# Patient Record
Sex: MALE | Race: WHITE | Employment: OTHER | RURAL
[De-identification: names, ages, dates, MRNs, and addresses within clinical notes are randomized per-mention and may not be internally consistent; named-entity substitution may affect disease eponyms.]

---

## 2019-04-17 PROBLEM — K21.9 GERD (GASTROESOPHAGEAL REFLUX DISEASE): Status: ACTIVE | Noted: 2019-04-17

## 2019-04-17 PROBLEM — I10 HTN (HYPERTENSION): Status: ACTIVE | Noted: 2019-04-17

## 2019-04-17 PROBLEM — M19.90 OA (OSTEOARTHRITIS): Status: ACTIVE | Noted: 2019-04-17

## 2019-04-17 PROBLEM — C61 PROSTATE CA (HCC): Status: ACTIVE | Noted: 2019-04-17

## 2019-04-17 PROBLEM — K92.2 GI BLEED: Status: ACTIVE | Noted: 2019-04-17

## 2019-05-09 ENCOUNTER — TELEPHONE (OUTPATIENT)
Dept: CARDIOLOGY CLINIC | Age: 84
End: 2019-05-09

## 2019-05-09 NOTE — TELEPHONE ENCOUNTER
Spoke with the pt. Verified patient with two patient identifiers. Pt made an appt with Dr. Jaja Womack for June but he will run out of the metoprolol by then. Pt states that Dr. Jaja Womack consulted on him at the hospital and recommended the metoprolol. Pt would like Dr. Jaja Womack advice on the metoprolol.

## 2019-05-10 RX ORDER — METOPROLOL SUCCINATE 25 MG/1
25 TABLET, EXTENDED RELEASE ORAL DAILY
Qty: 30 TAB | Refills: 1 | Status: SHIPPED | OUTPATIENT
Start: 2019-05-10 | End: 2019-06-13 | Stop reason: SDUPTHER

## 2019-05-10 NOTE — TELEPHONE ENCOUNTER
Per Dr. Katy Eric:  Spoke to him briefly in hospital and talkedd to Dr. Daryle [place] (not formal consult)--agree with the Metoprolol XL 25mg --can send in rx w/ refills. [facility]     Verbal order per Dr. Katy Eric. Order (medication, dose, route, frequency, amount, refills) repeated and verified twice. L/m for the pt regarding.

## 2019-05-28 ENCOUNTER — TELEPHONE (OUTPATIENT)
Dept: CARDIOLOGY CLINIC | Age: 84
End: 2019-05-28

## 2019-05-28 NOTE — TELEPHONE ENCOUNTER
Was in [facility] a few weeks ago and was put on Metoprolo and is on his last few pills. Does he need to continue taking this? Please call [phone].     Thanks,    Rodrigo Kearns

## 2019-05-28 NOTE — TELEPHONE ENCOUNTER
Per Dr. Chidi Deluca:  Spoke to him briefly in hospital and talkedd to Dr. Isaac Fierro (not formal consult)--agree with the Metoprolol XL 25mg --can send in rx w/ refills. [facility]     Spoke with the patient. Verified patient with two patient identifiers. Dr. Chidi Deluca order given and questions answered. Patient verbalized understanding.

## 2019-06-13 ENCOUNTER — OFFICE VISIT (OUTPATIENT)
Dept: CARDIOLOGY CLINIC | Age: 84
End: 2019-06-13

## 2019-06-13 VITALS
OXYGEN SATURATION: 97 % | DIASTOLIC BLOOD PRESSURE: 80 MMHG | HEART RATE: 68 BPM | HEIGHT: 66 IN | SYSTOLIC BLOOD PRESSURE: 114 MMHG | WEIGHT: 174 LBS | BODY MASS INDEX: 27.97 KG/M2 | RESPIRATION RATE: 14 BRPM

## 2019-06-13 DIAGNOSIS — K21.9 GASTROESOPHAGEAL REFLUX DISEASE WITHOUT ESOPHAGITIS: ICD-10-CM

## 2019-06-13 DIAGNOSIS — M15.9 PRIMARY OSTEOARTHRITIS INVOLVING MULTIPLE JOINTS: ICD-10-CM

## 2019-06-13 DIAGNOSIS — K92.2 GASTROINTESTINAL HEMORRHAGE, UNSPECIFIED GASTROINTESTINAL HEMORRHAGE TYPE: ICD-10-CM

## 2019-06-13 DIAGNOSIS — C61 PROSTATE CA (HCC): ICD-10-CM

## 2019-06-13 DIAGNOSIS — I10 ESSENTIAL HYPERTENSION: ICD-10-CM

## 2019-06-13 DIAGNOSIS — I49.9 IRREGULAR HEART BEAT: ICD-10-CM

## 2019-06-13 DIAGNOSIS — I47.1 PAT (PAROXYSMAL ATRIAL TACHYCARDIA) (HCC): Primary | ICD-10-CM

## 2019-06-13 RX ORDER — DOXAZOSIN 4 MG/1
4 TABLET ORAL
Refills: 3 | COMMUNITY
Start: 2019-05-13 | End: 2019-07-16

## 2019-06-13 RX ORDER — METOPROLOL SUCCINATE 25 MG/1
25 TABLET, EXTENDED RELEASE ORAL DAILY
Qty: 90 TAB | Refills: 1 | Status: SHIPPED | OUTPATIENT
Start: 2019-06-13 | End: 2019-12-18 | Stop reason: SINTOL

## 2019-06-13 NOTE — PROGRESS NOTES
Marleni Leonard is a 80 y.o. male is here for new patient evaluation, PAT. Hx hypertension, DJD, GERD admitted to [facility] 4/17-4/19/19 with acute GI bleed. Had been on NSAIDS for DJD, hx GERD. No clear source identified--likely diverticular (noted on CT). Stabilized, transfused. No recurrence since. Has colonoscopy scheduled. No CV complaints but had brief episode of PAT while in hospital--really no sx associated. Started low dose beta blocker (no anticoag obviously), here for f/u. Echo 2/18 with mild LVH, LVEF 41-26, grade I diastolic. Hx hypertension but no prior cardiac hx. Currently feels well. The patient denies chest pain/ shortness of breath, orthopnea, PND, LE edema, palpitations, syncope, presyncope or fatigue. Patient Active Problem List    Diagnosis Date Noted    [facility]ox[facility]) ([facility]     Hypertension     GI bleed 04/17/2019    OA (osteoarthritis) 04/17/2019    GERD (gastroesophageal reflux disease) 04/17/2019    HTN (hypertension) 04/17/2019    Prostate CA ([facility] 04/17/2019      Orin Beth MD  Past Medical History:   Diagnosis Date    Arthritis     GERD (gastroesophageal reflux disease)     GI bleed     History of prostate surgery     Hypertension     PAT (paroxysmal atrial tachycardia) (HCC)     Prostate enlargement       Past Surgical History:   Procedure Laterality Date    HX ORTHOPAEDIC      multiple shoulder procedures    HX PROSTATECTOMY      HX UROLOGICAL       No Known Allergies   No family history on file.    Social History     Socioeconomic History    Marital status:      Spouse name: Not on file    Number of children: Not on file    Years of education: Not on file    Highest education level: Not on file   Occupational History    Not on file   Social Needs    Financial resource strain: Not on file    Food insecurity:     Worry: Not on file     Inability: Not on file    Transportation needs:     Medical: Not on file Non-medical: Not on file   Tobacco Use    Smoking status: Former Smoker    Smokeless tobacco: Never Used    Tobacco comment: quit 30 years ago   Substance and Sexual Activity    Alcohol use: Yes     Alcohol/week: 8.4 oz     Types: 14 Shots of liquor per week     Comment: Per week     Drug use: No    Sexual activity: Not on file   Lifestyle    Physical activity:     Days per week: Not on file     Minutes per session: Not on file    Stress: Not on file   Relationships    Social connections:     Talks on phone: Not on file     Gets together: Not on file     Attends Rastafarian service: Not on file     Active member of club or organization: Not on file     Attends meetings of clubs or organizations: Not on file     Relationship status: Not on file    Intimate partner violence:     Fear of current or ex partner: Not on file     Emotionally abused: Not on file     Physically abused: Not on file     Forced sexual activity: Not on file   Other Topics Concern     Service Not Asked    Blood Transfusions Not Asked    Caffeine Concern Not Asked    Occupational Exposure Not Asked   Candance Reyes Hazards Not Asked    Sleep Concern Not Asked    Stress Concern Not Asked    Weight Concern Not Asked    Special Diet Not Asked    Back Care Not Asked    Exercise Not Asked    Bike Helmet Not Asked    Seat Belt Not Asked    Self-Exams Not Asked   Social History Narrative    Not on file      Current Outpatient Medications   Medication Sig    metoprolol succinate (TOPROL-XL) 25 mg XL tablet Take 1 Tab by mouth daily.  tamsulosin (FLOMAX) 0.4 mg capsule Take 0.8 mg by mouth nightly.  vit C/E/Zn/coppr/lutein/zeaxan (PRESERVISION AREDS-2 PO) Take 1 Tab by mouth daily.  lovastatin (MEVACOR) 20 mg tablet Take 20 mg by mouth nightly.  pantoprazole (PROTONIX) 40 mg tablet Take 40 mg by mouth daily. No current facility-administered medications for this visit.           Review of Symptoms:    CONST  No weight change. No fever, chills, sweats    ENT No visual changes, URI sx, sore throat    CV  See HPI   RESP  No cough, or sputum, wheezing. Also see HPI   GI  No abdominal pain or change in bowel habits. No heartburn or dysphagia. No melena or rectal bleeding.   No dysuria, urgency, frequency, hematuria   MSKEL  No joint pain, swelling. No muscle pain. SKIN  No rash or lesions. NEURO  No headache, syncope, or seizure. No weakness, loss of sensation, or paresthesias. PSYCH  No low mood or depression  No anxiety. HE/LYMPH  No easy bruising, abnormal bleeding, or enlarged glands.         Physical ExamPhysical Exam:    Visit Vitals  Resp 14   Ht 5' 6\" (1.676 m)   Wt 174 lb (78.9 kg)   BMI 28.08 kg/m²     Gen: NAD  HEENT:  PERRL, throat clear  Neck: no adenopathy, no thyromegaly, no JVD   Heart:  Regular,Nl S1S2,  no murmur, gallop or rub.   Lungs:  clear  Abdomen:   Soft, non-tender, bowel sounds are active.   Extremities:  No edema  Pulse: symmetric  Neuro: A&O times 3, No focal neuro deficits    Cardiographics    ECG: NSR, first degree AV block, PAC's    Labs:   Lab Results   Component Value Date/Time    Sodium 143 04/19/2019 05:50 AM    Sodium 142 04/18/2019 06:07 AM    Sodium 142 04/17/2019 11:24 AM    Sodium 143 10/25/2017 01:06 PM    Potassium 4.0 04/19/2019 05:50 AM    Potassium 4.3 04/18/2019 06:07 AM    Potassium 3.8 04/17/2019 11:24 AM    Potassium 3.9 10/25/2017 01:06 PM    Chloride 108 04/19/2019 05:50 AM    Chloride 107 04/18/2019 06:07 AM    Chloride 105 04/17/2019 11:24 AM    Chloride 107 10/25/2017 01:06 PM    CO2 26 04/19/2019 05:50 AM    CO2 27 04/18/2019 06:07 AM    CO2 26 04/17/2019 11:24 AM    CO2 26 10/25/2017 01:06 PM    Anion gap 9 04/19/2019 05:50 AM    Anion gap 8 04/18/2019 06:07 AM    Anion gap 11 04/17/2019 11:24 AM    Anion gap 10 10/25/2017 01:06 PM    Glucose 126 (H) 04/19/2019 05:50 AM    Glucose 127 (H) 04/18/2019 06:07 AM    Glucose 126 (H) 04/17/2019 11:24 AM Glucose 108 (H) 10/25/2017 01:06 PM    BUN 10 04/19/2019 05:50 AM    BUN 13 04/18/2019 06:07 AM    BUN 19 04/17/2019 11:24 AM    BUN 15 10/25/2017 01:06 PM    Creatinine 0.78 04/19/2019 05:50 AM    Creatinine 0.76 04/18/2019 06:07 AM    Creatinine 0.90 04/17/2019 11:24 AM    Creatinine 0.92 10/25/2017 01:06 PM    BUN/Creatinine ratio 13 04/19/2019 05:50 AM    BUN/Creatinine ratio 17 04/18/2019 06:07 AM    BUN/Creatinine ratio 21 (H) 04/17/2019 11:24 AM    BUN/Creatinine ratio 16 10/25/2017 01:06 PM    GFR est AA >60 04/19/2019 05:50 AM    GFR est AA >60 04/18/2019 06:07 AM    GFR est AA >60 04/17/2019 11:24 AM    GFR est AA >60 10/25/2017 01:06 PM    GFR est non-AA >60 04/19/2019 05:50 AM    GFR est non-AA >60 04/18/2019 06:07 AM    GFR est non-AA >60 04/17/2019 11:24 AM    GFR est non-AA >60 10/25/2017 01:06 PM    Calcium 8.1 (L) 04/19/2019 05:50 AM    Calcium 7.7 (L) 04/18/2019 06:07 AM    Calcium 8.2 (L) 04/17/2019 11:24 AM    Calcium 8.9 10/25/2017 01:06 PM    Bilirubin, total 0.6 04/17/2019 11:24 AM    AST (SGOT) 19 04/17/2019 11:24 AM    Alk. phosphatase 67 04/17/2019 11:24 AM    Protein, total 7.1 04/17/2019 11:24 AM    Albumin 3.4 (L) 04/17/2019 11:24 AM    Globulin 3.7 04/17/2019 11:24 AM    A-G Ratio 0.9 (L) 04/17/2019 11:24 AM    ALT (SGPT) 24 04/17/2019 11:24 AM     No results found for: CPK, CPKX, CPX  No results found for: CHOL, CHOLX, CHLST, CHOLV, 225866, HDL, LDL, LDLC, DLDLP, TGLX, TRIGL, TRIGP, CHHD, CHHDX  No results found for this or any previous visit. Assessment:         Patient Active Problem List    Diagnosis Date Noted    [facility]ox[facility]mal [facility]) ([facility]     Hypertension     GI bleed 04/17/2019    OA (osteoarthritis) 04/17/2019    GERD (gastroesophageal reflux disease) 04/17/2019    HTN (hypertension) 04/17/2019    Prostate CA ([facility] 04/17/2019     Here for new patient evaluation, PAT. Hx hypertension, DJD, GERD admitted to [facility] 4/17-4/19/19 with acute GI bleed.   Had been on NSAIDS for DJD, hx GERD. No clear source identified--likely diverticular (noted on CT). Stabilized, transfused. No recurrence since. Has colonoscopy scheduled. No CV complaints but had brief episode of PAT while in hospital--really no sx associated. Started low dose beta blocker (no anticoag obviously), here for f/u. Echo 2/18 with mild LVH, LVEF 50-96, grade I diastolic. Hx hypertension but no prior cardiac hx. Currently feels well.       Plan:     Continue low dose Metoprolol XL 25  Echo/doppler (last was 2/18)--PAT (vs PAF), hypertension  Holter monitor--PAC's, PAT, r/o PAF  No anticoag  CLEAR for planned colonoscopy  Call w/ results  F/u 6 mos, sooner prn  Tasha Triana MD

## 2019-06-13 NOTE — PROGRESS NOTES
Verified patient with two patient identifiers. Medications reviewed/approved by Dr. Yaritza Tracey. A verbal from Dr. Yaritza Tracey was given to remove any medications that were deleted during the visit. Medication(s) removed:  tamsulosin (FLOMAX) 0.4 mg capsule           Chief Complaint   Patient presents with    Rapid Heart Rate     (PAT vs  SVT  vs FLUTTER) Post [facility] discharge (Dr. Yaritza Tracey consulted with hospitalist during hospitalization)     1. Have you been to the ER, urgent care clinic since your last visit? Hospitalized since your last visit? new pt.  2. Have you seen or consulted any other health care providers outside of the [address] [place] [address] since your last visit? Include any pap smears or colon screening. new pt.

## 2019-06-25 ENCOUNTER — OFFICE VISIT (OUTPATIENT)
Dept: SURGERY | Age: 84
End: 2019-06-25

## 2019-06-25 VITALS
SYSTOLIC BLOOD PRESSURE: 149 MMHG | WEIGHT: 177.8 LBS | HEIGHT: 66 IN | DIASTOLIC BLOOD PRESSURE: 73 MMHG | BODY MASS INDEX: 28.57 KG/M2 | HEART RATE: 67 BPM

## 2019-06-25 DIAGNOSIS — K21.9 GASTROESOPHAGEAL REFLUX DISEASE WITHOUT ESOPHAGITIS: ICD-10-CM

## 2019-06-25 DIAGNOSIS — K92.2 GASTROINTESTINAL HEMORRHAGE, UNSPECIFIED GASTROINTESTINAL HEMORRHAGE TYPE: Primary | ICD-10-CM

## 2019-06-25 RX ORDER — SODIUM CHLORIDE, SODIUM LACTATE, POTASSIUM CHLORIDE, CALCIUM CHLORIDE 600; 310; 30; 20 MG/100ML; MG/100ML; MG/100ML; MG/100ML
200 INJECTION, SOLUTION INTRAVENOUS CONTINUOUS
Status: CANCELLED | OUTPATIENT
Start: 2019-06-25 | End: 2019-06-26

## 2019-06-26 NOTE — PROGRESS NOTES
[address] [facility], [address]  Phone: [phone] Fax: [phone]                                           HISTORY AND PHYSICAL EXAM    NAME: Kait Santos  : 1932    Chief Complaint:   Recent GI bleed  GERD    History: Kait Santos is a 80 y.o. WHITE OR  male referred for colonoscopy and EGD. This is  the patient's second colonoscopy. The previous one was a long time ago. The bowel movements are regular and brown now. He does not use any meds on a regular basis for his bowels. He denies any recent blood in stools since his hospital stay for lower GI bleed. Family history is negative for colon cancer or colon polyps. He has had a history of ulcers in the distant past and now has some reflux symptoms. He takes omeprazole daily. Past Medical History:   Diagnosis Date    Arthritis     Dyslipidemia     GERD (gastroesophageal reflux disease)     GI bleed     History of prostate surgery     Hypertension     PAT (paroxysmal atrial tachycardia) (HCC)     Prostate enlargement      Past Surgical History:   Procedure Laterality Date    HX COLONOSCOPY      HX ORTHOPAEDIC      multiple shoulder procedures    HX PROSTATECTOMY      HX UROLOGICAL          Current Outpatient Medications   Medication Sig Dispense Refill    doxazosin (CARDURA) 4 mg tablet Take 4 mg by mouth nightly. 3    metoprolol succinate (TOPROL-XL) 25 mg XL tablet Take 1 Tab by mouth daily. 90 Tab 1    vit C/E/Zn/coppr/lutein/zeaxan (PRESERVISION AREDS-2 PO) Take 1 Tab by mouth daily.  lovastatin (MEVACOR) 20 mg tablet Take 20 mg by mouth nightly.  pantoprazole (PROTONIX) 40 mg tablet Take 40 mg by mouth daily.        No Known Allergies  Social History     Socioeconomic History    Marital status:      Spouse name: Not on file    Number of children: Not on file    Years of education: Not on file    Highest education level: Not on file   Occupational History    Not on file   Social Needs    Financial resource strain: Not on file    Food insecurity:     Worry: Not on file     Inability: Not on file    Transportation needs:     Medical: Not on file     Non-medical: Not on file   Tobacco Use    Smoking status: Former Smoker    Smokeless tobacco: Never Used    Tobacco comment: quit 30 years ago   Substance and Sexual Activity    Alcohol use:  Yes     Alcohol/week: 8.4 oz     Types: 14 Shots of liquor per week     Comment: Per week     Drug use: No    Sexual activity: Not on file   Lifestyle    Physical activity:     Days per week: Not on file     Minutes per session: Not on file    Stress: Not on file   Relationships    Social connections:     Talks on phone: Not on file     Gets together: Not on file     Attends Orthodoxy service: Not on file     Active member of club or organization: Not on file     Attends meetings of clubs or organizations: Not on file     Relationship status: Not on file    Intimate partner violence:     Fear of current or ex partner: Not on file     Emotionally abused: Not on file     Physically abused: Not on file     Forced sexual activity: Not on file   Other Topics Concern     Service Not Asked    Blood Transfusions Not Asked    Caffeine Concern Not Asked    Occupational Exposure Not Asked   Mike Alonzo Hazards Not Asked    Sleep Concern Not Asked    Stress Concern Not Asked    Weight Concern Not Asked    Special Diet Not Asked    Back Care Not Asked    Exercise Not Asked    Bike Helmet Not Asked   [address] Not Asked    Self-Exams Not Asked   Social History Narrative    Not on file     Family History   Problem Relation Age of Onset    No Known Problems Mother        Patient Active Problem List   Diagnosis Code    GI bleed K92.2    OA (osteoarthritis) M19.90    GERD (gastroesophageal reflux disease) K21.9    HTN (hypertension) I10    Prostate CA ([facility] C61    [facility] I47.1    Hypertension I10    Dyslipidemia E78.5       Review of Systems:    Review of Systems   Constitutional: Negative for chills, fever, malaise/fatigue and weight loss. HENT: Negative for congestion, ear discharge, ear pain, hearing loss, nosebleeds, sore throat and tinnitus. Eyes: Negative for blurred vision, double vision, pain, discharge and redness. Respiratory: Negative for cough, sputum production, shortness of breath and wheezing. Cardiovascular: Positive for leg swelling. Negative for chest pain and palpitations. Gastrointestinal: Positive for heartburn. Negative for abdominal pain, blood in stool, constipation, diarrhea, melena, nausea and vomiting. Genitourinary: Positive for frequency. Negative for hematuria and urgency. Musculoskeletal: Positive for joint pain. Negative for back pain and neck pain. Skin: Negative for itching and rash. Neurological: Negative for dizziness, tremors, focal weakness, seizures, weakness and headaches. Endo/Heme/Allergies: Does not bruise/bleed easily. Psychiatric/Behavioral: Negative for depression and memory loss. The patient is not nervous/anxious and does not have insomnia. Physical Exam:  Visit Vitals  /73 (BP 1 Location: Left arm, BP Patient Position: Sitting)   Pulse 67   Ht 5' 6\" (1.676 m)   Wt 177 lb 12.8 oz (80.6 kg)   BMI 28.70 kg/m²       Physical Exam   Constitutional: He is oriented to person, place, and time. He appears well-developed and well-nourished. No distress. HENT:   Head: Normocephalic and atraumatic. Right Ear: External ear normal.   Left Ear: External ear normal.   Nose: Nose normal.   Mouth/Throat: Oropharynx is clear and moist. No oropharyngeal exudate. Eyes: Pupils are equal, round, and reactive to light. EOM are normal. Right eye exhibits no discharge. Left eye exhibits no discharge. No scleral icterus. Neck: Neck supple. No tracheal deviation present. No thyromegaly present.    Cardiovascular: Normal rate, regular rhythm and normal heart sounds. Exam reveals no friction rub. No murmur heard. Pulmonary/Chest: Effort normal and breath sounds normal. No respiratory distress. He has no wheezes. He has no rales. Abdominal: Soft. He exhibits no distension and no mass. There is no tenderness. Musculoskeletal: Normal range of motion. Lymphadenopathy:     He has no cervical adenopathy. Neurological: He is alert and oriented to person, place, and time. Skin: Skin is warm and dry. No rash noted. No erythema. Psychiatric: He has a normal mood and affect. His behavior is normal. Judgment and thought content normal.         Impression:  Recent GI bleed  GERD    Plan:  EGD/colonoscopy on 2/01/88  Risks and complications were explained to patient, including perforation and bleeding, and they voiced understanding.     Luann Luke M.D.

## 2019-07-03 ENCOUNTER — CLINICAL SUPPORT (OUTPATIENT)
Dept: CARDIOLOGY CLINIC | Age: 84
End: 2019-07-03

## 2019-07-03 ENCOUNTER — TELEPHONE (OUTPATIENT)
Dept: CARDIOLOGY CLINIC | Age: 84
End: 2019-07-03

## 2019-07-03 DIAGNOSIS — I47.1 PAT (PAROXYSMAL ATRIAL TACHYCARDIA) (HCC): ICD-10-CM

## 2019-07-03 DIAGNOSIS — I49.9 IRREGULAR HEART BEAT: ICD-10-CM

## 2019-07-03 NOTE — TELEPHONE ENCOUNTER
----- Message from Mario Herrera MD sent at 7/3/2019 11:41 AM EDT -----  Regarding: echo  Advise echo looks ok--normal LV pumping function, only mild valve abnormalities, no real concerns.    Thanks [facility]

## 2019-07-03 NOTE — PROGRESS NOTES
24 hour Holter monitor only. Verified patient with two patient identifiers. Pt verbalized understanding of its use. Ordering FANTAS[facility] Driscoll  Reason: [facility]ox[facility]mal [facility] [I47.1 (ICD-10-CM)]; Irregular heart beat [I49.9 (ICD-10-CM)]  Start time: 12:14pm  Return date: 7/8/19 due to holiday    *echocardiogram results were given during Holter application. Pt verbalized understanding and report was given to him. No LOS.

## 2019-07-08 ENCOUNTER — DOCUMENTATION ONLY (OUTPATIENT)
Dept: CARDIOLOGY CLINIC | Age: 84
End: 2019-07-08

## 2019-07-10 ENCOUNTER — TELEPHONE (OUTPATIENT)
Dept: CARDIOLOGY CLINIC | Age: 84
End: 2019-07-10

## 2019-07-10 NOTE — TELEPHONE ENCOUNTER
----- Message from Rosaura Cazares MD sent at 7/10/2019 11:22 AM EDT -----  Regarding: holter  Advise episodes of afib and significant heart block (up to 4.4 sec pauses)--needs to see Dr. Jj Pena for consideration of pacemaker. HOLD METOPROLOL until further instructed by Dr. Jj Pena. Thanks Vanessa Ramirez with the patient. Verified patient with two patient identifiers. Results given and questions answered. Awaiting appt for Dr. Jj Pena. I will call the pt w/ appt. Address/P# given to the pt. Patient verbalized understanding.

## 2019-07-11 ENCOUNTER — DOCUMENTATION ONLY (OUTPATIENT)
Dept: SURGERY | Age: 84
End: 2019-07-11

## 2019-07-11 NOTE — TELEPHONE ENCOUNTER
Called Dr. Pauline Billings office and spoke with Paige Corcoran. Pts colonoscopy & EGD has been cancelled due to significant heart block and the need of EP evaluation for the consideration of a pacemaker.

## 2019-07-11 NOTE — PROGRESS NOTES
SPOKE WITH MIRA AT DR. PAULINO LETTING US KNOW WE NEED TO CANCEL COLONOSCOPY BECAUSE HE NEEDS TO HAVE A PACEMAKER. ONCE THIS IS DONE WE CAN RESCHEDULE.

## 2019-07-16 ENCOUNTER — OFFICE VISIT (OUTPATIENT)
Dept: CARDIOLOGY CLINIC | Age: 84
End: 2019-07-16

## 2019-07-16 VITALS
HEART RATE: 70 BPM | BODY MASS INDEX: 28.62 KG/M2 | RESPIRATION RATE: 16 BRPM | DIASTOLIC BLOOD PRESSURE: 80 MMHG | SYSTOLIC BLOOD PRESSURE: 130 MMHG | HEIGHT: 66 IN | WEIGHT: 178.1 LBS

## 2019-07-16 DIAGNOSIS — I10 ESSENTIAL HYPERTENSION: ICD-10-CM

## 2019-07-16 DIAGNOSIS — I44.2 CHB (COMPLETE HEART BLOCK) (HCC): ICD-10-CM

## 2019-07-16 DIAGNOSIS — E78.5 DYSLIPIDEMIA: ICD-10-CM

## 2019-07-16 DIAGNOSIS — I47.1 PAT (PAROXYSMAL ATRIAL TACHYCARDIA) (HCC): Primary | ICD-10-CM

## 2019-07-16 RX ORDER — TAMSULOSIN HYDROCHLORIDE 0.4 MG/1
0.4 CAPSULE ORAL DAILY
COMMUNITY
Start: 2019-05-04 | End: 2022-01-01

## 2019-07-16 NOTE — H&P (VIEW-ONLY)
Subjective:      Eber Hodge is a 80 y.o. male is here for EP consult. Pt with abnl monitor - dizziness associated with transient chb and 4.4 sec pause. Was on a bb for PAT. Patient Active Problem List    Diagnosis Date Noted    PAT [facility]paroxysmal atrial tachycardia) [address] [address]     Hypertension     Dyslipidemia     GI bleed 04/17/2019    OA (osteoarthritis) 04/17/2019    GERD (gastroesophageal reflux disease) 04/17/2019    HTN (hypertension) 04/17/2019    Prostate CA ([address] 04/17/2019      Orin Beth MD  Past Medical History:   Diagnosis Date    Arthritis     Dyslipidemia     GERD (gastroesophageal reflux disease)     GI bleed     History of prostate surgery     Hypertension     PAT (paroxysmal atrial tachycardia) (HCC)     Prostate enlargement       Past Surgical History:   Procedure Laterality Date    HX COLONOSCOPY  2004    HX ORTHOPAEDIC      multiple shoulder procedures    HX PROSTATECTOMY      HX UROLOGICAL       No Known Allergies   Family History   Problem Relation Age of Onset    No Known Problems Mother     negative for cardiac disease  Social History     Socioeconomic History    Marital status:      Spouse name: Not on file    Number of children: Not on file    Years of education: Not on file    Highest education level: Not on file   Tobacco Use    Smoking status: Former Smoker    Smokeless tobacco: Never Used    Tobacco comment: quit 30 years ago   Substance and Sexual Activity    Alcohol use: Yes     Alcohol/week: 8.4 oz     Types: 14 Shots of liquor per week     Comment: Per week     Drug use: No   Other Topics Concern     Current Outpatient Medications   Medication Sig    tamsulosin (FLOMAX) 0.4 mg capsule Take 0.4 mg by mouth daily.  metoprolol succinate (TOPROL-XL) 25 mg XL tablet Take 1 Tab by mouth daily.  vit C/E/Zn/coppr/lutein/zeaxan (PRESERVISION AREDS-2 PO) Take 1 Tab by mouth daily.     lovastatin (MEVACOR) 20 mg tablet Take 20 mg by mouth nightly.  pantoprazole (PROTONIX) 40 mg tablet Take 40 mg by mouth daily. No current facility-administered medications for this visit. Vitals:    07/16/19 1040   BP: 130/80   Pulse: 70   Resp: 16   Weight: 178 lb 1.6 oz (80.8 kg)   Height: 5' 6\" (1.676 m)       I have reviewed the nurses notes, vitals, problem list, allergy list, medical history, family, social history and medications. Review of Symptoms:    General: Pt denies excessive weight gain or loss. Pt is able to conduct ADL's  HEENT: Denies blurred vision, headaches, epistaxis and difficulty swallowing. Respiratory: Denies shortness of breath, RANGEL, wheezing or stridor. Cardiovascular: +dizziness, Denies precordial pain, palpitations, edema or PND  Gastrointestinal: Denies poor appetite, indigestion, abdominal pain or blood in stool  Urinary: Denies dysuria, pyuria  Musculoskeletal: Denies pain or swelling from muscles or joints  Neurologic: Denies tremor, paresthesias, or sensory motor disturbance  Skin: Denies rash, itching or texture change. Psych: Denies depression      Physical Exam:      General: Well developed, in no acute distress. HEENT: Eyes - PERRL, no jvd  Heart:  Normal S1/S2 negative S3 or S4. Regular, no murmur, gallop or rub.   Respiratory: Clear bilaterally x 4, no wheezing or rales  Abdomen:   Soft, non-tender, bowel sounds are active.   Extremities:  No edema, normal cap refill, no cyanosis. Musculoskeletal: No clubbing  Neuro: A&Ox3, speech clear, gait stable. Skin: Skin color is normal. No rashes or lesions.  Non diaphoretic  Vascular: 2+ pulses symmetric in all extremities    Cardiographics    Ekg: nsr with first degree av block    Monitor - transient chb - 4.4 sec pause    Results for orders placed or performed during the hospital encounter of 04/17/19   EKG, 12 LEAD, INITIAL   Result Value Ref Range    Ventricular Rate 75 BPM    Atrial Rate 75 BPM    P-R Interval 238 ms    QRS Duration 102 ms    Q-T Interval 394 ms    QTC Calculation (Bezet) 439 ms    Calculated P Axis 57 degrees    Calculated R Axis -28 degrees    Calculated T Axis -13 degrees    Diagnosis       Sinus rhythm with 1st degree AV block  Moderate voltage criteria for LVH, may be normal variant  Borderline ECG  When compared with ECG of 25-OCT-2017 12:40,  No significant change was found  Confirmed by Elmer Hunt MD, --- ([id number]) on 4/18/2019 7:57:30 PM           Lab Results   Component Value Date/Time    WBC 6.9 04/19/2019 05:50 AM    HGB 10.6 (L) 04/19/2019 05:50 AM    HCT 32.7 (L) 04/19/2019 05:50 AM    PLATELET 427 52/29/9761 05:50 AM    MCV 99.1 (H) 04/19/2019 05:50 AM      Lab Results   Component Value Date/Time    Sodium 143 04/19/2019 05:50 AM    Potassium 4.0 04/19/2019 05:50 AM    Chloride 108 04/19/2019 05:50 AM    CO2 26 04/19/2019 05:50 AM    Anion gap 9 04/19/2019 05:50 AM    Glucose 126 (H) 04/19/2019 05:50 AM    BUN 10 04/19/2019 05:50 AM    Creatinine 0.78 04/19/2019 05:50 AM    BUN/Creatinine ratio 13 04/19/2019 05:50 AM    GFR est AA >60 04/19/2019 05:50 AM    GFR est non-AA >60 04/19/2019 05:50 AM    Calcium 8.1 (L) 04/19/2019 05:50 AM    Bilirubin, total 0.6 04/17/2019 11:24 AM    AST (SGOT) 19 04/17/2019 11:24 AM    Alk. phosphatase 67 04/17/2019 11:24 AM    Protein, total 7.1 04/17/2019 11:24 AM    Albumin 3.4 (L) 04/17/2019 11:24 AM    Globulin 3.7 04/17/2019 11:24 AM    A-G Ratio 0.9 (L) 04/17/2019 11:24 AM    ALT (SGPT) 24 04/17/2019 11:24 AM         Assessment:     Assessment:        ICD-10-CM ICD-9-CM    1. PAT (paroxysmal atrial tachycardia) (HCC) I47.1 427.0 AMB POC EKG ROUTINE W/ 12 LEADS, INTER & REP   2. Essential hypertension I10 401.9 CBC WITH AUTOMATED DIFF      PROTHROMBIN TIME + INR      METABOLIC PANEL, COMPREHENSIVE      XR CHEST PA LAT   3. CHB (complete heart block) (HCC) I44.2 426.0 CBC WITH AUTOMATED DIFF      PROTHROMBIN TIME + INR      METABOLIC PANEL, COMPREHENSIVE      XR CHEST PA LAT   4. Dyslipidemia E78.5 272.4 CBC WITH AUTOMATED DIFF      PROTHROMBIN TIME + INR      METABOLIC PANEL, COMPREHENSIVE      XR CHEST PA LAT     Orders Placed This Encounter    XR CHEST PA LAT     Standing Status:   Future     Standing Expiration Date:   8/16/2020     Order Specific Question:   Reason for Exam     Answer:   pre op     Order Specific Question:   Is Patient Allergic to Contrast Dye? Answer:   Unknown    CBC WITH AUTOMATED DIFF    PROTHROMBIN TIME + INR    METABOLIC PANEL, COMPREHENSIVE    AMB POC EKG ROUTINE W/ 12 LEADS, INTER & REP     Order Specific Question:   Reason for Exam:     Answer:   routine    tamsulosin (FLOMAX) 0.4 mg capsule     Sig: Take 0.4 mg by mouth daily. Plan:   Mr Jairo Flowers is a pleasant gentleman with PAT who had symptomatic transient CHB on a monitor. He will bb therapy for his PAT. He is a candidate for a pacemaker. I discussed the risks/benefits/alternatives of the procedure with the patient. Risks include (but are not limited to) bleeding, heart block, infection, cva/mi/tamponade/death. The patient understands and agrees to proceed. Thank you for this interesting consultation. Continue medical management for PAT, chb and htn. Thank you for allowing me to participate in Raymond Lawton 's care.     New Montiel MD, Megan Mulligan

## 2019-07-16 NOTE — PROGRESS NOTES
Chief Complaint   Patient presents with    New Patient     PAT (paroxysmal atrial tachycardia)    He had seen Dr. Abigail Pagan in the past with his spouse.

## 2019-07-16 NOTE — PROGRESS NOTES
Subjective:      Alexis Monae is a 80 y.o. male is here for EP consult. Pt with abnl monitor - dizziness associated with transient chb and 4.4 sec pause. Was on a bb for PAT. Patient Active Problem List    Diagnosis Date Noted    PAT [facility]parox[facility]mal atrial tachycardia) ([facility] [address]     Hypertension     Dyslipidemia     GI bleed 04/17/2019    OA (osteoarthritis) 04/17/2019    GERD (gastroesophageal reflux disease) 04/17/2019    HTN (hypertension) 04/17/2019    Prostate CA ([address] 04/17/2019      Orin Beth MD  Past Medical History:   Diagnosis Date    Arthritis     Dyslipidemia     GERD (gastroesophageal reflux disease)     GI bleed     History of prostate surgery     Hypertension     PAT (paroxysmal atrial tachycardia) (HCC)     Prostate enlargement       Past Surgical History:   Procedure Laterality Date    HX COLONOSCOPY  2004    HX ORTHOPAEDIC      multiple shoulder procedures    HX PROSTATECTOMY      HX UROLOGICAL       No Known Allergies   Family History   Problem Relation Age of Onset    No Known Problems Mother     negative for cardiac disease  Social History     Socioeconomic History    Marital status:      Spouse name: Not on file    Number of children: Not on file    Years of education: Not on file    Highest education level: Not on file   Tobacco Use    Smoking status: Former Smoker    Smokeless tobacco: Never Used    Tobacco comment: quit 30 years ago   Substance and Sexual Activity    Alcohol use: Yes     Alcohol/week: 8.4 oz     Types: 14 Shots of liquor per week     Comment: Per week     Drug use: No   Other Topics Concern     Current Outpatient Medications   Medication Sig    tamsulosin (FLOMAX) 0.4 mg capsule Take 0.4 mg by mouth daily.  metoprolol succinate (TOPROL-XL) 25 mg XL tablet Take 1 Tab by mouth daily.  vit C/E/Zn/coppr/lutein/zeaxan (PRESERVISION AREDS-2 PO) Take 1 Tab by mouth daily.     lovastatin (MEVACOR) 20 mg tablet Take 20 mg by mouth nightly.  pantoprazole (PROTONIX) 40 mg tablet Take 40 mg by mouth daily. No current facility-administered medications for this visit. Vitals:    07/16/19 1040   BP: 130/80   Pulse: 70   Resp: 16   Weight: 178 lb 1.6 oz (80.8 kg)   Height: 5' 6\" (1.676 m)       I have reviewed the nurses notes, vitals, problem list, allergy list, medical history, family, social history and medications. Review of Symptoms:    General: Pt denies excessive weight gain or loss. Pt is able to conduct ADL's  HEENT: Denies blurred vision, headaches, epistaxis and difficulty swallowing. Respiratory: Denies shortness of breath, RANGEL, wheezing or stridor. Cardiovascular: +dizziness, Denies precordial pain, palpitations, edema or PND  Gastrointestinal: Denies poor appetite, indigestion, abdominal pain or blood in stool  Urinary: Denies dysuria, pyuria  Musculoskeletal: Denies pain or swelling from muscles or joints  Neurologic: Denies tremor, paresthesias, or sensory motor disturbance  Skin: Denies rash, itching or texture change. Psych: Denies depression      Physical Exam:      General: Well developed, in no acute distress. HEENT: Eyes - PERRL, no jvd  Heart:  Normal S1/S2 negative S3 or S4. Regular, no murmur, gallop or rub.   Respiratory: Clear bilaterally x 4, no wheezing or rales  Abdomen:   Soft, non-tender, bowel sounds are active.   Extremities:  No edema, normal cap refill, no cyanosis. Musculoskeletal: No clubbing  Neuro: A&Ox3, speech clear, gait stable. Skin: Skin color is normal. No rashes or lesions.  Non diaphoretic  Vascular: 2+ pulses symmetric in all extremities    Cardiographics    Ekg: nsr with first degree av block    Monitor - transient chb - 4.4 sec pause    Results for orders placed or performed during the hospital encounter of 04/17/19   EKG, 12 LEAD, INITIAL   Result Value Ref Range    Ventricular Rate 75 BPM    Atrial Rate 75 BPM    P-R Interval 238 ms    QRS Duration 102 ms    Q-T Interval 394 ms    QTC Calculation (Bezet) 439 ms    Calculated P Axis 57 degrees    Calculated R Axis -28 degrees    Calculated T Axis -13 degrees    Diagnosis       Sinus rhythm with 1st degree AV block  Moderate voltage criteria for LVH, may be normal variant  Borderline ECG  When compared with ECG of 25-OCT-2017 12:40,  No significant change was found  Confirmed by Ishan Cline MD, --- ([id number]) on 4/18/2019 7:57:30 PM           Lab Results   Component Value Date/Time    WBC 6.9 04/19/2019 05:50 AM    HGB 10.6 (L) 04/19/2019 05:50 AM    HCT 32.7 (L) 04/19/2019 05:50 AM    PLATELET 187 26/90/5006 05:50 AM    MCV 99.1 (H) 04/19/2019 05:50 AM      Lab Results   Component Value Date/Time    Sodium 143 04/19/2019 05:50 AM    Potassium 4.0 04/19/2019 05:50 AM    Chloride 108 04/19/2019 05:50 AM    CO2 26 04/19/2019 05:50 AM    Anion gap 9 04/19/2019 05:50 AM    Glucose 126 (H) 04/19/2019 05:50 AM    BUN 10 04/19/2019 05:50 AM    Creatinine 0.78 04/19/2019 05:50 AM    BUN/Creatinine ratio 13 04/19/2019 05:50 AM    GFR est AA >60 04/19/2019 05:50 AM    GFR est non-AA >60 04/19/2019 05:50 AM    Calcium 8.1 (L) 04/19/2019 05:50 AM    Bilirubin, total 0.6 04/17/2019 11:24 AM    AST (SGOT) 19 04/17/2019 11:24 AM    Alk. phosphatase 67 04/17/2019 11:24 AM    Protein, total 7.1 04/17/2019 11:24 AM    Albumin 3.4 (L) 04/17/2019 11:24 AM    Globulin 3.7 04/17/2019 11:24 AM    A-G Ratio 0.9 (L) 04/17/2019 11:24 AM    ALT (SGPT) 24 04/17/2019 11:24 AM         Assessment:     Assessment:        ICD-10-CM ICD-9-CM    1. PAT (paroxysmal atrial tachycardia) (HCC) I47.1 427.0 AMB POC EKG ROUTINE W/ 12 LEADS, INTER & REP   2. Essential hypertension I10 401.9 CBC WITH AUTOMATED DIFF      PROTHROMBIN TIME + INR      METABOLIC PANEL, COMPREHENSIVE      XR CHEST PA LAT   3. CHB (complete heart block) (HCC) I44.2 426.0 CBC WITH AUTOMATED DIFF      PROTHROMBIN TIME + INR      METABOLIC PANEL, COMPREHENSIVE      XR CHEST PA LAT   4. Dyslipidemia E78.5 272.4 CBC WITH AUTOMATED DIFF      PROTHROMBIN TIME + INR      METABOLIC PANEL, COMPREHENSIVE      XR CHEST PA LAT     Orders Placed This Encounter    XR CHEST PA LAT     Standing Status:   Future     Standing Expiration Date:   8/16/2020     Order Specific Question:   Reason for Exam     Answer:   pre op     Order Specific Question:   Is Patient Allergic to Contrast Dye? Answer:   Unknown    CBC WITH AUTOMATED DIFF    PROTHROMBIN TIME + INR    METABOLIC PANEL, COMPREHENSIVE    AMB POC EKG ROUTINE W/ 12 LEADS, INTER & REP     Order Specific Question:   Reason for Exam:     Answer:   routine    tamsulosin (FLOMAX) 0.4 mg capsule     Sig: Take 0.4 mg by mouth daily. Plan:   Mr Kerry Chandra is a pleasant gentleman with PAT who had symptomatic transient CHB on a monitor. He will bb therapy for his PAT. He is a candidate for a pacemaker. I discussed the risks/benefits/alternatives of the procedure with the patient. Risks include (but are not limited to) bleeding, heart block, infection, cva/mi/tamponade/death. The patient understands and agrees to proceed. Thank you for this interesting consultation. Continue medical management for PAT, chb and htn. Thank you for allowing me to participate in Ilir Diego 's care.     Maryjane Chow MD, María Elena Mejia

## 2019-07-18 LAB
ALBUMIN SERPL-MCNC: 4 G/DL (ref 3.5–4.7)
ALBUMIN/GLOB SERPL: 1.2 {RATIO} (ref 1.2–2.2)
ALP SERPL-CCNC: 64 IU/L (ref 39–117)
ALT SERPL-CCNC: 15 IU/L (ref 0–44)
AST SERPL-CCNC: 22 IU/L (ref 0–40)
BASOPHILS # BLD AUTO: 0 X10E3/UL (ref 0–0.2)
BASOPHILS NFR BLD AUTO: 0 %
BILIRUB SERPL-MCNC: 0.4 MG/DL (ref 0–1.2)
BUN SERPL-MCNC: 14 MG/DL (ref 8–27)
BUN/CREAT SERPL: 16 (ref 10–24)
CALCIUM SERPL-MCNC: 9 MG/DL (ref 8.6–10.2)
CHLORIDE SERPL-SCNC: 104 MMOL/L (ref 96–106)
CO2 SERPL-SCNC: 22 MMOL/L (ref 20–29)
CREAT SERPL-MCNC: 0.86 MG/DL (ref 0.76–1.27)
EOSINOPHIL # BLD AUTO: 0.3 X10E3/UL (ref 0–0.4)
EOSINOPHIL NFR BLD AUTO: 5 %
ERYTHROCYTE [DISTWIDTH] IN BLOOD BY AUTOMATED COUNT: 13.6 % (ref 12.3–15.4)
GLOBULIN SER CALC-MCNC: 3.4 G/DL (ref 1.5–4.5)
GLUCOSE SERPL-MCNC: 147 MG/DL (ref 65–99)
HCT VFR BLD AUTO: 42.7 % (ref 37.5–51)
HGB BLD-MCNC: 13.9 G/DL (ref 13–17.7)
IMM GRANULOCYTES # BLD AUTO: 0 X10E3/UL (ref 0–0.1)
IMM GRANULOCYTES NFR BLD AUTO: 0 %
INR PPP: 1 (ref 0.8–1.2)
LYMPHOCYTES # BLD AUTO: 2.2 X10E3/UL (ref 0.7–3.1)
LYMPHOCYTES NFR BLD AUTO: 33 %
MCH RBC QN AUTO: 29.6 PG (ref 26.6–33)
MCHC RBC AUTO-ENTMCNC: 32.6 G/DL (ref 31.5–35.7)
MCV RBC AUTO: 91 FL (ref 79–97)
MONOCYTES # BLD AUTO: 0.7 X10E3/UL (ref 0.1–0.9)
MONOCYTES NFR BLD AUTO: 10 %
NEUTROPHILS # BLD AUTO: 3.5 X10E3/UL (ref 1.4–7)
NEUTROPHILS NFR BLD AUTO: 52 %
PLATELET # BLD AUTO: 219 X10E3/UL (ref 150–450)
POTASSIUM SERPL-SCNC: 4.2 MMOL/L (ref 3.5–5.2)
PROT SERPL-MCNC: 7.4 G/DL (ref 6–8.5)
PROTHROMBIN TIME: 10.6 SEC (ref 9.1–12)
RBC # BLD AUTO: 4.69 X10E6/UL (ref 4.14–5.8)
SODIUM SERPL-SCNC: 141 MMOL/L (ref 134–144)
WBC # BLD AUTO: 6.8 X10E3/UL (ref 3.4–10.8)

## 2019-07-19 ENCOUNTER — APPOINTMENT (OUTPATIENT)
Dept: GENERAL RADIOLOGY | Age: 84
End: 2019-07-19
Attending: INTERNAL MEDICINE
Payer: MEDICARE

## 2019-07-19 ENCOUNTER — HOSPITAL ENCOUNTER (OUTPATIENT)
Age: 84
Discharge: HOME | End: 2019-07-19
Attending: INTERNAL MEDICINE | Admitting: INTERNAL MEDICINE
Payer: MEDICARE

## 2019-07-19 VITALS
HEART RATE: 74 BPM | BODY MASS INDEX: 28.45 KG/M2 | RESPIRATION RATE: 20 BRPM | SYSTOLIC BLOOD PRESSURE: 158 MMHG | WEIGHT: 177 LBS | DIASTOLIC BLOOD PRESSURE: 93 MMHG | OXYGEN SATURATION: 94 % | HEIGHT: 66 IN

## 2019-07-19 DIAGNOSIS — R00.1 BRADYCARDIA: ICD-10-CM

## 2019-07-19 PROBLEM — I49.5 SSS (SICK SINUS SYNDROME) (HCC): Status: ACTIVE | Noted: 2019-07-19

## 2019-07-19 PROCEDURE — C1898 LEAD, PMKR, OTHER THAN TRANS: HCPCS | Performed by: INTERNAL MEDICINE

## 2019-07-19 PROCEDURE — 77030002996 HC SUT SLK J&J -A: Performed by: INTERNAL MEDICINE

## 2019-07-19 PROCEDURE — 99153 MOD SED SAME PHYS/QHP EA: CPT | Performed by: INTERNAL MEDICINE

## 2019-07-19 PROCEDURE — 77030031139 HC SUT VCRL2 J&J -A: Performed by: INTERNAL MEDICINE

## 2019-07-19 PROCEDURE — C1769 GUIDE WIRE: HCPCS | Performed by: INTERNAL MEDICINE

## 2019-07-19 PROCEDURE — 74011250636 HC RX REV CODE- 250/636: Performed by: INTERNAL MEDICINE

## 2019-07-19 PROCEDURE — C1751 CATH, INF, PER/CENT/MIDLINE: HCPCS | Performed by: INTERNAL MEDICINE

## 2019-07-19 PROCEDURE — 74011250636 HC RX REV CODE- 250/636

## 2019-07-19 PROCEDURE — 74011250637 HC RX REV CODE- 250/637: Performed by: INTERNAL MEDICINE

## 2019-07-19 PROCEDURE — 71045 X-RAY EXAM CHEST 1 VIEW: CPT

## 2019-07-19 PROCEDURE — C1894 INTRO/SHEATH, NON-LASER: HCPCS | Performed by: INTERNAL MEDICINE

## 2019-07-19 PROCEDURE — C1785 PMKR, DUAL, RATE-RESP: HCPCS | Performed by: INTERNAL MEDICINE

## 2019-07-19 PROCEDURE — 77030018673: Performed by: INTERNAL MEDICINE

## 2019-07-19 PROCEDURE — 77030019580 HC CBL PACE MEDT -B: Performed by: INTERNAL MEDICINE

## 2019-07-19 PROCEDURE — 74011000250 HC RX REV CODE- 250: Performed by: INTERNAL MEDICINE

## 2019-07-19 PROCEDURE — C1893 INTRO/SHEATH, FIXED,NON-PEEL: HCPCS | Performed by: INTERNAL MEDICINE

## 2019-07-19 PROCEDURE — 99152 MOD SED SAME PHYS/QHP 5/>YRS: CPT | Performed by: INTERNAL MEDICINE

## 2019-07-19 PROCEDURE — 77030037713 HC CLOSR DEV INCIS ZIP STRY -B: Performed by: INTERNAL MEDICINE

## 2019-07-19 PROCEDURE — 33208 INSRT HEART PM ATRIAL & VENT: CPT | Performed by: INTERNAL MEDICINE

## 2019-07-19 DEVICE — LEAD PCMKR CAPSUR FIX NOVUS 52 --: Type: IMPLANTABLE DEVICE | Status: FUNCTIONAL

## 2019-07-19 DEVICE — PCMKR AZURE XT DR MRI --: Type: IMPLANTABLE DEVICE | Status: FUNCTIONAL

## 2019-07-19 DEVICE — LEAD PCMKR CAPSUR FIX NOVUS 58 --: Type: IMPLANTABLE DEVICE | Status: FUNCTIONAL

## 2019-07-19 RX ORDER — ACETAMINOPHEN 325 MG/1
650 TABLET ORAL
Status: DISCONTINUED | OUTPATIENT
Start: 2019-07-19 | End: 2019-07-19 | Stop reason: HOSPADM

## 2019-07-19 RX ORDER — BACITRACIN 50000 [IU]/1
INJECTION, POWDER, FOR SOLUTION INTRAMUSCULAR AS NEEDED
Status: DISCONTINUED | OUTPATIENT
Start: 2019-07-19 | End: 2019-07-19 | Stop reason: HOSPADM

## 2019-07-19 RX ORDER — CEFAZOLIN SODIUM/WATER 2 G/20 ML
2 SYRINGE (ML) INTRAVENOUS ONCE
Status: COMPLETED | OUTPATIENT
Start: 2019-07-19 | End: 2019-07-19

## 2019-07-19 RX ORDER — LIDOCAINE HYDROCHLORIDE 10 MG/ML
INJECTION, SOLUTION EPIDURAL; INFILTRATION; INTRACAUDAL; PERINEURAL AS NEEDED
Status: DISCONTINUED | OUTPATIENT
Start: 2019-07-19 | End: 2019-07-19 | Stop reason: HOSPADM

## 2019-07-19 RX ORDER — HEPARIN SODIUM 200 [USP'U]/100ML
INJECTION, SOLUTION INTRAVENOUS
Status: COMPLETED | OUTPATIENT
Start: 2019-07-19 | End: 2019-07-19

## 2019-07-19 RX ORDER — MIDAZOLAM HYDROCHLORIDE 1 MG/ML
INJECTION, SOLUTION INTRAMUSCULAR; INTRAVENOUS AS NEEDED
Status: DISCONTINUED | OUTPATIENT
Start: 2019-07-19 | End: 2019-07-19 | Stop reason: HOSPADM

## 2019-07-19 RX ORDER — NALOXONE HYDROCHLORIDE 0.4 MG/ML
0.4 INJECTION, SOLUTION INTRAMUSCULAR; INTRAVENOUS; SUBCUTANEOUS AS NEEDED
Status: DISCONTINUED | OUTPATIENT
Start: 2019-07-19 | End: 2019-07-19 | Stop reason: HOSPADM

## 2019-07-19 RX ORDER — FENTANYL CITRATE 50 UG/ML
INJECTION, SOLUTION INTRAMUSCULAR; INTRAVENOUS AS NEEDED
Status: DISCONTINUED | OUTPATIENT
Start: 2019-07-19 | End: 2019-07-19 | Stop reason: HOSPADM

## 2019-07-19 RX ORDER — SODIUM CHLORIDE 0.9 % (FLUSH) 0.9 %
5-40 SYRINGE (ML) INJECTION EVERY 8 HOURS
Status: DISCONTINUED | OUTPATIENT
Start: 2019-07-19 | End: 2019-07-19 | Stop reason: HOSPADM

## 2019-07-19 RX ORDER — SODIUM CHLORIDE 0.9 % (FLUSH) 0.9 %
5-40 SYRINGE (ML) INJECTION AS NEEDED
Status: DISCONTINUED | OUTPATIENT
Start: 2019-07-19 | End: 2019-07-19 | Stop reason: HOSPADM

## 2019-07-19 RX ADMIN — Medication 2 G: at 11:48

## 2019-07-19 RX ADMIN — ACETAMINOPHEN 650 MG: 325 TABLET ORAL at 15:21

## 2019-07-19 NOTE — Clinical Note
Patient transported with a Registered Nurse and [address] [place] [address] / Patient [facility]. Patient transported to: IVCU.

## 2019-07-19 NOTE — PROGRESS NOTES
Ambulate with pt in devries to BR. Gait steady. Left anterior chest wall dressing dry and intact. Pt denies c/o. DC instructions reviewed with pt and son. Both verbalize understanding. SL dc'd without difficulty. Pt dc'd to home with son via car. ROBB

## 2019-07-19 NOTE — Clinical Note
Patient transported with a Registered Nurse and [address] [place] [address] / [facility].

## 2019-07-19 NOTE — Clinical Note
A Bovie was used. Blend setting: blend. Mode: bipolar. Coagulation Settin.  Cut Settin. Site (pad location): anterior thigh.

## 2019-07-19 NOTE — PROGRESS NOTES
Cardiac Cath Lab Recovery Arrival Note:      Robbin Marte arrived to Cardiac Cath Lab, Recovery Area. Staff introduced to patient. Patient identifiers verified with NAME and DATE OF BIRTH. Procedure verified with patient. Consent forms reviewed and signed by patient or authorized representative and verified. Allergies verified. Patient and family oriented to department. Patient and family informed of procedure and plan of care. Questions answered with review. Patient prepped for procedure, per orders from physician, prior to arrival.    Patient on cardiac monitor, non-invasive blood pressure, SPO2 monitor. On room air. Patient is A&Ox 3. Patient reports no c/o. Patient in stretcher, in low position, with side rails up, call bell within reach, patient instructed to call if assistance as needed. Patient prep in: [address], [address]. Patient family has pager # none  Family in: [facility] waiting area.    Prep by: Sue Moss RN and Osei Samuel RN

## 2019-07-19 NOTE — DISCHARGE INSTRUCTIONS
[address] [place] [address]  [phone]        NEW PACEMAKER IMPLANT DISCHARGE INSTRUCTIONS    Patient ID:  Ilir Diego  [id number]  58 y.o.  12/14/1932    Admit Date: 7/19/2019    Discharge Date: 7/19/2019     Admitting Physician: Maryjane Chow MD     Discharge Physician: Maryjane Chow MD    Admission Diagnoses:   Bradycardia [R00.1]    Discharge Diagnoses: Active Problems:    * No active hospital problems. *      Discharge Condition: Good    Cardiology Procedures this Admission:  Pacemaker insertion. Disposition: home    Reference discharge instructions provided by nursing for diet and activity. Follow-up with device clinic in three weeks. Call [phone] to make an appointment. Signed:  ROLDAN Esteves  7/19/2019  12:25 PM      DISCHARGE INSTRUCTIONS FOR PATIENTS WITH PACEMAKERS    1. Remember to call for an appointment for 3 weeks [phone] to check healing and implant programming. 2. Medic Alert Bracelets are available from your pharmacist to wear at all times if you choose to wear one. 3. Carry your ID card for pacemaker with you at all times. This card will be given to you in the hospital or mailed to you. 4. The pacemaker will bulge slightly under your skin. The bulge will decrease in size over the next few weeks. Please notify the doctor's office if you notice any of the following around your site:   A.  A bruise that does not go away. B.  Soreness or yellow, green, or brown drainage from the site. C. Any swelling from the site. D. If you have a fever of 100 degrees or higher that lasts for a few days. INCISION CARE       1.  Leave the dressing over your site until your follow up visit. 2.  You may not shower until after follow up visit. 3.  For comfort, wear loose fitting clothing. 1. 4.  Ice pack to affected shoulder for first 24 hours, wear your sling for 2 days.   2. 5.  Report any signs of infection, fever, pain, swelling, redness, oozing, or heat at site especially if these symptoms increase after the first 3 to 4 days. ACTIVITY PRECAUTIONS     1. Avoid rough contact with the implant site. 2. No driving for 14 days. 3. Avoid lifting your arm over your head, carrying anything on the affected side, or lifting over 10 pounds for 30 days. For the first 2 days only bend your arm at the elbow. 4. Any extreme activity such as golf, weight lifting or exercise biking should be restricted for 60 days. 5. Do not carry objects by holding them against your implant site. 6.  No shooting rifles or any type of gun with the affected shoulder permanently. SPECIAL PRECAUTIONS     1. You should avoid all strong magnetic fields, such as arc welding, large transformers, large motors. 2.  You may or may not (depending on your device) have an MRI which uses a strong magnet to take pictures. 3.  Treatments or surgery that requires diathermy or electrocautery should be discussed with your doctor before scheduled. 4. Avoid radio frequency transmitters, including radar. 5. Advise dentist or other medical personnel you see that you have a pacemaker. 6.  Cell phones and microwave oven use is okay. 7.  If you plan to move or take a trip to a new area, the doctor's office will give you a name of a doctor to contact for any problems. ANTIBIOTIC THERAPY    During the first 8 weeks after your pacemaker insertion, you may need antibiotics before any dental work or certain tests or operations. Let the dentist or doctor who is caring for you know that you have had an implanted device.

## 2019-07-19 NOTE — INTERVAL H&P NOTE
H&P Update:  aMlka Feng was seen and examined. History and physical has been reviewed. The patient has been examined.  There have been no significant clinical changes since the completion of the originally dated History and Physical.

## 2019-07-19 NOTE — Clinical Note
TRANSFER - OUT REPORT:  
 
Verbal report given to: Lily Alonso. Report consisted of patient's Situation, Background, Assessment and  
Recommendations(SBAR). Opportunity for questions and clarification was provided. Patient transported with a Registered Nurse and [address] [place] [address] / [facility]. Patient transported to: ivcu/rr.

## 2019-07-19 NOTE — Clinical Note
Sheath #1: Dressed using non-adherent and transparent dressing. Site: clean, dry, & intact, no bleeding and no hematoma.

## 2019-07-31 ENCOUNTER — TELEPHONE (OUTPATIENT)
Dept: CARDIOLOGY CLINIC | Age: 84
End: 2019-07-31

## 2019-07-31 NOTE — TELEPHONE ENCOUNTER
pateint called and want to know if the Metoprolol that Dr. Efren Espinoza put him on prior to his pacemaker being implanted was still necessary. Please call [phone].

## 2019-08-06 ENCOUNTER — CLINICAL SUPPORT (OUTPATIENT)
Dept: CARDIOLOGY CLINIC | Age: 84
End: 2019-08-06

## 2019-08-06 DIAGNOSIS — R00.1 BRADYCARDIA: ICD-10-CM

## 2019-08-06 DIAGNOSIS — Z51.89 VISIT FOR WOUND CHECK: ICD-10-CM

## 2019-08-06 DIAGNOSIS — I44.2 CHB (COMPLETE HEART BLOCK) (HCC): ICD-10-CM

## 2019-08-06 DIAGNOSIS — Z95.0 PACEMAKER: ICD-10-CM

## 2019-08-06 DIAGNOSIS — Z95.0 CARDIAC PACEMAKER IN SITU: Primary | ICD-10-CM

## 2019-08-06 DIAGNOSIS — I47.1 PAT (PAROXYSMAL ATRIAL TACHYCARDIA) (HCC): Primary | ICD-10-CM

## 2019-08-06 NOTE — PROGRESS NOTES
Dee Pandya  12/14/1932  Orin Beth MD          Subjective:    Patient is here for 2 week site check and device interrogation after pacemaker implantation. The patient denies chest pain/shortness of breath or fevers. Patient Active Problem List    Diagnosis Date Noted    SSS (sick sinus syndrome) ([facility] [address] 07/19/2019    PAT (paroxysmal atrial tachycardia) (HCC)     Hypertension     Dyslipidemia     GI bleed 04/17/2019    OA (osteoarthritis) 04/17/2019    GERD (gastroesophageal reflux disease) 04/17/2019    HTN (hypertension) 04/17/2019    Prostate CA ([facility] [address] 04/17/2019      Past Medical History:   Diagnosis Date    Arthritis     Dyslipidemia     GERD (gastroesophageal reflux disease)     GI bleed     History of prostate surgery     Hypertension     PAT (paroxysmal atrial tachycardia) (HCC)     Prostate enlargement       Past Surgical History:   Procedure Laterality Date    HX COLONOSCOPY  2004    HX ORTHOPAEDIC      multiple shoulder procedures    HX PROSTATECTOMY      HX UROLOGICAL      MN INS NEW/RPLCMT PRM PM W/TRANSV ELTRD ATRIAL&VENT N/A 7/19/2019    INSERT PPM DUAL performed by Winifred Gipson MD at [facility] CARDIAC CATH LAB     No Known Allergies   Family History   Problem Relation Age of Onset    No Known Problems Mother       Current Outpatient Medications   Medication Sig    tamsulosin (FLOMAX) 0.4 mg capsule Take 0.4 mg by mouth daily.  metoprolol succinate (TOPROL-XL) 25 mg XL tablet Take 1 Tab by mouth daily.  vit C/E/Zn/coppr/lutein/zeaxan (PRESERVISION AREDS-2 PO) Take 1 Tab by mouth daily.  lovastatin (MEVACOR) 20 mg tablet Take 20 mg by mouth nightly.  pantoprazole (PROTONIX) 40 mg tablet Take 40 mg by mouth daily. No current facility-administered medications for this visit. Review of Systems:    General: Pt denies excessive weight gain or loss.  Pt is able to conduct ADL's  Respiratory: Denies shortness of breath, RANGEL, wheezing or stridor. Cardiovascular: Denies precordial pain, palpitations, edema or PND        Physical ExamPhysical Exam:      General: Well developed, in no acute distress. .  Chest: left subclavian pacer site approximated well  Neuro: A&Ox3, speech clear, gait stable. Assessment:        ICD-10-CM ICD-9-CM    1. PAT (paroxysmal atrial tachycardia) (HCC) I47.1 427.0    2. Bradycardia R00.1 427.89    3. Visit for wound check Z51.89 V58.89    4. Pacemaker Z95.0 V45.01         Plan:     Patient feels well following pacemaker implantation. Left subclavian pacemaker site approximated well, no discharge. No erythema or heat. Properly functioning device with 2 AT episodes - asymptomatic. Follow up as planned in 3 mo, enrolled in remote monitoring.      Ni Yarbrough, ANP

## 2019-08-07 ENCOUNTER — TELEPHONE (OUTPATIENT)
Dept: CARDIOLOGY CLINIC | Age: 84
End: 2019-08-07

## 2019-08-07 NOTE — TELEPHONE ENCOUNTER
Pt would like a call in regards to needing a colonoscopy and having a pacemaker.     Thanks, [facility] Rayna

## 2019-08-07 NOTE — TELEPHONE ENCOUNTER
Called, spoke to pt, two identifiers verified. Advised he will need to wait until after his 3 month visit with Dr. Jaaml Nicole before a colonoscopy. Pt verbalized understanding of information discussed w/ no further questions at this time.      Shanice Jackson RN

## 2019-11-07 ENCOUNTER — OFFICE VISIT (OUTPATIENT)
Dept: CARDIOLOGY CLINIC | Age: 84
End: 2019-11-07

## 2019-11-07 ENCOUNTER — CLINICAL SUPPORT (OUTPATIENT)
Dept: CARDIOLOGY CLINIC | Age: 84
End: 2019-11-07

## 2019-11-07 VITALS
HEART RATE: 73 BPM | OXYGEN SATURATION: 96 % | DIASTOLIC BLOOD PRESSURE: 70 MMHG | RESPIRATION RATE: 16 BRPM | SYSTOLIC BLOOD PRESSURE: 142 MMHG | HEIGHT: 66 IN | WEIGHT: 179.4 LBS | BODY MASS INDEX: 28.83 KG/M2

## 2019-11-07 DIAGNOSIS — R06.02 SOB (SHORTNESS OF BREATH) ON EXERTION: ICD-10-CM

## 2019-11-07 DIAGNOSIS — I47.1 PAT (PAROXYSMAL ATRIAL TACHYCARDIA) (HCC): ICD-10-CM

## 2019-11-07 DIAGNOSIS — I49.5 SSS (SICK SINUS SYNDROME) (HCC): Primary | ICD-10-CM

## 2019-11-07 DIAGNOSIS — I10 ESSENTIAL HYPERTENSION: ICD-10-CM

## 2019-11-07 DIAGNOSIS — Z95.0 CARDIAC PACEMAKER IN SITU: Primary | ICD-10-CM

## 2019-11-07 DIAGNOSIS — I47.20 V TACH: ICD-10-CM

## 2019-11-07 DIAGNOSIS — E78.5 DYSLIPIDEMIA: ICD-10-CM

## 2019-11-07 DIAGNOSIS — I44.1 AV BLOCK, MOBITZ II: ICD-10-CM

## 2019-11-07 DIAGNOSIS — I44.2 CHB (COMPLETE HEART BLOCK) (HCC): ICD-10-CM

## 2019-11-07 DIAGNOSIS — Z95.0 CARDIAC PACEMAKER IN SITU: ICD-10-CM

## 2019-11-07 NOTE — PROGRESS NOTES
1. Have you been to the ER, urgent care clinic since your last visit? Hospitalized since your last visit? NO    2. Have you seen or consulted any other health care providers outside of the [address] [place] [address] since your last visit? Include any pap smears or colon screening. YES, PCP.    3 MONTH FOLLOW UP. C/O NO ENERGY, SOB.

## 2019-11-07 NOTE — PROGRESS NOTES
Subjective:      Jerrica Veloz is a 80 y.o. male is here for EP consult. He has sob with exertion and increased fatigue. Patient Active Problem List    Diagnosis Date Noted    SSS (sick sinus syndrome) ([facility] 07/19/2019    PAT (paroxysmal atrial tachycardia) (HCC)     Hypertension     Dyslipidemia     GI bleed 04/17/2019    OA (osteoarthritis) 04/17/2019    GERD (gastroesophageal reflux disease) 04/17/2019    HTN (hypertension) 04/17/2019    Prostate CA ([facility] 04/17/2019      Orin Beth MD  Past Medical History:   Diagnosis Date    Arthritis     Dyslipidemia     GERD (gastroesophageal reflux disease)     GI bleed     History of prostate surgery     Hypertension     PAT (paroxysmal atrial tachycardia) (HCC)     Prostate enlargement       Past Surgical History:   Procedure Laterality Date    HX COLONOSCOPY  2004    HX ORTHOPAEDIC      multiple shoulder procedures    HX PROSTATECTOMY      HX UROLOGICAL      OR INS NEW/RPLCMT PRM PM W/TRANSV ELTRD ATRIAL&VENT N/A 7/19/2019    INSERT PPM DUAL performed by Glory Butler MD at [facility] CARDIAC CATH LAB     No Known Allergies   Family History   Problem Relation Age of Onset    No Known Problems Mother     negative for cardiac disease  Social History     Socioeconomic History    Marital status:      Spouse name: Not on file    Number of children: Not on file    Years of education: Not on file    Highest education level: Not on file   Tobacco Use    Smoking status: Former Smoker    Smokeless tobacco: Never Used    Tobacco comment: quit 30 years ago   Substance and Sexual Activity    Alcohol use: Yes     Alcohol/week: 14.0 standard drinks     Types: 14 Shots of liquor per week     Comment: Per week     Drug use: No   Other Topics Concern     Current Outpatient Medications   Medication Sig    tamsulosin (FLOMAX) 0.4 mg capsule Take 0.4 mg by mouth daily.     metoprolol succinate (TOPROL-XL) 25 mg XL tablet Take 1 Tab by mouth daily.  vit C/E/Zn/coppr/lutein/zeaxan (PRESERVISION AREDS-2 PO) Take 1 Tab by mouth daily.  lovastatin (MEVACOR) 20 mg tablet Take 20 mg by mouth nightly.  pantoprazole (PROTONIX) 40 mg tablet Take 40 mg by mouth daily. No current facility-administered medications for this visit. Vitals:    11/07/19 1053   BP: 142/70   Pulse: 73   Resp: 16   SpO2: 96%   Weight: 179 lb 6.4 oz (81.4 kg)   Height: 5' 6\" (1.676 m)       I have reviewed the nurses notes, vitals, problem list, allergy list, medical history, family, social history and medications. Review of Symptoms:    General: Pt denies excessive weight gain or loss. Pt is able to conduct ADL's  HEENT: Denies blurred vision, headaches, epistaxis and difficulty swallowing. Respiratory: + shortness of breath, RANGEL, denies wheezing or stridor. Cardiovascular: Denies precordial pain, palpitations, edema or PND  Gastrointestinal: Denies poor appetite, indigestion, abdominal pain or blood in stool  Urinary: Denies dysuria, pyuria  Musculoskeletal: Denies pain or swelling from muscles or joints  Neurologic: Denies tremor, paresthesias, or sensory motor disturbance  Skin: Denies rash, itching or texture change. Psych: Denies depression      Physical Exam:      General: Well developed, in no acute distress. HEENT: Eyes - PERRL, no jvd  Heart:  Normal S1/S2 negative S3 or S4. Regular, no murmur, gallop or rub.   Respiratory: Clear bilaterally x 4, no wheezing or rales  Abdomen:   Soft, non-tender, bowel sounds are active.   Extremities:  No edema, normal cap refill, no cyanosis. Musculoskeletal: No clubbing  Neuro: A&Ox3, speech clear, gait stable. Skin: Skin color is normal. No rashes or lesions.  Non diaphoretic  Vascular: 2+ pulses symmetric in all extremities    Cardiographics    Ekg: nsr with pacs, first degree av block    Results for orders placed or performed during the hospital encounter of 04/17/19   EKG, 12 LEAD, INITIAL   Result Value Ref Range    Ventricular Rate 75 BPM    Atrial Rate 75 BPM    P-R Interval 238 ms    QRS Duration 102 ms    Q-T Interval 394 ms    QTC Calculation (Bezet) 439 ms    Calculated P Axis 57 degrees    Calculated R Axis -28 degrees    Calculated T Axis -13 degrees    Diagnosis       Sinus rhythm with 1st degree AV block  Moderate voltage criteria for LVH, may be normal variant  Borderline ECG  When compared with ECG of 25-OCT-2017 12:40,  No significant change was found  Confirmed by Suad Ma MD, --- ([id number]) on 4/18/2019 7:57:30 PM           Lab Results   Component Value Date/Time    WBC 6.8 07/17/2019 12:02 PM    HGB 13.9 07/17/2019 12:02 PM    HCT 42.7 07/17/2019 12:02 PM    PLATELET 158 65/06/1098 12:02 PM    MCV 91 07/17/2019 12:02 PM      Lab Results   Component Value Date/Time    Sodium 141 07/17/2019 12:02 PM    Potassium 4.2 07/17/2019 12:02 PM    Chloride 104 07/17/2019 12:02 PM    CO2 22 07/17/2019 12:02 PM    Anion gap 9 04/19/2019 05:50 AM    Glucose 147 (H) 07/17/2019 12:02 PM    BUN 14 07/17/2019 12:02 PM    Creatinine 0.86 07/17/2019 12:02 PM    BUN/Creatinine ratio 16 07/17/2019 12:02 PM    GFR est AA 91 07/17/2019 12:02 PM    GFR est non-AA 79 07/17/2019 12:02 PM    Calcium 9.0 07/17/2019 12:02 PM    Bilirubin, total 0.4 07/17/2019 12:02 PM    AST (SGOT) 22 07/17/2019 12:02 PM    Alk. phosphatase 64 07/17/2019 12:02 PM    Protein, total 7.4 07/17/2019 12:02 PM    Albumin 4.0 07/17/2019 12:02 PM    Globulin 3.7 04/17/2019 11:24 AM    A-G Ratio 1.2 07/17/2019 12:02 PM    ALT (SGPT) 15 07/17/2019 12:02 PM         Assessment:     Assessment:        ICD-10-CM ICD-9-CM    1. SSS (sick sinus syndrome) (HCC) I49.5 427.81 AMB POC EKG ROUTINE W/ 12 LEADS, INTER & REP   2. PAT (paroxysmal atrial tachycardia) (HCC) I47.1 427.0    3. Essential hypertension I10 401.9    4. Dyslipidemia E78.5 272.4    5. SOB (shortness of breath) on exertion R06.02 786.05    6.  AV block, Mobitz II I44.1 426.12      Orders Placed This Encounter    AMB POC EKG ROUTINE W/ 12 LEADS, INTER & REP     Order Specific Question:   Reason for Exam:     Answer:   ROUTINE        Plan:   Mr Lit Lay is having sob/fatigue. V pacing 69% for mobitz II HEART block. Will adjust his settings to decrease his v pacing and see if that improves symptoms. A paced 18% for sick sinus. Will obtain an echo to reassess lvef and a stress test to r/o ischemia (to be done at [facility]). Cont med rx for htn and hyperlipidemia. Still having PAT on monitor. Post stress test - will consider AAD or ablation. F/u post testing. Thank you for allowing me to participate in Freeman Monsalve 's care.     Heri Ellis MD, Nahun Santos

## 2019-11-08 DIAGNOSIS — I10 ESSENTIAL HYPERTENSION: ICD-10-CM

## 2019-11-08 DIAGNOSIS — I47.20 V TACH: ICD-10-CM

## 2019-11-08 DIAGNOSIS — I47.1 PAT (PAROXYSMAL ATRIAL TACHYCARDIA) (HCC): ICD-10-CM

## 2019-11-08 DIAGNOSIS — I49.5 SSS (SICK SINUS SYNDROME) (HCC): Primary | ICD-10-CM

## 2019-11-08 DIAGNOSIS — Z95.0 CARDIAC PACEMAKER IN SITU: ICD-10-CM

## 2019-11-11 ENCOUNTER — TELEPHONE (OUTPATIENT)
Dept: CARDIOLOGY CLINIC | Age: 84
End: 2019-11-11

## 2019-11-11 NOTE — TELEPHONE ENCOUNTER
Pt called and was requesting to speak with you. He stated that he was returning a phone call back. Please call back @ [phone]. Thank you!

## 2019-11-11 NOTE — TELEPHONE ENCOUNTER
Pt is scheduled for a nmst tomorrow on 11/12 at [facility]. Pt wishes to have the echocardiogram performed at [facility]. CS number given. Spoke with the patient. Verified patient with two patient identifiers. Questions answered. Patient verbalized understanding.

## 2019-11-13 ENCOUNTER — TELEPHONE (OUTPATIENT)
Dept: CARDIOLOGY CLINIC | Age: 84
End: 2019-11-13

## 2019-11-13 NOTE — TELEPHONE ENCOUNTER
----- Message from Brigette Hassan MD sent at 11/13/2019 10:47 AM EST -----  Regarding: Moraima Chao MPI  Advise stress nuclear scan looks normal--no blockages or ischemia, normal LV pumping function. Thanks [employer]    Spoke with the patient. Verified patient with two patient identifiers. Results given and questions answered. Echocardiogram has not been scheduled. CS number was given to the pt. Patient verbalized understanding.

## 2019-11-14 ENCOUNTER — TELEPHONE (OUTPATIENT)
Dept: CARDIOLOGY CLINIC | Age: 84
End: 2019-11-14

## 2019-11-20 ENCOUNTER — TELEPHONE (OUTPATIENT)
Dept: CARDIOLOGY CLINIC | Age: 84
End: 2019-11-20

## 2019-11-20 NOTE — TELEPHONE ENCOUNTER
----- Message from Taylor Whitten MD sent at 11/19/2019 12:01 PM EST -----  Regarding: echo  Advise echo looks good--normal LV pumping function, only mild valve abnormalities. Thanks [facility]    Spoke with the patient. Verified patient with two patient identifiers. Results given and questions answered. Dr. Penelope Whitten and Yohana Chavez NP would like to see the pt post testing. Message has been sent to [facility] regarding. Patient verbalized understanding.

## 2019-12-18 ENCOUNTER — OFFICE VISIT (OUTPATIENT)
Dept: CARDIOLOGY CLINIC | Age: 84
End: 2019-12-18

## 2019-12-18 VITALS
BODY MASS INDEX: 28.61 KG/M2 | SYSTOLIC BLOOD PRESSURE: 116 MMHG | HEART RATE: 90 BPM | HEIGHT: 66 IN | OXYGEN SATURATION: 97 % | RESPIRATION RATE: 14 BRPM | WEIGHT: 178 LBS | DIASTOLIC BLOOD PRESSURE: 84 MMHG

## 2019-12-18 DIAGNOSIS — R06.02 SOB (SHORTNESS OF BREATH) ON EXERTION: ICD-10-CM

## 2019-12-18 DIAGNOSIS — Z95.0 PACEMAKER: ICD-10-CM

## 2019-12-18 DIAGNOSIS — I10 ESSENTIAL HYPERTENSION: ICD-10-CM

## 2019-12-18 DIAGNOSIS — I47.1 PAT (PAROXYSMAL ATRIAL TACHYCARDIA) (HCC): ICD-10-CM

## 2019-12-18 DIAGNOSIS — I49.5 SSS (SICK SINUS SYNDROME) (HCC): Primary | ICD-10-CM

## 2019-12-18 DIAGNOSIS — E78.5 DYSLIPIDEMIA: ICD-10-CM

## 2019-12-18 NOTE — PROGRESS NOTES
Sharlot Cranker is a 80 y.o. male is here for routine f/u. Mild stable RANGEL, some fatigue. Seen by Dr. Rocky Harper in November--PPM adjusted. Stress MPI 11/12/19 with LVEF 63%, normal perfusion. Echo 11/19/19 with LVEF 65-70, mild AV thickening/no stenosis, otherwise normal. Continues to see PCP. Labs 11/11/19 with normal CBC, CMP, chol 166, , HDL47, TG 74. The patient denies chest pain, orthopnea, PND, LE edema, palpitations, syncope, presyncope.        Patient Active Problem List    Diagnosis Date Noted    SSS (sick sinus syndrome) ([facility] 07/19/2019    PAT (paroxysmal atrial tachycardia) (HCC)     Hypertension     Dyslipidemia     GI bleed 04/17/2019    OA (osteoarthritis) 04/17/2019    GERD (gastroesophageal reflux disease) 04/17/2019    HTN (hypertension) 04/17/2019    Prostate CA ([facility] 04/17/2019      Orin Beth MD  Past Medical History:   Diagnosis Date    Arthritis     Dyslipidemia     GERD (gastroesophageal reflux disease)     GI bleed     History of prostate surgery     Hypertension     PAT (paroxysmal atrial tachycardia) (HCC)     Prostate enlargement       Past Surgical History:   Procedure Laterality Date    HX COLONOSCOPY  2004    HX ORTHOPAEDIC      multiple shoulder procedures    HX PROSTATECTOMY      HX UROLOGICAL      [place] INS NEW/RPLCMT PRM PM W/TRANSV ELTRD ATRIAL&VENT N/A 7/19/2019    INSERT PPM DUAL performed by Jarred Sharp MD at [facility] CARDIAC CATH LAB     No Known Allergies   Family History   Problem Relation Age of Onset    No Known Problems Mother       Social History     Socioeconomic History    Marital status:      Spouse name: Not on file    Number of children: Not on file    Years of education: Not on file    Highest education level: Not on file   Occupational History    Not on file   Social Needs    Financial resource strain: Not on file    Food insecurity:     Worry: Not on file     Inability: Not on file   Yamli needs: Medical: Not on file     Non-medical: Not on file   Tobacco Use    Smoking status: Former Smoker    Smokeless tobacco: Never Used    Tobacco comment: quit 30 years ago   Substance and Sexual Activity    Alcohol use: Yes     Alcohol/week: 14.0 standard drinks     Types: 14 Shots of liquor per week     Comment: Per week     Drug use: No    Sexual activity: Not on file   Lifestyle    Physical activity:     Days per week: Not on file     Minutes per session: Not on file    Stress: Not on file   Relationships    Social connections:     Talks on phone: Not on file     Gets together: Not on file     Attends Islam service: Not on file     Active member of club or organization: Not on file     Attends meetings of clubs or organizations: Not on file     Relationship status: Not on file    Intimate partner violence:     Fear of current or ex partner: Not on file     Emotionally abused: Not on file     Physically abused: Not on file     Forced sexual activity: Not on file   Other Topics Concern     Service Not Asked    Blood Transfusions Not Asked    Caffeine Concern Not Asked    Occupational Exposure Not Asked   Mitzi [place] Hazards Not Asked    Sleep Concern Not Asked    Stress Concern Not Asked    Weight Concern Not Asked    Special Diet Not Asked    Back Care Not Asked    Exercise Not Asked    Bike Helmet Not Asked    Seat Belt Not Asked    Self-Exams Not Asked   Social History Narrative    Not on file      Current Outpatient Medications   Medication Sig    tamsulosin (FLOMAX) 0.4 mg capsule Take 0.4 mg by mouth daily.  vit C/E/Zn/coppr/lutein/zeaxan (PRESERVISION AREDS-2 PO) Take 1 Tab by mouth daily.  lovastatin (MEVACOR) 20 mg tablet Take 20 mg by mouth nightly.  pantoprazole (PROTONIX) 40 mg tablet Take 40 mg by mouth daily. No current facility-administered medications for this visit. Review of Symptoms:    CONST  No weight change.  No fever, chills, sweats ENT No visual changes, URI sx, sore throat    CV  See HPI   RESP  No cough, or sputum, wheezing. Also see HPI   GI  No abdominal pain or change in bowel habits. No heartburn or dysphagia. No melena or rectal bleeding.   No dysuria, urgency, frequency, hematuria   MSKEL  No joint pain, swelling. No muscle pain. SKIN  No rash or lesions. NEURO  No headache, syncope, or seizure. No weakness, loss of sensation, or paresthesias. PSYCH  No low mood or depression  No anxiety. HE/LYMPH  No easy bruising, abnormal bleeding, or enlarged glands.         Physical ExamPhysical Exam:    Visit Vitals  /84 (BP 1 Location: Left arm, BP Patient Position: Sitting)   Pulse 90   Resp 14   Ht 5' 6\" (1.676 m)   Wt 178 lb (80.7 kg)   SpO2 97% Comment: ra   BMI 28.73 kg/m²     Gen: NAD  HEENT:  PERRL, throat clear  Neck: no adenopathy, no thyromegaly, no JVD   Heart:  Regular,Nl S1S2,  no murmur, gallop or rub.   Lungs:  clear  Abdomen:   Soft, non-tender, bowel sounds are active.   Extremities:  No edema  Pulse: symmetric  Neuro: A&O times 3, No focal neuro deficits    Cardiographics    Labs:   Lab Results   Component Value Date/Time    Sodium 141 07/17/2019 12:02 PM    Sodium 143 04/19/2019 05:50 AM    Sodium 142 04/18/2019 06:07 AM    Sodium 142 04/17/2019 11:24 AM    Sodium 143 10/25/2017 01:06 PM    Potassium 4.2 07/17/2019 12:02 PM    Potassium 4.0 04/19/2019 05:50 AM    Potassium 4.3 04/18/2019 06:07 AM    Potassium 3.8 04/17/2019 11:24 AM    Potassium 3.9 10/25/2017 01:06 PM    Chloride 104 07/17/2019 12:02 PM    Chloride 108 04/19/2019 05:50 AM    Chloride 107 04/18/2019 06:07 AM    Chloride 105 04/17/2019 11:24 AM    Chloride 107 10/25/2017 01:06 PM    CO2 22 07/17/2019 12:02 PM    CO2 26 04/19/2019 05:50 AM    CO2 27 04/18/2019 06:07 AM    CO2 26 04/17/2019 11:24 AM    CO2 26 10/25/2017 01:06 PM    Anion gap 9 04/19/2019 05:50 AM    Anion gap 8 04/18/2019 06:07 AM    Anion gap 11 04/17/2019 11:24 AM    Anion gap 10 10/25/2017 01:06 PM    Glucose 147 (H) 07/17/2019 12:02 PM    Glucose 126 (H) 04/19/2019 05:50 AM    Glucose 127 (H) 04/18/2019 06:07 AM    Glucose 126 (H) 04/17/2019 11:24 AM    Glucose 108 (H) 10/25/2017 01:06 PM    BUN 14 07/17/2019 12:02 PM    BUN 10 04/19/2019 05:50 AM    BUN 13 04/18/2019 06:07 AM    BUN 19 04/17/2019 11:24 AM    BUN 15 10/25/2017 01:06 PM    Creatinine 0.86 07/17/2019 12:02 PM    Creatinine 0.78 04/19/2019 05:50 AM    Creatinine 0.76 04/18/2019 06:07 AM    Creatinine 0.90 04/17/2019 11:24 AM    Creatinine 0.92 10/25/2017 01:06 PM    BUN/Creatinine ratio 16 07/17/2019 12:02 PM    BUN/Creatinine ratio 13 04/19/2019 05:50 AM    BUN/Creatinine ratio 17 04/18/2019 06:07 AM    BUN/Creatinine ratio 21 (H) 04/17/2019 11:24 AM    BUN/Creatinine ratio 16 10/25/2017 01:06 PM    GFR est AA 91 07/17/2019 12:02 PM    GFR est AA >60 04/19/2019 05:50 AM    GFR est AA >60 04/18/2019 06:07 AM    GFR est AA >60 04/17/2019 11:24 AM    GFR est AA >60 10/25/2017 01:06 PM    GFR est non-AA 79 07/17/2019 12:02 PM    GFR est non-AA >60 04/19/2019 05:50 AM    GFR est non-AA >60 04/18/2019 06:07 AM    GFR est non-AA >60 04/17/2019 11:24 AM    GFR est non-AA >60 10/25/2017 01:06 PM    Calcium 9.0 07/17/2019 12:02 PM    Calcium 8.1 (L) 04/19/2019 05:50 AM    Calcium 7.7 (L) 04/18/2019 06:07 AM    Calcium 8.2 (L) 04/17/2019 11:24 AM    Calcium 8.9 10/25/2017 01:06 PM    Bilirubin, total 0.4 07/17/2019 12:02 PM    Bilirubin, total 0.6 04/17/2019 11:24 AM    AST (SGOT) 22 07/17/2019 12:02 PM    AST (SGOT) 19 04/17/2019 11:24 AM    Alk. phosphatase 64 07/17/2019 12:02 PM    Alk.  phosphatase 67 04/17/2019 11:24 AM    Protein, total 7.4 07/17/2019 12:02 PM    Protein, total 7.1 04/17/2019 11:24 AM    Albumin 4.0 07/17/2019 12:02 PM    Albumin 3.4 (L) 04/17/2019 11:24 AM    Globulin 3.7 04/17/2019 11:24 AM    A-G Ratio 1.2 07/17/2019 12:02 PM    A-G Ratio 0.9 (L) 04/17/2019 11:24 AM    ALT (SGPT) 15 07/17/2019 12:02 PM    ALT (SGPT) 24 04/17/2019 11:24 AM     No results found for: CPK, CPKX, CPX  No results found for: CHOL, CHOLX, CHLST, CHOLV, 119583, HDL, HDLP, LDL, LDLC, DLDLP, TGLX, TRIGL, TRIGP, CHHD, CHHDX  No results found for this or any previous visit. Assessment:         Patient Active Problem List    Diagnosis Date Noted    [facility]    PAT (paroxysmal atrial tachycardia) (HCC)     Hypertension     Dyslipidemia     GI bleed 04/17/2019    OA (osteoarthritis) 04/17/2019    GERD (gastroesophageal reflux disease) 04/17/2019    HTN (hypertension) 04/17/2019    Prostate CA ([facility] 04/17/2019      Mild stable RANGEL, some fatigue. Seen by Dr. Hadley Martinez in November--PPM adjusted. Stress MPI 11/12/19 with LVEF 63%, normal perfusion. Echo 11/19/19 with LVEF 65-70, mild AV thickening/no stenosis, otherwise normal. Continues to see PCP. Labs 11/11/19 with normal CBC, CMP, chol 166, , HDL47, TG 74. Plan:     Ongoing fatigue with neg cardiac w/u, PPM adjustment and recent labs (?last thyroid check)  Will STOP the Metoprolol 25 and see if this helps with sx  Recent Stress MPI and Echo ok  PPM followed by Dr. Hadley Martinez with recent check and adjustments. Lipids and labs followed by PCP--?thryoids, ? Fe studies, ? other. Continue current care and f/u in 6 months.     Gerald Layton MD

## 2019-12-18 NOTE — PROGRESS NOTES
Verified patient with two patient identifiers. Medications reviewed/approved by Dr. Leonarda Stahl. Chief Complaint   Patient presents with    Rapid Heart Rate     6 month follow up    Hypertension     1. Have you been to the ER, urgent care clinic since your last visit? Hospitalized since your last visit?no    2. Have you seen or consulted any other health care providers outside of the [address] [place] [address] since your last visit? Include any pap smears or colon screening. Yes, Daffeh, pcp seen since last visit for routine care.

## 2019-12-18 NOTE — LETTER
12/18/19 Patient: You Steinberg YOB: 1932 Date of Visit: 12/18/2019 Cris Bragg MD 
[address] [facility] [id number] VIA Facsimile: [phone] Dear Cris Bragg MD, Thank you for referring Mr. Ishan Obregon to [facility] for evaluation. My notes for this consultation are attached. If you have questions, please do not hesitate to call me. I look forward to following your patient along with you.  
 
 
Sincerely, 
 
Cori Hannah MD

## 2019-12-19 ENCOUNTER — TELEPHONE (OUTPATIENT)
Dept: CARDIOLOGY CLINIC | Age: 84
End: 2019-12-19

## 2019-12-19 ENCOUNTER — OFFICE VISIT (OUTPATIENT)
Dept: CARDIOLOGY CLINIC | Age: 84
End: 2019-12-19

## 2019-12-19 VITALS
WEIGHT: 178 LBS | DIASTOLIC BLOOD PRESSURE: 90 MMHG | SYSTOLIC BLOOD PRESSURE: 142 MMHG | HEIGHT: 66 IN | OXYGEN SATURATION: 98 % | RESPIRATION RATE: 18 BRPM | BODY MASS INDEX: 28.61 KG/M2 | HEART RATE: 77 BPM

## 2019-12-19 DIAGNOSIS — I44.2 CHB (COMPLETE HEART BLOCK) (HCC): ICD-10-CM

## 2019-12-19 DIAGNOSIS — R06.02 SOB (SHORTNESS OF BREATH) ON EXERTION: ICD-10-CM

## 2019-12-19 DIAGNOSIS — E78.5 DYSLIPIDEMIA: ICD-10-CM

## 2019-12-19 DIAGNOSIS — Z95.0 PACEMAKER: ICD-10-CM

## 2019-12-19 DIAGNOSIS — I47.1 PAT (PAROXYSMAL ATRIAL TACHYCARDIA) (HCC): Primary | ICD-10-CM

## 2019-12-19 DIAGNOSIS — I10 ESSENTIAL HYPERTENSION: ICD-10-CM

## 2019-12-19 NOTE — TELEPHONE ENCOUNTER
Pt saw Dr. Sade Wang on 12/19/19 and has a couple of questions. He would liket o speak with Dr. Dick Barnes Please call [phone].

## 2019-12-19 NOTE — PROGRESS NOTES
Verified patient with 2 identifiers   Reviewed record in preparation for visit and obtained necessary documentation. Chief Complaint   Patient presents with    Results     Follow up after echo     Fatigue     patient states metoprolol was d/c by Dr Krishna Gan yesterday     Shortness of Breath     upon exertion      1. Have you been to the ER, urgent care clinic since your last visit? Hospitalized since your last visit? No     2. Have you seen or consulted any other health care providers outside of the [address] [place] [address] since your last visit? Include any pap smears or colon screening.  No

## 2019-12-19 NOTE — PROGRESS NOTES
Subjective:      Ronaldo Aguilera is a 80 y.o. male is here for EP follow up post testing. The patient denies chest pain,orthopnea, PND, LE edema, palpitations, syncope, presyncope or fatigue. Continues to have dyspnea. Dr Leonarda Stahl stopped his bb yesterday due to dyspnea and fatigue. Patient Active Problem List    Diagnosis Date Noted    SSS (sick sinus syndrome) ([facility] [address] 07/19/2019    PAT (paroxysmal atrial tachycardia) (HCC)     Hypertension     Dyslipidemia     GI bleed 04/17/2019    OA (osteoarthritis) 04/17/2019    GERD (gastroesophageal reflux disease) 04/17/2019    HTN (hypertension) 04/17/2019    Prostate CA ([facility] [address] 04/17/2019      Orin Beth MD  Past Medical History:   Diagnosis Date    Arthritis     Dyslipidemia     GERD (gastroesophageal reflux disease)     GI bleed     History of prostate surgery     Hypertension     PAT (paroxysmal atrial tachycardia) (HCC)     Prostate enlargement       Past Surgical History:   Procedure Laterality Date    HX COLONOSCOPY  2004    HX ORTHOPAEDIC      multiple shoulder procedures    HX PROSTATECTOMY      HX UROLOGICAL      [place] INS NEW/RPLCMT PRM PM W/TRANSV ELTRD ATRIAL&VENT N/A 7/19/2019    INSERT PPM DUAL performed by Breanna Schultz MD at [facility] CARDIAC CATH LAB     No Known Allergies   Family History   Problem Relation Age of Onset    No Known Problems Mother     negative for cardiac disease  Social History     Socioeconomic History    Marital status:      Spouse name: Not on file    Number of children: Not on file    Years of education: Not on file    Highest education level: Not on file   Tobacco Use    Smoking status: Former Smoker    Smokeless tobacco: Never Used    Tobacco comment: quit 30 years ago   Substance and Sexual Activity    Alcohol use:  Yes     Alcohol/week: 14.0 standard drinks     Types: 14 Shots of liquor per week     Comment: Per week     Drug use: No   Other Topics Concern     Current Outpatient Medications   Medication Sig    tamsulosin (FLOMAX) 0.4 mg capsule Take 0.4 mg by mouth daily.  vit C/E/Zn/coppr/lutein/zeaxan (PRESERVISION AREDS-2 PO) Take 1 Tab by mouth daily.  lovastatin (MEVACOR) 20 mg tablet Take 20 mg by mouth nightly.  pantoprazole (PROTONIX) 40 mg tablet Take 40 mg by mouth daily. No current facility-administered medications for this visit. Vitals:    12/19/19 1108   BP: 142/90   Pulse: 77   Resp: 18   SpO2: 98%   Weight: 178 lb (80.7 kg)   Height: 5' 6\" (1.676 m)       I have reviewed the nurses notes, vitals, problem list, allergy list, medical history, family, social history and medications. Review of Symptoms:    General: Pt denies excessive weight gain or loss. Pt is able to conduct ADL's  HEENT: Denies blurred vision, headaches, epistaxis and difficulty swallowing. Respiratory: reports shortness of breath, RANGEL  Cardiovascular: Denies precordial pain, palpitations, edema or PND  Gastrointestinal: Denies poor appetite, indigestion, abdominal pain or blood in stool  Urinary: Denies dysuria, pyuria  Musculoskeletal: Denies pain or swelling from muscles or joints  Neurologic: Denies tremor, paresthesias, or sensory motor disturbance  Skin: Denies rash, itching or texture change. Psych: Denies depression    Physical Exam:      General: Well developed, in no acute distress. HEENT: Eyes - PERRL, no jvd  Heart:  Normal S1/S2 negative S3 or S4. Regular, no murmur, gallop or rub. Respiratory: Clear bilaterally x 4, no wheezing or rales  Extremities:  No edema, normal cap refill, no cyanosis. Musculoskeletal: No clubbing  Neuro: A&Ox3, speech clear, gait stable. Skin: Skin color is normal. No rashes or lesions. Non diaphoretic  Vascular: 2+ pulses symmetric in all extremities    Cardiographics    Ekg: Sinus  Rhythm  -First degree A-V block   Yovani = 220      Echo:  · Left Ventricle: Normal cavity size, wall thickness and systolic function (ejection fraction normal). Estimated left ventricular ejection fraction is 66 - 70%. Visually measured ejection fraction. Mild (grade 1) left ventricular diastolic dysfunction. · Aortic Valve: Mild aortic valve focal thickening present. · Pulmonary Artery: There is no evidence of pulmonary hypertension. Nuclear stress:    TECHNIQUE: Resting SPECT images of the heart were obtained following the uneventful intravenous administration of 10.6 mCi of Tc 99m Sestamibi. Gated stress SPECT images of the heart were obtained following Lexiscan protocol and the uneventful intravenous administration of 33.0 mCi of Tc 99m Sestamibi. FINDINGS: The rest and stress perfusion images demonstrate no significant perfusion defect or evidence of myocardial reversibility. The gated images demonstrate normal global and regional wall motion and thickening. Left ventricular ejection fraction is 63 %. Impression:   Normal gated rest/stress myocardial perfusion imaging study. No evidence of myocardial ischemia or infarction. Left ventricular ejection fraction is 63 %.       Results for orders placed or performed during the hospital encounter of 04/17/19   EKG, 12 LEAD, INITIAL   Result Value Ref Range    Ventricular Rate 75 BPM    Atrial Rate 75 BPM    P-R Interval 238 ms    QRS Duration 102 ms    Q-T Interval 394 ms    QTC Calculation (Bezet) 439 ms    Calculated P Axis 57 degrees    Calculated R Axis -28 degrees    Calculated T Axis -13 degrees    Diagnosis       Sinus rhythm with 1st degree AV block  Moderate voltage criteria for LVH, may be normal variant  Borderline ECG  When compared with ECG of 25-OCT-2017 12:40,  No significant change was found  Confirmed by Keira Rodriguez MD, --- ([id number]) on 4/18/2019 7:57:30 PM           Lab Results   Component Value Date/Time    WBC 6.8 07/17/2019 12:02 PM    HGB 13.9 07/17/2019 12:02 PM    HCT 42.7 07/17/2019 12:02 PM    PLATELET 993 43/78/1220 12:02 PM    MCV 91 07/17/2019 12:02 PM      Lab Results Component Value Date/Time    Sodium 141 07/17/2019 12:02 PM    Potassium 4.2 07/17/2019 12:02 PM    Chloride 104 07/17/2019 12:02 PM    CO2 22 07/17/2019 12:02 PM    Anion gap 9 04/19/2019 05:50 AM    Glucose 147 (H) 07/17/2019 12:02 PM    BUN 14 07/17/2019 12:02 PM    Creatinine 0.86 07/17/2019 12:02 PM    BUN/Creatinine ratio 16 07/17/2019 12:02 PM    GFR est AA 91 07/17/2019 12:02 PM    GFR est non-AA 79 07/17/2019 12:02 PM    Calcium 9.0 07/17/2019 12:02 PM    Bilirubin, total 0.4 07/17/2019 12:02 PM    AST (SGOT) 22 07/17/2019 12:02 PM    Alk. phosphatase 64 07/17/2019 12:02 PM    Protein, total 7.4 07/17/2019 12:02 PM    Albumin 4.0 07/17/2019 12:02 PM    Globulin 3.7 04/17/2019 11:24 AM    A-G Ratio 1.2 07/17/2019 12:02 PM    ALT (SGPT) 15 07/17/2019 12:02 PM      No results found for: TSH, TSH2, TSH3, TSHP, TSHEXT, TSHEXT     Assessment:             ICD-10-CM ICD-9-CM    1. PAT (paroxysmal atrial tachycardia) ([facility]) I47.1 427.0 REFERRAL TO PULMONARY DISEASE   2. CHB (complete heart block) ([facility]) I44.2 426.0 REFERRAL TO PULMONARY DISEASE   3. Essential hypertension I10 401.9 AMB POC EKG ROUTINE W/ 12 LEADS, INTER & REP      REFERRAL TO PULMONARY DISEASE   4. SOB (shortness of breath) on exertion R06.02 786.05 REFERRAL TO PULMONARY DISEASE   5. Pacemaker Z95.0 V45.01 REFERRAL TO PULMONARY DISEASE   6. Dyslipidemia E78.5 272.4      Orders Placed This Encounter   [facility] [address]     Referral Priority:   Routine     Referral Type:   Consultation     Referral Reason:   Specialty Services Required     Referral Location:   [facility]     Referred to Provider:   Rogena Eisenmenger, MD     Number of Visits Requested:   1    AMB POC EKG ROUTINE W/ 12 LEADS, INTER & REP     Order Specific Question:   Reason for Exam:     Answer:   routine        Plan:     Mr Al Jha is having sob/fatigue. His stress test is negative for ischemia, echo with normal EF.  He is a former smoker and retired coal .  Will refer to pulmonary. We will see how he does off bb per Dr Jaime Finley. If no better with recurrent atrial arhythmia, will discuss ablation. Monitor PM remotely and follow up in 1 year. Thank you for allowing me to participate in Jerrica Veloz 's care. Ania Pitt NP    Patient seen and examined. All pertinent data reviewed. I have reviewed detailed note as outlined by Ania Pitt NP. Case discussed with Nursing/medical assistant staff and Ania Pitt NP. Plans as outlined. He is V paced 68%. His lvef is wnl and his stress was wnl. He has bursts of PAT v ST. His fatigue/sob is possibly secondary to pulm issues - will refer to pulm. Cont med rx for htn and hyperlipidemia. F/u in one year.     Raymundo Reynolds MD, [facility]

## 2020-02-08 PROBLEM — T18.108A FOREIGN BODY IN ESOPHAGUS: Status: ACTIVE | Noted: 2020-02-08

## 2020-02-10 ENCOUNTER — OFFICE VISIT (OUTPATIENT)
Dept: CARDIOLOGY CLINIC | Age: 85
End: 2020-02-10

## 2020-02-10 DIAGNOSIS — I44.2 CHB (COMPLETE HEART BLOCK) (HCC): ICD-10-CM

## 2020-02-10 DIAGNOSIS — Z95.0 CARDIAC PACEMAKER IN SITU: Primary | ICD-10-CM

## 2020-05-11 ENCOUNTER — OFFICE VISIT (OUTPATIENT)
Dept: CARDIOLOGY CLINIC | Age: 85
End: 2020-05-11

## 2020-05-11 DIAGNOSIS — I44.2 CHB (COMPLETE HEART BLOCK) (HCC): ICD-10-CM

## 2020-05-11 DIAGNOSIS — Z95.0 CARDIAC PACEMAKER IN SITU: Primary | ICD-10-CM

## 2020-05-12 ENCOUNTER — VIRTUAL VISIT (OUTPATIENT)
Dept: CARDIOLOGY CLINIC | Age: 85
End: 2020-05-12

## 2020-05-12 VITALS
SYSTOLIC BLOOD PRESSURE: 155 MMHG | DIASTOLIC BLOOD PRESSURE: 87 MMHG | HEART RATE: 72 BPM | WEIGHT: 162 LBS | HEIGHT: 66 IN | BODY MASS INDEX: 26.03 KG/M2

## 2020-05-12 DIAGNOSIS — I49.5 SSS (SICK SINUS SYNDROME) (HCC): ICD-10-CM

## 2020-05-12 DIAGNOSIS — I47.1 PAT (PAROXYSMAL ATRIAL TACHYCARDIA) (HCC): ICD-10-CM

## 2020-05-12 DIAGNOSIS — I10 ESSENTIAL HYPERTENSION: Primary | ICD-10-CM

## 2020-05-12 DIAGNOSIS — E78.5 DYSLIPIDEMIA: ICD-10-CM

## 2020-05-12 NOTE — PROGRESS NOTES
Waldemar Feliz MD    Cardiology Telephone Encounter                                                         Pursuant to the emergency declaration under the [address] [facility] [address] waiver authority and the Coronavirus Preparedness and [employer] Act, this phone visit was conducted, with patient's consent, to reduce the patient's risk of exposure to COVID-19 and provide continuity of care for an established patient. He and/or health care decision maker is aware that that he may receive a bill for this telephone service, depending on his insurance coverage, and has provided verbal consent to proceed. This is a Patient Initiated Episode with an Established Patient who has not had a related appointment within my department in the past 7 days or scheduled within the next 24 hours. HISTORY OF PRESENTING ILLNESS      Darron Bunn is a 80 y.o. male evaluated via telephone on 5/12/2020 due to COVID 19 restrictions. Patient unable to participate in Virtual Visit with synchronous audio/visual technology. Mr Rob Daugherty is doing much better than the last time we met. He feels that he may have expressed fatigue due to the loss of his wife. Since stopping his bb, he feels better. Energy has returned.     PCP Provider  Orin Russ MD  Past Medical History:   Diagnosis Date    Arthritis     Dyslipidemia     GERD (gastroesophageal reflux disease)     GI bleed     History of prostate surgery     Hypertension     PAT (paroxysmal atrial tachycardia) (HCC)     Prostate enlargement       Past Surgical History:   Procedure Laterality Date    HX COLONOSCOPY  2004    HX ORTHOPAEDIC      multiple shoulder procedures    HX PROSTATECTOMY      HX UROLOGICAL      [place] INS NEW/RPLCMT PRM PM W/TRANSV ELTRD ATRIAL&VENT N/A 7/19/2019    INSERT PPM DUAL performed by Jonathan Sparks MD at [facility] CARDIAC CATH LAB     No Known Allergies   Family History   Problem Relation Age of Onset    No Known Problems Mother       Current Outpatient Medications   Medication Sig    tamsulosin (FLOMAX) 0.4 mg capsule Take 0.4 mg by mouth daily.  vit C/E/Zn/coppr/lutein/zeaxan (PRESERVISION AREDS-2 PO) Take 1 Tab by mouth daily.  lovastatin (MEVACOR) 20 mg tablet Take 20 mg by mouth nightly.  pantoprazole (PROTONIX) 40 mg tablet Take 40 mg by mouth daily.  cyanocobalamin (VITAMIN B12) 1,000 mcg/mL injection 1,000 mcg by IntraMUSCular route every seven (7) days. No current facility-administered medications for this visit. Vitals:    05/12/20 1441   BP: (!) 172/93   Pulse: 80   Weight: 162 lb (73.5 kg)   Height: 5' 6\" (1.676 m)     Social History     Socioeconomic History    Marital status:      Spouse name: Not on file    Number of children: Not on file    Years of education: Not on file    Highest education level: Not on file   Occupational History    Not on file   Social Needs    Financial resource strain: Not on file    Food insecurity     Worry: Not on file     Inability: Not on file    Transportation needs     Medical: Not on file     Non-medical: Not on file   Tobacco Use    Smoking status: Former Smoker    Smokeless tobacco: Never Used    Tobacco comment: quit 30 years ago   Substance and Sexual Activity    Alcohol use:  Yes     Alcohol/week: 14.0 standard drinks     Types: 14 Shots of liquor per week     Comment: Per week     Drug use: No    Sexual activity: Not on file   Lifestyle    Physical activity     Days per week: Not on file     Minutes per session: Not on file    Stress: Not on file   Relationships    Social connections     Talks on phone: Not on file     Gets together: Not on file     Attends Congregation service: Not on file     Active member of club or organization: Not on file     Attends meetings of clubs or organizations: Not on file     Relationship status: Not on file    Intimate partner violence     Fear of current or ex partner: Not on file     Emotionally abused: Not on file     Physically abused: Not on file     Forced sexual activity: Not on file   Other Topics Concern     Service Not Asked    Blood Transfusions Not Asked    Caffeine Concern Not Asked    Occupational Exposure Not Asked    Hobby Hazards Not Asked    Sleep Concern Not Asked    Stress Concern Not Asked    Weight Concern Not Asked    Special Diet Not Asked    Back Care Not Asked    Exercise Not Asked    Bike Helmet Not Asked   [address] Not Asked    Self-Exams Not Asked   Social History Narrative    Not on file       MEDICATIONS     Current Outpatient Medications   Medication Sig    tamsulosin (FLOMAX) 0.4 mg capsule Take 0.4 mg by mouth daily.  vit C/E/Zn/coppr/lutein/zeaxan (PRESERVISION AREDS-2 PO) Take 1 Tab by mouth daily.  lovastatin (MEVACOR) 20 mg tablet Take 20 mg by mouth nightly.  pantoprazole (PROTONIX) 40 mg tablet Take 40 mg by mouth daily.  cyanocobalamin (VITAMIN B12) 1,000 mcg/mL injection 1,000 mcg by IntraMUSCular route every seven (7) days. No current facility-administered medications for this visit. I have reviewed the nurses notes, vitals, problem list, allergy list, medical history, family, social history and medications. REVIEW OF SYMPTOMS     General: Pt denies excessive weight gain or loss. Pt is able to conduct ADL's  HEENT: Denies blurred vision, headaches, hearing loss, epistaxis and difficulty swallowing. Respiratory: Denies cough, congestion, shortness of breath, RANGEL, wheezing or stridor.   Cardiovascular: Denies precordial pain, palpitations, edema or PND  Gastrointestinal: Denies poor appetite, indigestion, abdominal pain or blood in stool  Genitourinary: Denies hematuria, dysuria, increased urinary frequency  Musculoskeletal: Denies joint pain or swelling from muscles or joints  Neurologic: Denies tremor, paresthesias, headache, or sensory motor disturbance  Psychiatric: Denies confusion, insomnia, depression  Integumentray: Denies rash, itching or ulcers. Hematologic: Denies easy bruising, bleeding       PHYSICAL EXAM       Due to this being a telephone encounter a very limited exam was performed  Neurological: A&Ox3, no slurred speech, answering questions appropriately  Respiratory: Non labored, talking in complete sentences, no audible wheeze over the phone        ASSESSMENT        Diagnoses and all orders for this visit:    1. Essential hypertension  -     CBC WITH AUTOMATED DIFF  -     PROTHROMBIN TIME + INR  -     METABOLIC PANEL, COMPREHENSIVE  -     XR CHEST PA LAT; Future    2. Dyslipidemia  -     CBC WITH AUTOMATED DIFF  -     PROTHROMBIN TIME + INR  -     METABOLIC PANEL, COMPREHENSIVE  -     XR CHEST PA LAT; Future    3. PAT (paroxysmal atrial tachycardia) ([facility])  -     CBC WITH AUTOMATED DIFF  -     PROTHROMBIN TIME + INR  -     METABOLIC PANEL, COMPREHENSIVE  -     XR CHEST PA LAT; Future    4. SSS (sick sinus syndrome) ([facility])  -     CBC WITH AUTOMATED DIFF  -     PROTHROMBIN TIME + INR  -     METABOLIC PANEL, COMPREHENSIVE  -     XR CHEST PA LAT; Future        ICD-10-CM ICD-9-CM    1. Essential hypertension I10 401.9 CBC WITH AUTOMATED DIFF      PROTHROMBIN TIME + INR      METABOLIC PANEL, COMPREHENSIVE      XR CHEST PA LAT   2. Dyslipidemia E78.5 272.4 CBC WITH AUTOMATED DIFF      PROTHROMBIN TIME + INR      METABOLIC PANEL, COMPREHENSIVE      XR CHEST PA LAT   3. PAT (paroxysmal atrial tachycardia) ([facility]) I47.1 427.0 CBC WITH AUTOMATED DIFF      PROTHROMBIN TIME + INR      METABOLIC PANEL, COMPREHENSIVE      XR CHEST PA LAT   4.  SSS (sick sinus syndrome) ([facility]) I49.5 427.81 CBC WITH AUTOMATED DIFF      PROTHROMBIN TIME + INR      METABOLIC PANEL, COMPREHENSIVE      XR CHEST PA LAT     Orders Placed This Encounter    XR CHEST PA LAT     Standing Status: Future     Standing Expiration Date:   6/12/2020     Order Specific Question:   Reason for Exam     Answer:   pre op    CBC WITH AUTOMATED DIFF    PROTHROMBIN TIME + INR    METABOLIC PANEL, COMPREHENSIVE        PLAN   Mr Jadon Rodriguez is having PAT on his device but is actually feeling better since we last saw him. He denies palpitations/sob. He is A paced 10% for sick sinus and V paced 15% for high grade av block. Cont med rx for htn and hyperlipidemia. TIME (25 Minutes) SPENT RELATED TO THIS PHONE ENCOUNTER    We discussed the expected course, resolution and complications of the diagnosis(es) in detail. Medication risks, benefits, costs, interactions, and alternatives were discussed as indicated. I advised him to contact the office if his condition worsens, changes or fails to improve as anticipated. He expressed understanding with the diagnosis(es) and plan     FOLLOW-UP   Kate Brownlee will follow up with me in six months and in the device clinic per routine. Patient was made aware and verbalized understanding that an appointment will be scheduled for them for a virtual visit and/or office visit within the above time frame. Patient understanding his/her responsibility to call and change time/date if he/she so chooses. Thank you, Lakshmi Jones MD for allowing me to participate in the care of this extraordinarily pleasant male. Please do not hesitate to contact me for further questions/concerns.        Saint Good, MD

## 2020-06-24 ENCOUNTER — OFFICE VISIT (OUTPATIENT)
Dept: CARDIOLOGY CLINIC | Age: 85
End: 2020-06-24

## 2020-06-24 VITALS
TEMPERATURE: 97.4 F | HEART RATE: 90 BPM | HEIGHT: 66 IN | WEIGHT: 169 LBS | DIASTOLIC BLOOD PRESSURE: 80 MMHG | SYSTOLIC BLOOD PRESSURE: 120 MMHG | RESPIRATION RATE: 16 BRPM | OXYGEN SATURATION: 95 % | BODY MASS INDEX: 27.16 KG/M2

## 2020-06-24 DIAGNOSIS — I49.5 SSS (SICK SINUS SYNDROME) (HCC): ICD-10-CM

## 2020-06-24 DIAGNOSIS — I44.2 CHB (COMPLETE HEART BLOCK) (HCC): ICD-10-CM

## 2020-06-24 DIAGNOSIS — I10 ESSENTIAL HYPERTENSION: ICD-10-CM

## 2020-06-24 DIAGNOSIS — R53.83 FATIGUE, UNSPECIFIED TYPE: ICD-10-CM

## 2020-06-24 DIAGNOSIS — Z95.0 PACEMAKER: ICD-10-CM

## 2020-06-24 DIAGNOSIS — I47.1 PAT (PAROXYSMAL ATRIAL TACHYCARDIA) (HCC): Primary | ICD-10-CM

## 2020-06-24 NOTE — PROGRESS NOTES
Gabriele Barkley is a 80 y.o. male is here for routine f/u. No specific CV sx or complaints, other than ongoing fatigue. Low B12 and receiving shots. PPM followed by Dr. Dianna Bal.  Recent labs per Dr. Warren Myles (?TFT's as part of this). Not as active as previously (COVID related) but still tries to walk, etc.  ?rate responsiveness of PPM previously changed. The patient denies chest pain/ shortness of breath, orthopnea, PND, LE edema, palpitations, syncope, presyncope.        Patient Active Problem List    Diagnosis Date Noted    Foreign body in esophagus 02/08/2020    SSS (sick sinus syndrome) ([facility] 07/19/2019    PAT (paroxysmal atrial tachycardia) ([facility]     Hypertension     Dyslipidemia     GI bleed 04/17/2019    OA (osteoarthritis) 04/17/2019    GERD (gastroesophageal reflux disease) 04/17/2019    HTN (hypertension) 04/17/2019    Prostate CA ([facility] 04/17/2019      Orin Beth MD  Past Medical History:   Diagnosis Date    Arthritis     Dyslipidemia     GERD (gastroesophageal reflux disease)     GI bleed     History of prostate surgery     Hypertension     PAT (paroxysmal atrial tachycardia) (HCC)     Prostate enlargement       Past Surgical History:   Procedure Laterality Date    HX COLONOSCOPY  2004    HX ORTHOPAEDIC      multiple shoulder procedures    HX PROSTATECTOMY      HX UROLOGICAL      [place] INS NEW/RPLCMT PRM PM W/TRANSV ELTRD ATRIAL&VENT N/A 7/19/2019    INSERT PPM DUAL performed by Reyes Ross MD at [facility] CARDIAC CATH LAB     No Known Allergies   Family History   Problem Relation Age of Onset    No Known Problems Mother       Social History     Socioeconomic History    Marital status:      Spouse name: Not on file    Number of children: Not on file    Years of education: Not on file    Highest education level: Not on file   Occupational History    Not on file   Social Needs    Financial resource strain: Not on file    Food insecurity     Worry: Not on file     Inability: Not on file    Transportation needs     Medical: Not on file     Non-medical: Not on file   Tobacco Use    Smoking status: Former Smoker    Smokeless tobacco: Never Used    Tobacco comment: quit 30 years ago   Substance and Sexual Activity    Alcohol use: Yes     Alcohol/week: 14.0 standard drinks     Types: 14 Shots of liquor per week     Comment: Per week     Drug use: No    Sexual activity: Not on file   Lifestyle    Physical activity     Days per week: Not on file     Minutes per session: Not on file    Stress: Not on file   Relationships    Social connections     Talks on phone: Not on file     Gets together: Not on file     Attends [employer] service: Not on file     Active member of club or organization: Not on file     Attends meetings of clubs or organizations: Not on file     Relationship status: Not on file    Intimate partner violence     Fear of current or ex partner: Not on file     Emotionally abused: Not on file     Physically abused: Not on file     Forced sexual activity: Not on file   Other Topics Concern     Service Not Asked    Blood Transfusions Not Asked    Caffeine Concern Not Asked    Occupational Exposure Not Asked   Dewanda Jerilyn Hazards Not Asked    Sleep Concern Not Asked    Stress Concern Not Asked    Weight Concern Not Asked    Special Diet Not Asked    Back Care Not Asked    Exercise Not Asked    Bike Helmet Not Asked    Seat Belt Not Asked    Self-Exams Not Asked   Social History Narrative    Not on file      Current Outpatient Medications   Medication Sig    cyanocobalamin (VITAMIN B12) 1,000 mcg/mL injection 1,000 mcg by IntraMUSCular route every month.  tamsulosin (FLOMAX) 0.4 mg capsule Take 0.4 mg by mouth daily.  vit C/E/Zn/coppr/lutein/zeaxan (PRESERVISION AREDS-2 PO) Take 1 Tab by mouth daily.  lovastatin (MEVACOR) 20 mg tablet Take 20 mg by mouth nightly.     pantoprazole (PROTONIX) 40 mg tablet Take 40 mg by mouth daily. No current facility-administered medications for this visit. Review of Symptoms:    CONST  No weight change. No fever, chills, sweats    ENT No visual changes, URI sx, sore throat    CV  See HPI   RESP  No cough, or sputum, wheezing. Also see HPI   GI  No abdominal pain or change in bowel habits. No heartburn or dysphagia. No melena or rectal bleeding.   No dysuria, urgency, frequency, hematuria   MSKEL  No joint pain, swelling. No muscle pain. SKIN  No rash or lesions. NEURO  No headache, syncope, or seizure. No weakness, loss of sensation, or paresthesias. PSYCH  No low mood or depression  No anxiety. HE/LYMPH  No easy bruising, abnormal bleeding, or enlarged glands. Physical ExamPhysical Exam:    Visit Vitals  /80 (BP 1 Location: Left arm, BP Patient Position: Sitting)   Pulse 90   Temp 97.4 °F (36.3 °C) (Temporal)   Resp 16   Ht 5' 6\" (1.676 m)   Wt 169 lb (76.7 kg)   SpO2 95% Comment: ra   BMI 27.28 kg/m²     Gen: NAD  HEENT:  PERRL, throat clear  Neck: no adenopathy, no thyromegaly, no JVD   Heart:  Regular,Nl S1S2,  no murmur, gallop or rub. Lungs:  clear  Abdomen:   Soft, non-tender, bowel sounds are active.    Extremities:  No edema  Pulse: symmetric  Neuro: A&O times 3, No focal neuro deficits    Cardiographics    ECG: NSR, first degree AV block    Labs:   Lab Results   Component Value Date/Time    Sodium 141 02/08/2020 08:45 PM    Sodium 141 07/17/2019 12:02 PM    Sodium 143 04/19/2019 05:50 AM    Sodium 142 04/18/2019 06:07 AM    Sodium 142 04/17/2019 11:24 AM    Potassium 4.3 02/08/2020 08:45 PM    Potassium 4.2 07/17/2019 12:02 PM    Potassium 4.0 04/19/2019 05:50 AM    Potassium 4.3 04/18/2019 06:07 AM    Potassium 3.8 04/17/2019 11:24 AM    Chloride 102 02/08/2020 08:45 PM    Chloride 104 07/17/2019 12:02 PM    Chloride 108 04/19/2019 05:50 AM    Chloride 107 04/18/2019 06:07 AM    Chloride 105 04/17/2019 11:24 AM    CO2 27 02/08/2020 08:45 PM CO2 22 07/17/2019 12:02 PM    CO2 26 04/19/2019 05:50 AM    CO2 27 04/18/2019 06:07 AM    CO2 26 04/17/2019 11:24 AM    Anion gap 12 02/08/2020 08:45 PM    Anion gap 9 04/19/2019 05:50 AM    Anion gap 8 04/18/2019 06:07 AM    Anion gap 11 04/17/2019 11:24 AM    Anion gap 10 10/25/2017 01:06 PM    Glucose 132 (H) 02/08/2020 08:45 PM    Glucose 147 (H) 07/17/2019 12:02 PM    Glucose 126 (H) 04/19/2019 05:50 AM    Glucose 127 (H) 04/18/2019 06:07 AM    Glucose 126 (H) 04/17/2019 11:24 AM    BUN 22 (H) 02/08/2020 08:45 PM    BUN 14 07/17/2019 12:02 PM    BUN 10 04/19/2019 05:50 AM    BUN 13 04/18/2019 06:07 AM    BUN 19 04/17/2019 11:24 AM    Creatinine 1.19 02/08/2020 08:45 PM    Creatinine 0.86 07/17/2019 12:02 PM    Creatinine 0.78 04/19/2019 05:50 AM    Creatinine 0.76 04/18/2019 06:07 AM    Creatinine 0.90 04/17/2019 11:24 AM    BUN/Creatinine ratio 18 02/08/2020 08:45 PM    BUN/Creatinine ratio 16 07/17/2019 12:02 PM    BUN/Creatinine ratio 13 04/19/2019 05:50 AM    BUN/Creatinine ratio 17 04/18/2019 06:07 AM    BUN/Creatinine ratio 21 (H) 04/17/2019 11:24 AM    GFR est AA >60 02/08/2020 08:45 PM    GFR est AA 91 07/17/2019 12:02 PM    GFR est AA >60 04/19/2019 05:50 AM    GFR est AA >60 04/18/2019 06:07 AM    GFR est AA >60 04/17/2019 11:24 AM    GFR est non-AA 58 (L) 02/08/2020 08:45 PM    GFR est non-AA 79 07/17/2019 12:02 PM    GFR est non-AA >60 04/19/2019 05:50 AM    GFR est non-AA >60 04/18/2019 06:07 AM    GFR est non-AA >60 04/17/2019 11:24 AM    Calcium 9.3 02/08/2020 08:45 PM    Calcium 9.0 07/17/2019 12:02 PM    Calcium 8.1 (L) 04/19/2019 05:50 AM    Calcium 7.7 (L) 04/18/2019 06:07 AM    Calcium 8.2 (L) 04/17/2019 11:24 AM    Bilirubin, total 0.4 02/08/2020 08:45 PM    Bilirubin, total 0.4 07/17/2019 12:02 PM    Bilirubin, total 0.6 04/17/2019 11:24 AM    Alk. phosphatase 74 02/08/2020 08:45 PM    Alk. phosphatase 64 07/17/2019 12:02 PM    Alk.  phosphatase 67 04/17/2019 11:24 AM    Protein, total 8.5 (H) 02/08/2020 08:45 PM    Protein, total 7.4 07/17/2019 12:02 PM    Protein, total 7.1 04/17/2019 11:24 AM    Albumin 4.0 02/08/2020 08:45 PM    Albumin 4.0 07/17/2019 12:02 PM    Albumin 3.4 (L) 04/17/2019 11:24 AM    Globulin 4.5 (H) 02/08/2020 08:45 PM    Globulin 3.7 04/17/2019 11:24 AM    A-G Ratio 0.9 (L) 02/08/2020 08:45 PM    A-G Ratio 1.2 07/17/2019 12:02 PM    A-G Ratio 0.9 (L) 04/17/2019 11:24 AM    ALT (SGPT) 26 02/08/2020 08:45 PM    ALT (SGPT) 15 07/17/2019 12:02 PM    ALT (SGPT) 24 04/17/2019 11:24 AM     No results found for: CPK, CPKX, CPX  No results found for: CHOL, CHOLX, CHLST, CHOLV, 487230, HDL, HDLP, LDL, LDLC, DLDLP, TGLX, TRIGL, TRIGP, CHHD, CHHDX  No results found for this or any previous visit. Assessment:         Patient Active Problem List    Diagnosis Date Noted    Foreign body in esophagus 02/08/2020    SSS (sick [facility] syndrome) ([facility] 07/19/2019    PAT (paroxysmal atrial tachycardia) ([facility]     Hypertension     Dyslipidemia     GI bleed 04/17/2019    OA (osteoarthritis) 04/17/2019    GERD (gastroesophageal reflux disease) 04/17/2019    HTN (hypertension) 04/17/2019    Prostate CA ([facility] 04/17/2019      No specific CV sx or complaints, other than ongoing fatigue. Low B12 and receiving shots. PPM followed by Dr. Ralph Flynn.  Recent labs per Dr. Tasneem Acuña (?TFT's as part of this). Not as active as previously (COVID related) but still tries to walk, etc.  ?rate responsiveness of PPM previously changed. Plan:     Doing well with no adverse cardiac symptoms other than fatigue. PPM checks per Dr Ralph Flynn.   Lipids and labs followed by PCP. Continue current care and f/u in 6 months.     Alla Altamirano MD

## 2020-06-24 NOTE — PROGRESS NOTES
Verified patient with two patient identifiers. Medications reviewed/approved by Dr. Mello Mesa. 1. Have you been to the ER, urgent care clinic since your last visit? Hospitalized since your last visit?no    2. Have you seen or consulted any other health care providers outside of the [address] [place] [address] since your last visit? Include any pap smears or colon screening. Yes, Dr. Jaime Dorado seen since last visit for routine care.

## 2020-06-24 NOTE — LETTER
6/24/20 Patient: Cat Cord YOB: 1932 Date of Visit: 6/24/2020 Carlos García MD 
[address] [facility] [address] [id number] VIA Facsimile: [phone] Dear Carlos García MD, Thank you for referring Mr. Elba Patel to [facility] for evaluation. My notes for this consultation are attached. If you have questions, please do not hesitate to call me. I look forward to following your patient along with you.  
 
 
Sincerely, 
 
Lovetta Shone, MD

## 2020-06-29 RX ORDER — METOPROLOL SUCCINATE 25 MG/1
12.5 TABLET, EXTENDED RELEASE ORAL DAILY
Qty: 45 TAB | Refills: 0 | Status: SHIPPED | OUTPATIENT
Start: 2020-06-29 | End: 2020-07-30

## 2020-07-30 ENCOUNTER — OFFICE VISIT (OUTPATIENT)
Dept: CARDIOLOGY CLINIC | Age: 85
End: 2020-07-30

## 2020-07-30 ENCOUNTER — CLINICAL SUPPORT (OUTPATIENT)
Dept: CARDIOLOGY CLINIC | Age: 85
End: 2020-07-30

## 2020-07-30 VITALS
DIASTOLIC BLOOD PRESSURE: 82 MMHG | HEART RATE: 73 BPM | HEIGHT: 66 IN | WEIGHT: 168.9 LBS | BODY MASS INDEX: 27.14 KG/M2 | RESPIRATION RATE: 16 BRPM | OXYGEN SATURATION: 94 % | SYSTOLIC BLOOD PRESSURE: 136 MMHG

## 2020-07-30 DIAGNOSIS — I47.1 PAT (PAROXYSMAL ATRIAL TACHYCARDIA) (HCC): ICD-10-CM

## 2020-07-30 DIAGNOSIS — I49.5 SSS (SICK SINUS SYNDROME) (HCC): Primary | ICD-10-CM

## 2020-07-30 DIAGNOSIS — Z95.0 CARDIAC PACEMAKER IN SITU: Primary | ICD-10-CM

## 2020-07-30 DIAGNOSIS — I10 ESSENTIAL HYPERTENSION: ICD-10-CM

## 2020-07-30 DIAGNOSIS — I44.2 CHB (COMPLETE HEART BLOCK) (HCC): ICD-10-CM

## 2020-07-30 DIAGNOSIS — E78.2 MIXED HYPERLIPIDEMIA: ICD-10-CM

## 2020-07-30 DIAGNOSIS — E78.5 DYSLIPIDEMIA: ICD-10-CM

## 2020-07-30 RX ORDER — HYDROCODONE BITARTRATE AND ACETAMINOPHEN 5; 325 MG/1; MG/1
TABLET ORAL
COMMUNITY
Start: 2020-06-23 | End: 2021-02-23

## 2020-07-30 NOTE — PROGRESS NOTES
1. Have you been to the ER, urgent care clinic since your last visit? Hospitalized since your last visit? No    2. Have you seen or consulted any other health care providers outside of the [address] [place] [address] since your last visit? Include any pap smears or colon screening.  No    Chief Complaint   Patient presents with    Irregular Heart Beat     follow up    Pacemaker Check     device check    Fatigue     x 1 year

## 2020-07-30 NOTE — LETTER
7/30/20 Patient: Dalia Aparicio YOB: 1932 Date of Visit: 7/30/2020 Robert Ruggiero MD 
[address] [place] [id number] VIA Facsimile: [phone] Dear Robert Ruggiero MD, Thank you for referring Mr. Nata Mas to [address] [place] [address] [facility] for evaluation. My notes for this consultation are attached. If you have questions, please do not hesitate to call me. I look forward to following your patient along with you. Sincerely, Rodolfo Hendrickson MD

## 2020-07-30 NOTE — PROGRESS NOTES
Subjective:      Mitch Coats is a 80 y.o. male is here for EP consult. He was seen by Dr Krishna Gan and was having increased sob with exertion. He denies palpitations. Patient Active Problem List    Diagnosis Date Noted    Foreign body in esophagus 02/08/2020    [facility]SS (sick [facility] syndrome) ([facility] 07/19/2019    [facility]     Hypertension     Dyslipidemia     GI bleed 04/17/2019    OA (osteoarthritis) 04/17/2019    GERD (gastroesophageal reflux disease) 04/17/2019    HTN (hypertension) 04/17/2019    Prostate CA ([facility] 04/17/2019      Orin Beth MD  Past Medical History:   Diagnosis Date    Arthritis     Dyslipidemia     GERD (gastroesophageal reflux disease)     GI bleed     History of prostate surgery     Hypertension     PAT (paroxysmal atrial tachycardia) (HCC)     Prostate enlargement       Past Surgical History:   Procedure Laterality Date    HX COLONOSCOPY  2004    HX ORTHOPAEDIC      multiple shoulder procedures    HX PROSTATECTOMY      HX UROLOGICAL      [place] INS NEW/RPLCMT PRM PM W/TRANSV ELTRD ATRIAL&VENT N/A 7/19/2019    INSERT PPM DUAL performed by Amanda Blakely MD at [facility] CARDIAC CATH LAB     No Known Allergies   Family History   Problem Relation Age of Onset    No Known Problems Mother     negative for cardiac disease  Social History     Socioeconomic History    Marital status:      Spouse name: Not on file    Number of children: Not on file    Years of education: Not on file    Highest education level: Not on file   Tobacco Use    Smoking status: Former Smoker    Smokeless tobacco: Never Used    Tobacco comment: quit 30 years ago   Substance and Sexual Activity    Alcohol use:  Yes     Alcohol/week: 14.0 standard drinks     Types: 14 Shots of liquor per week     Comment: Per week     Drug use: No   Other Topics Concern     Current Outpatient Medications   Medication Sig    metoprolol succinate (TOPROL-XL) 25 mg XL tablet Take 0.5 Tabs by mouth daily. (Patient taking differently: Take 25 mg by mouth daily.)    cyanocobalamin (VITAMIN B12) 1,000 mcg/mL injection 1,000 mcg by IntraMUSCular route every month.  tamsulosin (FLOMAX) 0.4 mg capsule Take 0.4 mg by mouth daily.  vit C/E/Zn/coppr/lutein/zeaxan (PRESERVISION AREDS-2 PO) Take 1 Tab by mouth daily.  lovastatin (MEVACOR) 20 mg tablet Take 20 mg by mouth nightly.  pantoprazole (PROTONIX) 40 mg tablet Take 40 mg by mouth daily.  HYDROcodone-acetaminophen (NORCO) 5-325 mg per tablet TAKE 1 TABLET BY MOUTH EVERY 4 HOURS AS NEEDED     No current facility-administered medications for this visit. Vitals:    07/30/20 1057   BP: 136/82   Pulse: 73   Resp: 16   SpO2: 94%   Weight: 168 lb 14.4 oz (76.6 kg)   Height: 5' 6\" (1.676 m)       I have reviewed the nurses notes, vitals, problem list, allergy list, medical history, family, social history and medications. Review of Symptoms:    General: Pt denies excessive weight gain or loss. Pt is able to conduct ADL's  HEENT: Denies blurred vision, headaches, hearing loss, epistaxis and difficulty swallowing. Respiratory:+shortness of breath, RANGEL, denies wheezing or stridor. Cardiovascular: Denies precordial pain, palpitations, edema or PND  Gastrointestinal: Denies poor appetite, indigestion, abdominal pain or blood in stool  Genitourinary: Denies hematuria, dysuria, increased urinary frequency  Musculoskeletal: Denies joint pain or swelling from muscles or joints  Neurologic: Denies tremor, paresthesias, headache, or sensory motor disturbance  Psychiatric: Denies confusion, insomnia, depression  Integumentray: Denies rash, itching or ulcers. Hematologic: Denies easy bruising, bleeding    Physical Exam:      General: Well developed, in no acute distress. HEENT: Eyes - PERRL, no jvd  Heart:  Normal S1/S2 negative S3 or S4. Regular, no murmur, gallop or rub.    Respiratory: Clear bilaterally x 4, no wheezing or rales  Abdomen:   Soft, non-tender, bowel sounds are active. Extremities:  No edema, normal cap refill, no cyanosis. Musculoskeletal: No clubbing  Neuro: A&Ox3, speech clear, gait stable. Skin: Skin color is normal. No rashes or lesions. Non diaphoretic  Vascular: 2+ pulses symmetric in all extremities    Cardiographics    Ekg: nsr    Pacer - PAT    Results for orders placed or performed during the hospital encounter of 02/08/20   EKG, 12 LEAD, INITIAL   Result Value Ref Range    Ventricular Rate 60 BPM    Atrial Rate 60 BPM    P-R Interval 246 ms    QRS Duration 106 ms    Q-T Interval 418 ms    QTC Calculation (Bezet) 418 ms    Calculated P Axis 45 degrees    Calculated R Axis -29 degrees    Calculated T Axis -6 degrees    Diagnosis        Suspect unspecified pacemaker failure  Sinus rhythm with 1st degree AV block  Moderate voltage criteria for LVH, may be normal variant  Cannot rule out Septal infarct , age undetermined  Abnormal ECG  When compared with ECG of 18-APR-2019 17:57,  Minimal criteria for Septal infarct are now present  Confirmed by Lu Urrutia MD, --- ([id number]) on 2/9/2020 7:12:47 AM           Lab Results   Component Value Date/Time    WBC 8.2 02/08/2020 08:45 PM    HGB 16.4 02/08/2020 08:45 PM    HCT 50.3 02/08/2020 08:45 PM    PLATELET 704 14/74/1544 08:45 PM    MCV 96.0 02/08/2020 08:45 PM      Lab Results   Component Value Date/Time    Sodium 141 02/08/2020 08:45 PM    Potassium 4.3 02/08/2020 08:45 PM    Chloride 102 02/08/2020 08:45 PM    CO2 27 02/08/2020 08:45 PM    Anion gap 12 02/08/2020 08:45 PM    Glucose 132 (H) 02/08/2020 08:45 PM    BUN 22 (H) 02/08/2020 08:45 PM    Creatinine 1.19 02/08/2020 08:45 PM    BUN/Creatinine ratio 18 02/08/2020 08:45 PM    GFR est AA >60 02/08/2020 08:45 PM    GFR est non-AA 58 (L) 02/08/2020 08:45 PM    Calcium 9.3 02/08/2020 08:45 PM    Bilirubin, total 0.4 02/08/2020 08:45 PM    Alk.  phosphatase 74 02/08/2020 08:45 PM    Protein, total 8.5 (H) 02/08/2020 08:45 PM    Albumin 4.0 02/08/2020 08:45 PM    Globulin 4.5 (H) 02/08/2020 08:45 PM    A-G Ratio 0.9 (L) 02/08/2020 08:45 PM    ALT (SGPT) 26 02/08/2020 08:45 PM         Assessment:     Assessment:        ICD-10-CM ICD-9-CM    1. SSS (sick sinus syndrome) ([facility])  I49.5 427.81 AMB POC EKG ROUTINE W/ 12 LEADS, INTER & REP   2. PAT (paroxysmal atrial tachycardia) ([facility])  I47.1 427.0    3. Essential hypertension  I10 401.9    4. Dyslipidemia  E78.5 272.4    5. Mixed hyperlipidemia  E78.2 272.2      Orders Placed This Encounter    AMB POC EKG ROUTINE W/ 12 LEADS, INTER & REP     Order Specific Question:   Reason for Exam:     Answer:   routine    HYDROcodone-acetaminophen (NORCO) 5-325 mg per tablet     Sig: TAKE 1 TABLET BY MOUTH EVERY 4 HOURS AS NEEDED        Plan:   Jo Daniels is having increased fatigue - he never stopped his bb. We will stop his metoprolol and have him f/u with his PCP with regards to his htn. He has PAT but he does not feel it. He is A paced as needed for his sick sinus. His lvef is wnl and his stress r/o ischemia. Cont med rx for htn and hyperlipidemia. Jo Daniels will follow up with me in one year and in the device clinic per routine. Continue medical management for PAT, htn and hyperlipidemia. Thank you for allowing me to participate in Jo Daniels 's care.     Javy Ponce MD, Yg Erps

## 2020-09-30 ENCOUNTER — TRANSCRIBE ORDER (OUTPATIENT)
Dept: SCHEDULING | Age: 85
End: 2020-09-30

## 2020-09-30 DIAGNOSIS — R10.84 GENERALIZED ABDOMINAL PAIN: Primary | ICD-10-CM

## 2020-10-13 ENCOUNTER — OFFICE VISIT (OUTPATIENT)
Dept: SURGERY | Age: 85
End: 2020-10-13
Payer: MEDICARE

## 2020-10-13 VITALS
SYSTOLIC BLOOD PRESSURE: 153 MMHG | HEART RATE: 80 BPM | RESPIRATION RATE: 16 BRPM | DIASTOLIC BLOOD PRESSURE: 105 MMHG | WEIGHT: 168 LBS | BODY MASS INDEX: 27 KG/M2 | TEMPERATURE: 97.5 F | OXYGEN SATURATION: 95 % | HEIGHT: 66 IN

## 2020-10-13 DIAGNOSIS — R10.13 EPIGASTRIC PAIN: Primary | ICD-10-CM

## 2020-10-13 PROCEDURE — G8536 NO DOC ELDER MAL SCRN: HCPCS | Performed by: SURGERY

## 2020-10-13 PROCEDURE — 99215 OFFICE O/P EST HI 40 MIN: CPT | Performed by: SURGERY

## 2020-10-13 PROCEDURE — G8419 CALC BMI OUT NRM PARAM NOF/U: HCPCS | Performed by: SURGERY

## 2020-10-13 PROCEDURE — G8432 DEP SCR NOT DOC, RNG: HCPCS | Performed by: SURGERY

## 2020-10-13 PROCEDURE — 1101F PT FALLS ASSESS-DOCD LE1/YR: CPT | Performed by: SURGERY

## 2020-10-13 PROCEDURE — G8427 DOCREV CUR MEDS BY ELIG CLIN: HCPCS | Performed by: SURGERY

## 2020-10-13 NOTE — PROGRESS NOTES
1. Have you been to the ER, urgent care clinic since your last visit? Hospitalized since your last visit? new pt    2. Have you seen or consulted any other health care providers outside of the [address] [place] [address] since your last visit? Include any pap smears or colon screening.  new pt

## 2020-10-14 DIAGNOSIS — R10.13 EPIGASTRIC PAIN: ICD-10-CM

## 2020-10-14 NOTE — PROGRESS NOTES
Saleem Back is a 80 y.o. male who presents today with the following:  Chief Complaint   Patient presents with    Abdominal Pain       HPI    61-year-old male who I had seen in the hospital for a food bolus back in February presents today for episodes of intermittent cramping that is mostly in the epigastrium. He states that this cramping typically occurs when he bends over and the example he gives is when he ties his shoes. It is very painful and takes his breath away. Typically at worst it last for just a few minutes. It has been present for at least the last 2 to 3 months. It does not occur daily and has been decreasing somewhat in frequency but not in severity. It is not associated with any nausea or vomiting. He is actually gained 10 to 12 pounds over the last year. He states he is gone through a rough spell since his wife passed away in November 2018. He also states over the last year has had decreased energy and increased fatigue. He does not associate any eating with these episodes of abdominal cramping. He has had no dysphagia since he has steak episode with a food bolus in February. He also denies any constipation or any melena or hematochezia. Part of his work-up included a CT scan of the abdomen and pelvis which showed a number of findings. In particular the identified cholelithiasis and sludge within the gallbladder although there was no evidence of any cholecystitis and there was no wall thickening. The pancreas was atrophic. He did have some chronic perinephric stranding bilaterally. He also had evidence of diverticulosis with no definite evidence of diverticulitis. He believes his last colonoscopy was 6 to 8 years ago and he believes it was normal.    He also complains of some urinary urgency. He also has some frequency. He states he has had what sounds like a TURP in the past but his symptoms have become more pronounced over the last year.     He had a pacemaker placed back in June 2019. Past Medical History:   Diagnosis Date    Arthritis     Dyslipidemia     GERD (gastroesophageal reflux disease)     GI bleed     History of prostate surgery     Hypertension     PAT (paroxysmal atrial tachycardia) (HCC)     Prostate enlargement        Past Surgical History:   Procedure Laterality Date    HX COLONOSCOPY  2004    HX ORTHOPAEDIC      multiple shoulder procedures    HX PROSTATECTOMY      HX UROLOGICAL      MS INS NEW/RPLCMT PRM PM W/TRANSV ELTRD ATRIAL&VENT N/A 7/19/2019    INSERT PPM DUAL performed by Francisco Vargas MD at [facility] C[facility]ATH LAB       Social History     Socioeconomic History    Marital status:      Spouse name: Not on file    Number of children: Not on file    Years of education: Not on file    Highest education level: Not on file   Occupational History    Not on file   Social Needs    Financial resource strain: Not on file    Food insecurity     Worry: Not on file     Inability: Not on file    Transportation needs     Medical: Not on file     Non-medical: Not on file   Tobacco Use    Smoking status: Former Smoker    Smokeless tobacco: Never Used    Tobacco comment: quit 30 years ago   Substance and Sexual Activity    Alcohol use:  Yes     Alcohol/week: 14.0 standard drinks     Types: 14 Shots of liquor per week     Comment: Per week     Drug use: No    Sexual activity: Not on file   Lifestyle    Physical activity     Days per week: Not on file     Minutes per session: Not on file    Stress: Not on file   Relationships    Social connections     Talks on phone: Not on file     Gets together: Not on file     Attends Confucianist service: Not on file     Active member of club or organization: Not on file     Attends meetings of clubs or organizations: Not on file     Relationship status: Not on file    Intimate partner violence     Fear of current or ex partner: Not on file     Emotionally abused: Not on file     Physically abused: Not on file     Forced sexual activity: Not on file   Other Topics Concern     Service Not Asked    Blood Transfusions Not Asked    Caffeine Concern Not Asked    Occupational Exposure Not Asked    Hobby Hazards Not Asked    Sleep Concern Not Asked    Stress Concern Not Asked    Weight Concern Not Asked    Special Diet Not Asked    Back Care Not Asked    Exercise Not Asked    Bike Helmet Not Asked   [address] Not Asked    Self-Exams Not Asked   Social History Narrative    Not on file       Family History   Problem Relation Age of Onset    No Known Problems Mother        No Known Allergies    Current Outpatient Medications   Medication Sig    HYDROcodone-acetaminophen (NORCO) 5-325 mg per tablet TAKE 1 TABLET BY MOUTH EVERY 4 HOURS AS NEEDED    cyanocobalamin (VITAMIN B12) 1,000 mcg/mL injection 1,000 mcg by IntraMUSCular route every month.  tamsulosin (FLOMAX) 0.4 mg capsule Take 0.4 mg by mouth daily.  vit C/E/Zn/coppr/lutein/zeaxan (PRESERVISION AREDS-2 PO) Take 1 Tab by mouth daily.  lovastatin (MEVACOR) 20 mg tablet Take 20 mg by mouth nightly.  pantoprazole (PROTONIX) 40 mg tablet Take 40 mg by mouth daily. No current facility-administered medications for this visit. The above histories, medications and allergies have been reviewed. Review of Systems   Eyes: Positive for blurred vision. Gastrointestinal: Positive for abdominal pain. Genitourinary: Positive for frequency. Visit Vitals  BP (!) 153/105 (BP 1 Location: Left arm, BP Patient Position: Sitting)   Pulse 80   Temp 97.5 °F (36.4 °C) (Temporal)   Resp 16   Ht 5' 6\" (1.676 m)   Wt 168 lb (76.2 kg)   SpO2 95%   BMI 27.12 kg/m²     Physical Exam  Constitutional:       Appearance: Normal appearance. Cardiovascular:      Rate and Rhythm: Normal rate and regular rhythm. Pulmonary:      Effort: Pulmonary effort is normal.      Breath sounds: Normal breath sounds.    Abdominal:      General: There is no distension. Palpations: Abdomen is soft. There is no mass. Comments: Some mild tenderness in the left lower quadrant without guarding or rebound   Neurological:      Mental Status: He is alert. 1. Epigastric pain  I believe his pain most likely is musculoskeletal.  The very short duration of the pain is somewhat reassuring. The fact that there is no association with eating also pushes away from something more concerning like gallbladder disease. He does have a history of having a food bolus and there was edema near the EG junction. At the time that we did the EGD and removed the food bolus we recommended that he have a follow-up EGD in 6 to 8 weeks but he did not come back. I would like to start with an upper GI. Depending on what this shows will determine what the next step is. His tenderness in the left lower quadrant may represent a mild diverticulitis however the CT scan did not show evidence of this. I do not feel like we need to treated with antibiotics at this point since he was not having any pain there but did have tenderness. After he follows up we will determine what the next step is which I would imagine would include an EGD. - XR UPPER GI W KUB/ BA SWALLOW; Future      Follow-up and Dispositions    · Return in about 4 weeks (around 11/10/2020) for after testing.          Macy Vargas MD

## 2020-10-14 NOTE — PATIENT INSTRUCTIONS
Upper GI Series: About This Test  What is it? An upper gastrointestinal (GI) series looks at the upper and middle sections of the gastrointestinal tract. The test uses barium contrast material, fluoroscopy, and X-ray. Fluoroscopy is a kind of X-ray. Why is this test done? An upper GI series is done to find the cause of symptoms such as vomiting, burping up food, trouble swallowing, poor weight gain, bleeding, or belly pain. It's used to find narrow spots or blockages in the upper intestinal tract. The test can also find ulcers, polyps, and pyloric stenosis. How do you prepare for the test?  You may have to eat a low-fiber diet for a few days before the test, stop eating for 12 hours before the test, or both. You may also need to take a laxative to help clean out your intestines the evening before the test and stop taking certain medicines. How is the test done? · You will need to take off your clothes and put on a hospital gown. · Take out any dentures, and take off any jewelry. · You will lie on your back on an X-ray table. · You will have an X-ray taken before you drink the barium mix. Then you'll take small swallows repeatedly during the series of X-rays that follow. · The doctor watches the barium pass through your GI tract using fluoroscopy and X-ray pictures. The table is tilted at different positions, and you may change positions to help spread the barium. · You may be given a laxative or enema to flush the barium out of your intestines after the test to prevent constipation. How long does the test take? The test will take about 30 to 40 minutes. If you are also having a small bowel study, the test will take 2 to 6 hours. What happens after the test?  · You will probably be able to go home right away. Results of the test are usually ready in 1 to 3 days. · You can go back to your usual activities right away. You may eat and drink whatever you like, unless your doctor tells you not to. It's a good idea to drink a lot of fluids for a few days to flush out the barium. · For 1 to 3 days after the test, your stool (feces) will look white from the barium. · If the barium stays in your intestine, it can harden and cause a blockage. If you get constipated, you may need to use a laxative to pass a stool. · In some cases, you may be asked to come back after 24 hours to have more X-rays taken. When should you call for help? Watch closely for changes in your health, and be sure to contact your doctor if:  · You aren't able to have a bowel movement in 2 to 3 days after the test.  Follow-up care is a key part of your treatment and safety. Be sure to make and go to all appointments, and call your doctor if you are having problems. It's also a good idea to keep a list of the medicines you take. Ask your doctor when you can expect to have your test results. Where can you learn more? Go to http://www.gray.com/  Enter J416 in the search box to learn more about \"Upper GI Series: About This Test.\"  Current as of: December 9, 2019               Content Version: 12.6  © 6025-3837 [employer]. Care instructions adapted under license by [employer] (which disclaims liability or warranty for this information). If you have questions about a medical condition or this instruction, always ask your healthcare professional. [place] [address] any warranty or liability for your use of this information.

## 2020-11-02 ENCOUNTER — OFFICE VISIT (OUTPATIENT)
Dept: CARDIOLOGY CLINIC | Age: 85
End: 2020-11-02
Payer: MEDICARE

## 2020-11-02 DIAGNOSIS — I44.2 CHB (COMPLETE HEART BLOCK) (HCC): ICD-10-CM

## 2020-11-02 DIAGNOSIS — Z95.0 CARDIAC PACEMAKER IN SITU: Primary | ICD-10-CM

## 2020-11-02 PROCEDURE — 93294 REM INTERROG EVL PM/LDLS PM: CPT | Performed by: INTERNAL MEDICINE

## 2020-11-02 PROCEDURE — 93296 REM INTERROG EVL PM/IDS: CPT | Performed by: INTERNAL MEDICINE

## 2020-11-09 ENCOUNTER — DOCUMENTATION ONLY (OUTPATIENT)
Dept: SURGERY | Age: 85
End: 2020-11-09

## 2020-11-09 NOTE — PROGRESS NOTES
SCHEDULED PATIENT FOR UPPER GI ON 11/11 @ 9:30 @ [facility].  ARRIVE 30 MINUTES EARLY AND NPO 4-6 HRS PRIOR

## 2020-11-18 ENCOUNTER — OFFICE VISIT (OUTPATIENT)
Dept: SURGERY | Age: 85
End: 2020-11-18
Payer: MEDICARE

## 2020-11-18 VITALS
HEART RATE: 83 BPM | DIASTOLIC BLOOD PRESSURE: 86 MMHG | TEMPERATURE: 96.6 F | WEIGHT: 168 LBS | BODY MASS INDEX: 27 KG/M2 | HEIGHT: 66 IN | SYSTOLIC BLOOD PRESSURE: 132 MMHG

## 2020-11-18 DIAGNOSIS — R10.13 EPIGASTRIC PAIN: Primary | ICD-10-CM

## 2020-11-18 PROCEDURE — 1101F PT FALLS ASSESS-DOCD LE1/YR: CPT | Performed by: SURGERY

## 2020-11-18 PROCEDURE — G8536 NO DOC ELDER MAL SCRN: HCPCS | Performed by: SURGERY

## 2020-11-18 PROCEDURE — G8419 CALC BMI OUT NRM PARAM NOF/U: HCPCS | Performed by: SURGERY

## 2020-11-18 PROCEDURE — G8432 DEP SCR NOT DOC, RNG: HCPCS | Performed by: SURGERY

## 2020-11-18 PROCEDURE — G8427 DOCREV CUR MEDS BY ELIG CLIN: HCPCS | Performed by: SURGERY

## 2020-11-18 PROCEDURE — 99214 OFFICE O/P EST MOD 30 MIN: CPT | Performed by: SURGERY

## 2020-11-18 RX ORDER — PANTOPRAZOLE SODIUM 40 MG/1
40 TABLET, DELAYED RELEASE ORAL DAILY
Qty: 30 TAB | Refills: 3 | Status: SHIPPED | OUTPATIENT
Start: 2020-11-18 | End: 2020-12-04

## 2020-11-18 NOTE — PROGRESS NOTES
Julissa Duron is a 80 y.o. male who presents today with the following:  Chief Complaint   Patient presents with    Results       HPI    Mr. Junior Obregon presents in follow-up after his upper GI. They found a small sliding hiatal hernia but no other significant abnormalities. He states post procedure he developed diarrhea for at least 4 days. This is continued into the day but appears to be slowly improving. He also occasionally gets sharp twinges of epigastric pain when he coughs. This is short-lived. He has had no nausea or vomiting and has been eating okay. He states 2 to 3 weeks ago he stopped his pantoprazole because [employer] states that his doctor canceled it. He states this does not appear to be the case. Past Medical History:   Diagnosis Date    Arthritis     Dyslipidemia     GERD (gastroesophageal reflux disease)     GI bleed     History of prostate surgery     Hypertension     PAT (paroxysmal atrial tachycardia) (HCC)     Prostate enlargement        Past Surgical History:   Procedure Laterality Date    HX COLONOSCOPY  2004    HX ORTHOPAEDIC      multiple shoulder procedures    HX PROSTATECTOMY      HX UROLOGICAL      OK INS NEW/RPLCMT PRM PM W/TRANSV ELTRD ATRIAL&VENT N/A 7/19/2019    INSERT PPM DUAL performed by Jorge Torres MD at [facility] CARDIAC CATH LAB       Social History     Socioeconomic History    Marital status:      Spouse name: Not on file    Number of children: Not on file    Years of education: Not on file    Highest education level: Not on file   Occupational History    Not on file   Social Needs    Financial resource strain: Not on file    Food insecurity     Worry: Not on file     Inability: Not on file    Transportation needs     Medical: Not on file     Non-medical: Not on file   Tobacco Use    Smoking status: Former Smoker    Smokeless tobacco: Never Used    Tobacco comment: quit 30 years ago   Substance and Sexual Activity    Alcohol use:  Yes Alcohol/week: 14.0 standard drinks     Types: 14 Shots of liquor per week     Comment: Per week     Drug use: No    Sexual activity: Not on file   Lifestyle    Physical activity     Days per week: Not on file     Minutes per session: Not on file    Stress: Not on file   Relationships    Social connections     Talks on phone: Not on file     Gets together: Not on file     Attends Gnosticist service: Not on file     Active member of club or organization: Not on file     Attends meetings of clubs or organizations: Not on file     Relationship status: Not on file    Intimate partner violence     Fear of current or ex partner: Not on file     Emotionally abused: Not on file     Physically abused: Not on file     Forced sexual activity: Not on file   Other Topics Concern     Service Not Asked    Blood Transfusions Not Asked    Caffeine Concern Not Asked    Occupational Exposure Not Asked   Andrew Norman Hazards Not Asked    Sleep Concern Not Asked    Stress Concern Not Asked    Weight Concern Not Asked    Special Diet Not Asked    Back Care Not Asked    Exercise Not Asked    Bike Helmet Not Asked    Seat Belt Not Asked    Self-Exams Not Asked   Social History Narrative    Not on file       Family History   Problem Relation Age of Onset    No Known Problems Mother        No Known Allergies    Current Outpatient Medications   Medication Sig    HYDROcodone-acetaminophen (NORCO) 5-325 mg per tablet TAKE 1 TABLET BY MOUTH EVERY 4 HOURS AS NEEDED    cyanocobalamin (VITAMIN B12) 1,000 mcg/mL injection 1,000 mcg by IntraMUSCular route every month.  tamsulosin (FLOMAX) 0.4 mg capsule Take 0.4 mg by mouth daily.  vit C/E/Zn/coppr/lutein/zeaxan (PRESERVISION AREDS-2 PO) Take 1 Tab by mouth daily.  lovastatin (MEVACOR) 20 mg tablet Take 20 mg by mouth nightly.  pantoprazole (PROTONIX) 40 mg tablet Take 40 mg by mouth daily. No current facility-administered medications for this visit.         The above histories, medications and allergies have been reviewed. ROS    Visit Vitals  /86 (BP 1 Location: Left arm, BP Patient Position: Sitting)   Pulse 83   Temp (!) 96.6 °F (35.9 °C) (Temporal)   Ht 5' 6\" (1.676 m)   Wt 168 lb (76.2 kg)   BMI 27.12 kg/m²     Physical Exam  Constitutional:       Appearance: Normal appearance. Cardiovascular:      Rate and Rhythm: Normal rate and regular rhythm. Pulmonary:      Effort: Pulmonary effort is normal.      Breath sounds: Normal breath sounds. Abdominal:      General: There is no distension. Palpations: Abdomen is soft. There is no mass. Comments: Mild epigastric and right upper quadrant tenderness. There is no guarding or rebound. There are no palpable masses. Neurological:      Mental Status: He is alert. Barium esophagram and upper GI series dated 11/11/2020     History is epigastric pain     Total number of images obtained: 43     Fluoroscopy time: 1.3 minutes.     Radiation dose: 66.35 mGy     The preliminary  film was unremarkable. The previously, the patient drank  thin barium suspension and rapid sequence/cine images of the hypopharynx were  obtained in the frontal and lateral projections. No intrinsic hypopharyngeal  abnormalities were detected. Prominent impression upon the posterior aspect of  the hyperaeration is noted at the C5 level. This is likely associated with  hypertrophy of the cricopharyngeus muscle. Subsequently, the patient drank  Citrocarbonate and thick barium suspension and fluoroscopic evaluation of the  esophagus was performed. The esophagus demonstrated normal motility with no  evidence of mucosal abnormality, stricture or mass lesion. No delay in transit  of barium through the esophagus into the stomach was observed. A small sliding  hiatal hernia was noted. No gastroesophageal reflux was demonstrated at the time  of this examination the stomach revealed no evidence of mass also.  The duodenal  bulb distended normally and exhibited no evidence of peptic ulcer disease.     IMPRESSION  IMPRESSION:  1. Impression upon the posterior aspect of the hypopharynx at the C5 level  likely secondary to hypertrophy of the cricopharyngeus muscle. 2. Presence of a small sliding hiatal hernia. No gastroesophageal reflux  demonstrated at the time of this examination. No delay in transit of barium  through the esophagus into the stomach. 3. No evidence of active peptic ulcer disease. 1. Epigastric pain  Recommend EGD. Risks of the procedure were shared with the patient including the risks of aspiration or esophageal or gastric perforation. All questions were answered to the patient's satisfaction. We will schedule. The patient was counseled at length about the risks of juliet Covid-19 during their perioperative period and any recovery window from their procedure. The patient was made aware that juliet Covid-19  may worsen their prognosis for recovering from their procedure and lend to a higher morbidity and/or mortality risk. All material risks, benefits, and reasonable alternatives including postponing the procedure were discussed. The patient does  wish to proceed with the procedure at this time. Follow-up and Dispositions    · Return for post procedure.          Renae Erwin MD

## 2020-11-18 NOTE — PATIENT INSTRUCTIONS
Upper GI Endoscopy: Before Your Procedure What is an upper GI endoscopy? An upper gastrointestinal (or GI) endoscopy is a test that allows your doctor to look at the inside of your esophagus, stomach, and the first part of your small intestine, called the duodenum. The esophagus is the tube that carries food to your stomach. The doctor uses a thin, lighted tube that bends. It is called an endoscope, or scope. The doctor puts the tip of the scope in your mouth and gently moves it down your throat. The scope is a flexible video camera. The doctor looks at a monitor (like a TV set or a computer screen) as he or she moves the scope. A doctor may do this procedure to look for ulcers, tumors, infection, or bleeding. It also can be used to look for signs of acid backing up into your esophagus. This is called gastroesophageal reflux disease, or GERD. The doctor can use the scope to take a sample of tissue for study (a biopsy). The doctor also can use the scope to take out growths or stop bleeding. Follow-up care is a key part of your treatment and safety. Be sure to make and go to all appointments, and call your doctor if you are having problems. It's also a good idea to know your test results and keep a list of the medicines you take. How do you prepare for the procedure? Procedures can be stressful. This information will help you understand what you can expect. And it will help you safely prepare for your procedure. Preparing for the procedure 
  · Do not eat or drink anything for 6 to 8 hours before the test. An empty stomach helps your doctor see your stomach clearly during the test. It also reduces your chances of vomiting. If you vomit, there is a small risk that the vomit could enter your lungs. (This is called aspiration.) If the test is done in an emergency, a tube may be inserted through your nose or mouth to empty your stomach.   · Do not take sucralfate (Carafate) or antacids on the day of the test. These medicines can make it hard for your doctor to see your upper GI tract.  
  · If your doctor tells you to, stop taking iron supplements 7 to 14 days before the test.  
  · Be sure you have someone to take you home. Anesthesia and pain medicine will make it unsafe for you to drive or get home on your own.  
  · Understand exactly what procedure is planned, along with the risks, benefits, and other options. · Tell your doctor ALL the medicines, vitamins, supplements, and herbal remedies you take. Some may increase the risk of problems during your procedure. Your doctor will tell you if you should stop taking any of them before the procedure and how soon to do it.  
  · If you take aspirin or some other blood thinner, ask your doctor if you should stop taking it before your procedure. Make sure that you understand exactly what your doctor wants you to do. These medicines increase the risk of bleeding.  
  · Make sure your doctor and the hospital have a copy of your advance directive. If you don't have one, you may want to prepare one. It lets others know your health care wishes. It's a good thing to have before any type of surgery or procedure. What happens on the day of the procedure? · Follow the instructions exactly about when to stop eating and drinking. If you don't, your procedure may be canceled. If your doctor told you to take your medicines on the day of the procedure, take them with only a sip of water.  
  · Take a bath or shower before you come in for your procedure. Do not apply lotions, perfumes, deodorants, or nail polish.  
  · Take off all jewelry and piercings. And take out contact lenses, if you wear them. At the hospital or surgery center · Bring a picture ID.  
  · The test may take 15 to 30 minutes.  
  · The doctor may spray medicine on the back of your throat to numb it. You also will get medicine to prevent pain and to relax you.  
  · You will lie on your left side. The doctor will put the scope in your mouth and toward the back of your throat. The doctor will tell you when to swallow. This helps the scope move down your throat. You will be able to breathe normally. The doctor will move the scope down your esophagus into your stomach. The doctor also may look at the duodenum.  
  · If your doctor wants to take a sample of tissue for a biopsy, he or she may use small surgical tools, which are put into the scope, to cut off some tissue. You will not feel a biopsy, if one is taken. The doctor also can use the tools to stop bleeding or to do other treatments, if needed.  
  · You will stay at the hospital or surgery center for 1 to 2 hours until the medicine you were given wears off. What happens after an upper GI endoscopy? · After the test, you may belch and feel bloated for a while.  
  · You may have a tickling, dry throat or mouth. You may feel a bit hoarse, and you may have a mild sore throat. These symptoms may last several days. Throat lozenges and warm saltwater gargles can help relieve the throat symptoms.  
  · Don't drive or operate machinery for 12 hours after the test.  
  · Your doctor will tell you when you can go back to your usual diet and activities.  
  · Don't drink alcohol for 12 to 24 hours after the test.  
When should you call your doctor? · You have questions or concerns.  
  · You don't understand how to prepare for your procedure.  
  · You become ill before the procedure (such as fever, flu, or a cold).  
  · You need to reschedule or have changed your mind about having the procedure. Where can you learn more? Go to http://www.gray.com/ Enter P790 in the search box to learn more about \"Upper GI Endoscopy: Before Your Procedure. \" Current as of: April 15, 2020               Content Version: 12.6 © 5680-9613 [employer]. Care instructions adapted under license by [employer] (which disclaims liability or warranty for this information). If you have questions about a medical condition or this instruction, always ask your healthcare professional. [address] any warranty or liability for your use of this information.

## 2020-12-28 ENCOUNTER — OFFICE VISIT (OUTPATIENT)
Dept: SURGERY | Age: 85
End: 2020-12-28
Payer: MEDICARE

## 2020-12-28 VITALS
WEIGHT: 168 LBS | BODY MASS INDEX: 27 KG/M2 | DIASTOLIC BLOOD PRESSURE: 82 MMHG | HEART RATE: 92 BPM | SYSTOLIC BLOOD PRESSURE: 162 MMHG | HEIGHT: 66 IN | TEMPERATURE: 98.6 F

## 2020-12-28 DIAGNOSIS — R10.13 EPIGASTRIC PAIN: Primary | ICD-10-CM

## 2020-12-28 PROCEDURE — 1101F PT FALLS ASSESS-DOCD LE1/YR: CPT | Performed by: SURGERY

## 2020-12-28 PROCEDURE — G8419 CALC BMI OUT NRM PARAM NOF/U: HCPCS | Performed by: SURGERY

## 2020-12-28 PROCEDURE — G8536 NO DOC ELDER MAL SCRN: HCPCS | Performed by: SURGERY

## 2020-12-28 PROCEDURE — G8427 DOCREV CUR MEDS BY ELIG CLIN: HCPCS | Performed by: SURGERY

## 2020-12-28 PROCEDURE — G8432 DEP SCR NOT DOC, RNG: HCPCS | Performed by: SURGERY

## 2020-12-28 PROCEDURE — 99213 OFFICE O/P EST LOW 20 MIN: CPT | Performed by: SURGERY

## 2020-12-28 NOTE — PROGRESS NOTES
Jason Marroquin is a 80 y.o. male who presents today with the following:  Chief Complaint   Patient presents with    Post OP Follow Up       HPI    Mr. Blossom Ashley presents in follow-up after his EGD. Findings were shared with him. The biopsies were essentially negative but visualization did show some mild gastritis. There were no ulcers or masses no stricture. He did have a hiatal hernia. On questioning him again about his symptoms he states is most pronounced when he bends down to pull up his socks. He will have an area of discomfort in the epigastric region that would last for a few seconds when he straightens up. He has no association with eating. Past Medical History:   Diagnosis Date    Arthritis     Dyslipidemia     GERD (gastroesophageal reflux disease)     GI bleed     History of prostate surgery     Hypertension     PAT (paroxysmal atrial tachycardia) (HCC)     Prostate enlargement        Past Surgical History:   Procedure Laterality Date    HX COLONOSCOPY  2004    HX ORTHOPAEDIC      multiple shoulder procedures    HX PROSTATECTOMY      HX UROLOGICAL      NE INS NEW/RPLCMT PRM PM W/TRANSV ELTRD ATRIAL&VENT N/A 7/19/2019    INSERT PPM DUAL performed by Dilmarsary Rivera MD at [facility] CARDIAC CATH LAB       Social History     Socioeconomic History    Marital status:      Spouse name: Not on file    Number of children: Not on file    Years of education: Not on file    Highest education level: Not on file   Occupational History    Not on file   Social Needs    Financial resource strain: Not on file    Food insecurity     Worry: Not on file     Inability: Not on file    Transportation needs     Medical: Not on file     Non-medical: Not on file   Tobacco Use    Smoking status: Former Smoker    Smokeless tobacco: Never Used    Tobacco comment: quit 30 years ago   Substance and Sexual Activity    Alcohol use:  Yes     Alcohol/week: 14.0 standard drinks     Types: 14 Shots of liquor per week     Comment: Per week     Drug use: No    Sexual activity: Not on file   Lifestyle    Physical activity     Days per week: Not on file     Minutes per session: Not on file    Stress: Not on file   Relationships    Social connections     Talks on phone: Not on file     Gets together: Not on file     Attends Moravian service: Not on file     Active member of club or organization: Not on file     Attends meetings of clubs or organizations: Not on file     Relationship status: Not on file    Intimate partner violence     Fear of current or ex partner: Not on file     Emotionally abused: Not on file     Physically abused: Not on file     Forced sexual activity: Not on file   Other Topics Concern     Service Not Asked    Blood Transfusions Not Asked    Caffeine Concern Not Asked    Occupational Exposure Not Asked   [employer] Hazards Not Asked    Sleep Concern Not Asked    Stress Concern Not Asked    Weight Concern Not Asked    Special Diet Not Asked    Back Care Not Asked    Exercise Not Asked    Bike Helmet Not Asked   [address] Not Asked    Self-Exams Not Asked   Social History Narrative    Not on file       Family History   Problem Relation Age of Onset    No Known Problems Mother        No Known Allergies    Current Outpatient Medications   Medication Sig    finasteride (PROSCAR) 5 mg tablet Take 5 mg by mouth daily.  cyanocobalamin (VITAMIN B12) 1,000 mcg/mL injection 1,000 mcg by IntraMUSCular route every month.  tamsulosin (FLOMAX) 0.4 mg capsule Take 0.4 mg by mouth daily.  vit C/E/Zn/coppr/lutein/zeaxan (PRESERVISION AREDS-2 PO) Take 1 Tab by mouth daily.  lovastatin (MEVACOR) 20 mg tablet Take 20 mg by mouth nightly.  pantoprazole (PROTONIX) 40 mg tablet Take 40 mg by mouth daily.  HYDROcodone-acetaminophen (NORCO) 5-325 mg per tablet TAKE 1 TABLET BY MOUTH EVERY 4 HOURS AS NEEDED     No current facility-administered medications for this visit.         The above histories, medications and allergies have been reviewed. ROS    Visit Vitals  BP (!) 162/82 (BP 1 Location: Left arm, BP Patient Position: Sitting)   Pulse 92   Temp 98.6 °F (37 °C) (Temporal)   Ht 5' 6\" (1.676 m)   Wt 168 lb (76.2 kg)   BMI 27.12 kg/m²     Physical Exam  Constitutional:       Appearance: Normal appearance. Neurological:      Mental Status: He is alert. 1. Epigastric pain  No clear evidence from EGD of the source of his problems. This certainly does not seem like typical reflux. He is on a PPI. There is no association with food. He has had no nausea or vomiting with this. It is possible that he is getting some bloating. I have recommended that he try a course of Gas-X to see if this improves his symptoms when he bends down to pull off his socks. If this fails to improve his symptoms I would like to see him again. I feel that this most likely is either bloating or musculoskeletal in nature but we certainly can reevaluate. Follow-up in approximately 2 months. Follow-up and Dispositions    · Return in about 2 months (around 2/28/2021) for recheck.          Gigi Alarcon MD

## 2020-12-28 NOTE — PATIENT INSTRUCTIONS
Indigestion (Dyspepsia or Heartburn): Care Instructions Your Care Instructions Sometimes it can be hard to pinpoint the cause of indigestion. (It is also called dyspepsia or heartburn.) Most cases of an upset stomach with bloating, burning, burping, and nausea are minor and go away within several hours. Home treatment and over-the-counter medicine often are able to control symptoms. But if you take medicine to relieve your indigestion without making diet and lifestyle changes, your symptoms are likely to return again and again. If you get indigestion often, it may be a sign of a more serious medical problem. Be sure to follow up with your doctor, who may want to do tests to be sure of the cause of your indigestion. Follow-up care is a key part of your treatment and safety. Be sure to make and go to all appointments, and call your doctor if you are having problems. It's also a good idea to know your test results and keep a list of the medicines you take. How can you care for yourself at home? · Your doctor may recommend over-the-counter medicine. For mild or occasional indigestion, antacids such as Gaviscon, Mylanta, Maalox, or Tums, may help. Be safe with medicines. Be careful when you take over-the-counter antacid medicines. Many of these medicines have aspirin in them. Read the label to make sure that you are not taking more than the recommended dose. Too much aspirin can be harmful. · Your doctor also may recommend over-the-counter acid reducers, such as Pepcid AC (famotidine), Tagamet HB (cimetidine), or Prilosec (omeprazole). Read and follow all instructions on the label. If you use these medicines often, talk with your doctor. · Change your eating habits. ? It's best to eat several small meals instead of two or three large meals. ? After you eat, wait 2 to 3 hours before you lie down. ? Chocolate, mint, and alcohol can make GERD worse. ? Spicy foods, foods that have a lot of acid (like tomatoes and oranges), and coffee can make GERD symptoms worse in some people. If your symptoms are worse after you eat a certain food, you may want to stop eating that food to see if your symptoms get better. · Do not smoke or chew tobacco. Smoking can make GERD worse. If you need help quitting, talk to your doctor about stop-smoking programs and medicines. These can increase your chances of quitting for good. · If you have GERD symptoms at night, raise the head of your bed 6 to 8 inches. You can do this by putting the frame on blocks or placing a foam wedge under the head of your mattress. (Adding extra pillows does not work.) · Do not wear tight clothing around your middle. · Lose weight if you need to. Losing just 5 to 10 pounds can help. · Do not take anti-inflammatory medicines, such as aspirin, ibuprofen (Advil, Motrin), or naproxen (Aleve). These can irritate the stomach. If you need a pain medicine, try acetaminophen (Tylenol), which does not cause stomach upset. When should you call for help? Call your doctor now or seek immediate medical care if: 
  · You have new or worse belly pain.  
  · You are vomiting. Watch closely for changes in your health, and be sure to contact your doctor if: 
  · You have new or worse symptoms of indigestion.  
  · You have trouble or pain swallowing.  
  · You are losing weight.  
  · You do not get better as expected. Where can you learn more? Go to http://www.gray.com/ Enter A264 in the search box to learn more about \"Indigestion (Dyspepsia or Heartburn): Care Instructions. \" Current as of: April 15, 2020               Content Version: 12.6 © 3078-4248 [employer]. Care instructions adapted under license by [employer] (which disclaims liability or warranty for this information). If you have questions about a medical condition or this instruction, always ask your healthcare professional. [address] any warranty or liability for your use of this information.

## 2021-01-01 ENCOUNTER — HOSPITAL ENCOUNTER (EMERGENCY)
Age: 86
Discharge: HOME | End: 2021-09-28
Attending: EMERGENCY MEDICINE | Admitting: EMERGENCY MEDICINE
Payer: MEDICARE

## 2021-01-01 ENCOUNTER — OFFICE VISIT (OUTPATIENT)
Dept: CARDIOLOGY CLINIC | Age: 86
End: 2021-01-01
Payer: MEDICARE

## 2021-01-01 VITALS
DIASTOLIC BLOOD PRESSURE: 91 MMHG | BODY MASS INDEX: 24.86 KG/M2 | RESPIRATION RATE: 19 BRPM | TEMPERATURE: 98.2 F | HEART RATE: 65 BPM | SYSTOLIC BLOOD PRESSURE: 195 MMHG | WEIGHT: 154 LBS | OXYGEN SATURATION: 95 %

## 2021-01-01 DIAGNOSIS — N39.0 URINARY TRACT INFECTION ASSOCIATED WITH INDWELLING URETHRAL CATHETER, INITIAL ENCOUNTER (HCC): ICD-10-CM

## 2021-01-01 DIAGNOSIS — Z95.0 CARDIAC PACEMAKER IN SITU: Primary | ICD-10-CM

## 2021-01-01 DIAGNOSIS — I49.5 SSS (SICK SINUS SYNDROME) (HCC): ICD-10-CM

## 2021-01-01 DIAGNOSIS — N32.89 BLADDER SPASM: Primary | ICD-10-CM

## 2021-01-01 DIAGNOSIS — T83.511A URINARY TRACT INFECTION ASSOCIATED WITH INDWELLING URETHRAL CATHETER, INITIAL ENCOUNTER (HCC): ICD-10-CM

## 2021-01-01 LAB
AMORPH CRY URNS QL MICRO: ABNORMAL
APPEARANCE UR: ABNORMAL
BACTERIA SPEC CULT: NORMAL
BACTERIA URNS QL MICRO: ABNORMAL /HPF
BILIRUB UR QL: NEGATIVE
CAOX CRY URNS QL MICRO: ABNORMAL
COLOR UR: ABNORMAL
EPITH CASTS URNS QL MICRO: ABNORMAL /LPF
GLUCOSE UR STRIP.AUTO-MCNC: NEGATIVE MG/DL
HGB UR QL STRIP: ABNORMAL
HYALINE CASTS URNS QL MICRO: ABNORMAL /LPF (ref 0–5)
KETONES UR QL STRIP.AUTO: NEGATIVE MG/DL
LEUKOCYTE ESTERASE UR QL STRIP.AUTO: ABNORMAL
MUCOUS THREADS URNS QL MICRO: ABNORMAL /LPF
NITRITE UR QL STRIP.AUTO: NEGATIVE
PH UR STRIP: 5.5 [PH] (ref 5–8)
PROT UR STRIP-MCNC: 30 MG/DL
RBC #/AREA URNS HPF: ABNORMAL /HPF (ref 0–5)
SERVICE CMNT-IMP: NORMAL
SP GR UR REFRACTOMETRY: 1.01 (ref 1–1.03)
UA: UC IF INDICATED,UAUC: ABNORMAL
UROBILINOGEN UR QL STRIP.AUTO: 0.2 EU/DL (ref 0.2–1)
WBC URNS QL MICRO: ABNORMAL /HPF (ref 0–4)

## 2021-01-01 PROCEDURE — 74011250637 HC RX REV CODE- 250/637: Performed by: EMERGENCY MEDICINE

## 2021-01-01 PROCEDURE — 99283 EMERGENCY DEPT VISIT LOW MDM: CPT

## 2021-01-01 PROCEDURE — 93294 REM INTERROG EVL PM/LDLS PM: CPT | Performed by: INTERNAL MEDICINE

## 2021-01-01 PROCEDURE — 81001 URINALYSIS AUTO W/SCOPE: CPT

## 2021-01-01 PROCEDURE — 93296 REM INTERROG EVL PM/IDS: CPT | Performed by: INTERNAL MEDICINE

## 2021-01-01 PROCEDURE — 87086 URINE CULTURE/COLONY COUNT: CPT

## 2021-01-01 RX ORDER — PHENAZOPYRIDINE HYDROCHLORIDE 100 MG/1
200 TABLET, FILM COATED ORAL
Status: COMPLETED | OUTPATIENT
Start: 2021-01-01 | End: 2021-01-01

## 2021-01-01 RX ORDER — PHENAZOPYRIDINE HYDROCHLORIDE 200 MG/1
200 TABLET, FILM COATED ORAL 3 TIMES DAILY
Qty: 6 TABLET | Refills: 0 | Status: SHIPPED | OUTPATIENT
Start: 2021-01-01 | End: 2021-01-01

## 2021-01-01 RX ORDER — CEPHALEXIN 500 MG/1
500 CAPSULE ORAL 3 TIMES DAILY
Qty: 21 CAPSULE | Refills: 0 | Status: SHIPPED | OUTPATIENT
Start: 2021-01-01 | End: 2021-01-01 | Stop reason: CLARIF

## 2021-01-01 RX ORDER — LIDOCAINE HYDROCHLORIDE 20 MG/ML
JELLY TOPICAL
Qty: 30 ML | Refills: 0 | Status: SHIPPED | OUTPATIENT
Start: 2021-01-01

## 2021-01-01 RX ADMIN — PHENAZOPYRIDINE HYDROCHLORIDE 200 MG: 100 TABLET ORAL at 13:44

## 2021-02-01 ENCOUNTER — OFFICE VISIT (OUTPATIENT)
Dept: CARDIOLOGY CLINIC | Age: 86
End: 2021-02-01
Payer: MEDICARE

## 2021-02-01 DIAGNOSIS — Z95.0 CARDIAC PACEMAKER IN SITU: Primary | ICD-10-CM

## 2021-02-01 DIAGNOSIS — I44.2 CHB (COMPLETE HEART BLOCK) (HCC): ICD-10-CM

## 2021-02-01 PROCEDURE — 93296 REM INTERROG EVL PM/IDS: CPT | Performed by: INTERNAL MEDICINE

## 2021-02-01 PROCEDURE — 93294 REM INTERROG EVL PM/LDLS PM: CPT | Performed by: INTERNAL MEDICINE

## 2021-02-17 ENCOUNTER — OFFICE VISIT (OUTPATIENT)
Dept: SURGERY | Age: 86
End: 2021-02-17
Payer: MEDICARE

## 2021-02-17 VITALS
DIASTOLIC BLOOD PRESSURE: 73 MMHG | WEIGHT: 168 LBS | HEART RATE: 88 BPM | TEMPERATURE: 96.8 F | SYSTOLIC BLOOD PRESSURE: 148 MMHG | BODY MASS INDEX: 27 KG/M2 | HEIGHT: 66 IN

## 2021-02-17 DIAGNOSIS — R10.13 EPIGASTRIC PAIN: Primary | ICD-10-CM

## 2021-02-17 PROCEDURE — G8419 CALC BMI OUT NRM PARAM NOF/U: HCPCS | Performed by: SURGERY

## 2021-02-17 PROCEDURE — G8427 DOCREV CUR MEDS BY ELIG CLIN: HCPCS | Performed by: SURGERY

## 2021-02-17 PROCEDURE — 99213 OFFICE O/P EST LOW 20 MIN: CPT | Performed by: SURGERY

## 2021-02-17 PROCEDURE — G8432 DEP SCR NOT DOC, RNG: HCPCS | Performed by: SURGERY

## 2021-02-17 PROCEDURE — G8536 NO DOC ELDER MAL SCRN: HCPCS | Performed by: SURGERY

## 2021-02-17 PROCEDURE — 1101F PT FALLS ASSESS-DOCD LE1/YR: CPT | Performed by: SURGERY

## 2021-02-17 NOTE — PROGRESS NOTES
Serena Najjar is a 80 y.o. male who presents today with the following:  Chief Complaint   Patient presents with    Follow-up       HPI    Mr. Carly Lima presents for scheduled follow-up today. He is doing much better. He states about once a month he feels like some food may be getting stuck in his throat when he eats too fast but this passes on its own. He also states that the epigastric pain that he been complaining about typically only occurs once every 2 or 3 months. Essentially he feels like it is resolved. He continues to take the pantoprazole daily as scheduled. Past Medical History:   Diagnosis Date    Arthritis     Dyslipidemia     GERD (gastroesophageal reflux disease)     GI bleed     History of prostate surgery     Hypertension     PAT (paroxysmal atrial tachycardia) (HCC)     Prostate enlargement        Past Surgical History:   Procedure Laterality Date    HX COLONOSCOPY  2004    HX ORTHOPAEDIC      multiple shoulder procedures    HX PROSTATECTOMY      HX UROLOGICAL      NE INS NEW/RPLCMT PRM PM W/TRANSV ELTRD ATRIAL&VENT N/A 7/19/2019    INSERT PPM DUAL performed by Nida Barker MD at [facility] CARDIAC CATH LAB       Social History     Socioeconomic History    Marital status:      Spouse name: Not on file    Number of children: Not on file    Years of education: Not on file    Highest education level: Not on file   Occupational History    Not on file   Social Needs    Financial resource strain: Not on file    Food insecurity     Worry: Not on file     Inability: Not on file    Transportation needs     Medical: Not on file     Non-medical: Not on file   Tobacco Use    Smoking status: Former Smoker    Smokeless tobacco: Never Used    Tobacco comment: quit 30 years ago   Substance and Sexual Activity    Alcohol use:  Yes     Alcohol/week: 14.0 standard drinks     Types: 14 Shots of liquor per week     Comment: Per week     Drug use: No    Sexual activity: Not on file Lifestyle    Physical activity     Days per week: Not on file     Minutes per session: Not on file    Stress: Not on file   Relationships    Social connections     Talks on phone: Not on file     Gets together: Not on file     Attends Christian service: Not on file     Active member of club or organization: Not on file     Attends meetings of clubs or organizations: Not on file     Relationship status: Not on file    Intimate partner violence     Fear of current or ex partner: Not on file     Emotionally abused: Not on file     Physically abused: Not on file     Forced sexual activity: Not on file   Other Topics Concern     Service Not Asked    Blood Transfusions Not Asked    Caffeine Concern Not Asked    Occupational Exposure Not Asked   [place] Hazards Not Asked    Sleep Concern Not Asked    Stress Concern Not Asked    Weight Concern Not Asked    Special Diet Not Asked    Back Care Not Asked    Exercise Not Asked    Bike Helmet Not Asked   [address] Not Asked    Self-Exams Not Asked   Social History Narrative    Not on file       Family History   Problem Relation Age of Onset    No Known Problems Mother        No Known Allergies    Current Outpatient Medications   Medication Sig    finasteride (PROSCAR) 5 mg tablet Take 5 mg by mouth daily.  HYDROcodone-acetaminophen (NORCO) 5-325 mg per tablet TAKE 1 TABLET BY MOUTH EVERY 4 HOURS AS NEEDED    cyanocobalamin (VITAMIN B12) 1,000 mcg/mL injection 1,000 mcg by IntraMUSCular route every month.  tamsulosin (FLOMAX) 0.4 mg capsule Take 0.4 mg by mouth daily.  vit C/E/Zn/coppr/lutein/zeaxan (PRESERVISION AREDS-2 PO) Take 1 Tab by mouth daily.  lovastatin (MEVACOR) 20 mg tablet Take 20 mg by mouth nightly.  pantoprazole (PROTONIX) 40 mg tablet Take 40 mg by mouth daily. No current facility-administered medications for this visit. The above histories, medications and allergies have been reviewed.     ROS    Visit Vitals  BP (!) 148/73 (BP 1 Location: Left upper arm, BP Patient Position: Sitting)   Pulse 88   Temp 96.8 °F (36 °C) (Temporal)   Ht 5' 6\" (1.676 m)   Wt 168 lb (76.2 kg)   BMI 27.12 kg/m²     Physical Exam  Constitutional:       Appearance: Normal appearance. Cardiovascular:      Rate and Rhythm: Normal rate and regular rhythm. Pulmonary:      Effort: Pulmonary effort is normal.      Breath sounds: Normal breath sounds. Abdominal:      General: There is no distension. Palpations: Abdomen is soft. There is no mass. Tenderness: There is no abdominal tenderness. Neurological:      Mental Status: He is alert. 1. Epigastric pain  Improved abdominal pain. I have recommended that he continue on the pantoprazole. I have also encouraged him to continue to eat at a slower pace and chew his food well. He is pleased with his progress. He can follow-up as needed. Follow-up and Dispositions    · Return if symptoms worsen or fail to improve.          Carmelo Reyes MD

## 2021-02-17 NOTE — PROGRESS NOTES
1. Have you been to the ER, urgent care clinic since your last visit? Hospitalized since your last visit? No    2. Have you seen or consulted any other health care providers outside of the [address] [place] [address] since your last visit? Include any pap smears or colon screening.  No

## 2021-02-17 NOTE — LETTER
2/17/2021 Patient: Lizzeth Patel YOB: 1932 Date of Visit: 2/17/2021 Trenton Pérez MD 
[address] [facility] [address] [phone] [address] Fax: [phone] Dear Trenton Pérez MD, Thank you for referring Mr. Tabitha Acuna to [address] [place] [address] for evaluation. My notes for this consultation are attached. If you have questions, please do not hesitate to call me. I look forward to following your patient along with you.  
 
 
Sincerely, 
 
Guille Haskins MD

## 2021-02-17 NOTE — PATIENT INSTRUCTIONS
Learning About Acid-Reducing Medicines What are they? Acid-reducing medicines can help relieve heartburn and other symptoms of indigestion. They can help prevent damage to your digestive system from stomach acids. They also are used to treat reflux and ulcer symptoms. These medicines include H2 blockers and proton pump inhibitors (PPIs). They help your stomach make less acid. You can buy them over the counter. Some of them also come in prescription strengths. Antacids can also help relieve heartburn symptoms. They reduce the acid that is already in your stomach. You can buy them over the counter. Which medicine is best for you depends on what is causing your symptoms. How do they work? Acid-reducing medicines work in two ways. H2 blockers and proton pump inhibitors (PPIs) lower the amount of acid your stomach makes. They don't work on the acid that's already there. Antacids work by making stomach juices less acidic. But your heartburn may come back as your stomach makes more acid. What are some examples? Examples of acid reducers include: H2 blockers. Tagamet (cimetidine) Pepcid (famotidine) Proton pump inhibitors (PPIs). Nexium (esomeprazole) Prevacid (lansoprazole) Prilosec, Zegerid (omeprazole) Protonix (pantoprazole) Aciphex (rabeprazole) Antacids. Gaviscon Karla Mendoza What are side effects might you have? Many people don't have side effects. And minor side effects might go away after a while. H2 blockers can cause headaches or make you dizzy. They might cause diarrhea or constipation. You may have nausea and vomiting. PPIs can cause headaches and diarrhea. Using them for a long time may raise your risk for infections or broken bones. Some antacids can cause constipation or diarrhea. The brands vary in the ingredients they use. They can have different side effects. If you use too much heartburn medicine, your body may not get enough of some minerals from your food. How can you take these medicines safely? Some H2 blockers and PPIs can affect how other medicines work. Tell your doctor if you use other medicines. He or she may change the dose or give you a different medicine. Many antacids have aspirin in them. Read the label to make sure that you don't take too much. Too much aspirin can be harmful. Be safe with medicines. Take your medicines exactly as prescribed. If you take over-the-counter medicine, be sure to read and follow all instructions on the label. Call your doctor if you think you are having a problem with your medicine. Check with your doctor or pharmacist before you use any other medicines. This includes over-the-counter medicines. Tell your doctor about all of the medicines, vitamins, herbal products, and supplements you take. Taking some medicines together can cause problems. Follow-up care is a key part of your treatment and safety. Be sure to make and go to all appointments, and call your doctor if you are having problems. It's also a good idea to know your test results and keep a list of the medicines you take. Where can you learn more? Go to http://www.gray.com/ Enter [phone] in the search box to learn more about \"Learning About Acid-Reducing Medicines. \" Current as of: April 15, 2020               Content Version: 12.6 © 3105-7690 [employer]. Care instructions adapted under license by [employer] (which disclaims liability or warranty for this information). If you have questions about a medical condition or this instruction, always ask your healthcare professional. [address] any warranty or liability for your use of this information.

## 2021-02-22 ENCOUNTER — TELEPHONE (OUTPATIENT)
Dept: CARDIOLOGY CLINIC | Age: 86
End: 2021-02-22

## 2021-02-22 NOTE — TELEPHONE ENCOUNTER
Please call patients son     Wants to know if we are receiving transmissions.     [phone]    Thanks  Isaias Lemus

## 2021-02-23 ENCOUNTER — OFFICE VISIT (OUTPATIENT)
Dept: CARDIOLOGY CLINIC | Age: 86
End: 2021-02-23
Payer: MEDICARE

## 2021-02-23 VITALS
HEART RATE: 92 BPM | DIASTOLIC BLOOD PRESSURE: 72 MMHG | SYSTOLIC BLOOD PRESSURE: 130 MMHG | TEMPERATURE: 98.2 F | BODY MASS INDEX: 25.55 KG/M2 | RESPIRATION RATE: 16 BRPM | HEIGHT: 66 IN | WEIGHT: 159 LBS | OXYGEN SATURATION: 97 %

## 2021-02-23 DIAGNOSIS — M15.9 PRIMARY OSTEOARTHRITIS INVOLVING MULTIPLE JOINTS: ICD-10-CM

## 2021-02-23 DIAGNOSIS — I10 ESSENTIAL HYPERTENSION: ICD-10-CM

## 2021-02-23 DIAGNOSIS — I49.5 SSS (SICK SINUS SYNDROME) (HCC): Primary | ICD-10-CM

## 2021-02-23 DIAGNOSIS — C61 PROSTATE CA (HCC): ICD-10-CM

## 2021-02-23 DIAGNOSIS — Z95.0 CARDIAC PACEMAKER IN SITU: ICD-10-CM

## 2021-02-23 DIAGNOSIS — K21.9 GASTROESOPHAGEAL REFLUX DISEASE, UNSPECIFIED WHETHER ESOPHAGITIS PRESENT: ICD-10-CM

## 2021-02-23 DIAGNOSIS — I47.1 PAT (PAROXYSMAL ATRIAL TACHYCARDIA) (HCC): ICD-10-CM

## 2021-02-23 DIAGNOSIS — E78.5 DYSLIPIDEMIA: ICD-10-CM

## 2021-02-23 PROCEDURE — G8510 SCR DEP NEG, NO PLAN REQD: HCPCS | Performed by: INTERNAL MEDICINE

## 2021-02-23 PROCEDURE — 99214 OFFICE O/P EST MOD 30 MIN: CPT | Performed by: INTERNAL MEDICINE

## 2021-02-23 PROCEDURE — 93000 ELECTROCARDIOGRAM COMPLETE: CPT | Performed by: INTERNAL MEDICINE

## 2021-02-23 PROCEDURE — G8427 DOCREV CUR MEDS BY ELIG CLIN: HCPCS | Performed by: INTERNAL MEDICINE

## 2021-02-23 PROCEDURE — G8419 CALC BMI OUT NRM PARAM NOF/U: HCPCS | Performed by: INTERNAL MEDICINE

## 2021-02-23 PROCEDURE — G8536 NO DOC ELDER MAL SCRN: HCPCS | Performed by: INTERNAL MEDICINE

## 2021-02-23 PROCEDURE — 1101F PT FALLS ASSESS-DOCD LE1/YR: CPT | Performed by: INTERNAL MEDICINE

## 2021-02-23 NOTE — TELEPHONE ENCOUNTER
Spoke with patient's son. Confirmed that we are receiving transmissions and that last download was 2/1/21. He reports no issues \"just wanted to understand how it works\".

## 2021-02-23 NOTE — LETTER
2/23/2021 Patient: Gabriele Barkley YOB: 1932 Date of Visit: 2/23/2021 Rosa Vera MD 
[address] [facility] [phone] [address] Fax: [phone] Dear Rosa Vera MD, Thank you for referring Mr. Francesco Suarez to [facility] for evaluation. My notes for this consultation are attached. If you have questions, please do not hesitate to call me. I look forward to following your patient along with you.  
 
 
Sincerely, 
 
Pily Hess MD

## 2021-02-23 NOTE — PROGRESS NOTES
Chief Complaint   Patient presents with    Irregular Heart Beat     6 mth follow up     Hypertension     Verified patient with two patient identifiers. Medications reviewed/approved by Dr. Juanis Song. 1. Have you been to the ER, urgent care clinic since your last visit? Hospitalized since your last visit? 12/10/20 [facility]    2. Have you seen or consulted any other health care providers outside of the [address] [place] [address] since your last visit? Include any pap smears or colon screening.  no

## 2021-02-23 NOTE — PROGRESS NOTES
Serena Najjar is a 80 y.o. male is here for routine f/u. No specific CV sx or complaints, other than ongoing fatigue, low energy. Low B12 and receiving shots. PPM followed by Dr. Dayna Huerta.  Recent labs per Dr. Tao Brown (?TFT's as part of this). Not as active as previously (COVID related) but still tries to walk, etc.  ?rate responsiveness of PPM previously changed. Recent PPM check scanned in chart--few PAT episodes. Recent EGD with atrophic gastritis. Has not really been taking his meds recently. The patient denies chest pain/ shortness of breath, orthopnea, PND, LE edema, palpitations, syncope, presyncope or fatigue.        Patient Active Problem List    Diagnosis Date Noted    Epigastric pain 10/14/2020    Foreign body in esophagus 02/08/2020    SSS (sick sinus syndrome) ([address] 07/19/2019    [facility]     Hypertension     Dyslipidemia     GI bleed 04/17/2019    OA (osteoarthritis) 04/17/2019    GERD (gastroesophageal reflux disease) 04/17/2019    HTN (hypertension) 04/17/2019    Prostate CA ([address] 04/17/2019      Orin eBth MD  Past Medical History:   Diagnosis Date    Arthritis     Dyslipidemia     GERD (gastroesophageal reflux disease)     GI bleed     History of prostate surgery     Hypertension     PAT (paroxysmal atrial tachycardia) (HCC)     Prostate enlargement       Past Surgical History:   Procedure Laterality Date    HX COLONOSCOPY  2004    HX ORTHOPAEDIC      multiple shoulder procedures    HX PROSTATECTOMY      HX UROLOGICAL      [place] INS NEW/RPLCMT PRM PM W/TRANSV ELTRD ATRIAL&VENT N/A 7/19/2019    INSERT PPM DUAL performed by Nida Barker MD at [facility] CARDIAC CATH LAB     No Known Allergies   Family History   Problem Relation Age of Onset    No Known Problems Mother       Social History     Socioeconomic History    Marital status:      Spouse name: Not on file    Number of children: Not on file    Years of education: Not on file    Highest education level: Not on file   Occupational History    Not on file   Social Needs    Financial resource strain: Not on file    Food insecurity     Worry: Not on file     Inability: Not on file    Transportation needs     Medical: Not on file     Non-medical: Not on file   Tobacco Use    Smoking status: Former Smoker    Smokeless tobacco: Never Used    Tobacco comment: quit 30 years ago   Substance and Sexual Activity    Alcohol use: Yes     Alcohol/week: 14.0 standard drinks     Types: 14 Shots of liquor per week     Comment: Per week     Drug use: No    Sexual activity: Not on file   Lifestyle    Physical activity     Days per week: Not on file     Minutes per session: Not on file    Stress: Not on file   Relationships    Social connections     Talks on phone: Not on file     Gets together: Not on file     Attends Mandaen service: Not on file     Active member of club or organization: Not on file     Attends meetings of clubs or organizations: Not on file     Relationship status: Not on file    Intimate partner violence     Fear of current or ex partner: Not on file     Emotionally abused: Not on file     Physically abused: Not on file     Forced sexual activity: Not on file   Other Topics Concern     Service Not Asked    Blood Transfusions Not Asked    Caffeine Concern Not Asked    Occupational Exposure Not Asked   Wecristhian Baileyon Hazards Not Asked    Sleep Concern Not Asked    Stress Concern Not Asked    Weight Concern Not Asked    Special Diet Not Asked    Back Care Not Asked    Exercise Not Asked    Bike Helmet Not Asked    Seat Belt Not Asked    Self-Exams Not Asked   Social History Narrative    Not on file      Current Outpatient Medications   Medication Sig    finasteride (PROSCAR) 5 mg tablet Take 5 mg by mouth daily.  cyanocobalamin (VITAMIN B12) 1,000 mcg/mL injection 1,000 mcg by IntraMUSCular route every month.     tamsulosin (FLOMAX) 0.4 mg capsule Take 0.4 mg by mouth daily.  vit C/E/Zn/coppr/lutein/zeaxan (PRESERVISION AREDS-2 PO) Take 1 Tab by mouth daily.  lovastatin (MEVACOR) 20 mg tablet Take 20 mg by mouth nightly.  pantoprazole (PROTONIX) 40 mg tablet Take 40 mg by mouth daily. No current facility-administered medications for this visit. Review of Symptoms:    CONST  No weight change. No fever, chills, sweats +fatigue   ENT No visual changes, URI sx, sore throat    CV  See HPI   RESP  No cough, or sputum, wheezing. Also see HPI   GI  No abdominal pain or change in bowel habits. No heartburn or dysphagia. No melena or rectal bleeding.   +urgency, decreased stream   MSKEL  No joint pain, swelling. No muscle pain. SKIN  No rash or lesions. NEURO  No headache, syncope, or seizure. No weakness, loss of sensation, or paresthesias. PSYCH  No low mood or depression  No anxiety. HE/LYMPH  No easy bruising, abnormal bleeding, or enlarged glands. Physical ExamPhysical Exam:    Visit Vitals  /72 (BP 1 Location: Left arm, BP Patient Position: Sitting, BP Cuff Size: Adult)   Pulse 92   Temp 98.2 °F (36.8 °C) (Temporal)   Resp 16   Ht 5' 6\" (1.676 m)   Wt 159 lb (72.1 kg)   SpO2 97%   BMI 25.66 kg/m²     Gen: NAD  HEENT:  PERRL, throat clear  Neck: no adenopathy, no thyromegaly, no JVD   Heart:  Regular,Nl S1S2,  no murmur, gallop or rub. Lungs:  clear  Abdomen:   Soft, non-tender, bowel sounds are active.    Extremities:  No edema  Pulse: symmetric  Neuro: A&O times 3, No focal neuro deficits    Cardiographics    ECG: atrial pacing (demand), PAC\"s, PVC's    Labs:   Lab Results   Component Value Date/Time    Sodium 141 02/08/2020 08:45 PM    Sodium 141 07/17/2019 12:02 PM    Sodium 143 04/19/2019 05:50 AM    Sodium 142 04/18/2019 06:07 AM    Sodium 142 04/17/2019 11:24 AM    Potassium 4.3 02/08/2020 08:45 PM    Potassium 4.2 07/17/2019 12:02 PM    Potassium 4.0 04/19/2019 05:50 AM    Potassium 4.3 04/18/2019 06:07 AM    Potassium 3.8 04/17/2019 11:24 AM    Chloride 102 02/08/2020 08:45 PM    Chloride 104 07/17/2019 12:02 PM    Chloride 108 04/19/2019 05:50 AM    Chloride 107 04/18/2019 06:07 AM    Chloride 105 04/17/2019 11:24 AM    CO2 27 02/08/2020 08:45 PM    CO2 22 07/17/2019 12:02 PM    CO2 26 04/19/2019 05:50 AM    CO2 27 04/18/2019 06:07 AM    CO2 26 04/17/2019 11:24 AM    Anion gap 12 02/08/2020 08:45 PM    Anion gap 9 04/19/2019 05:50 AM    Anion gap 8 04/18/2019 06:07 AM    Anion gap 11 04/17/2019 11:24 AM    Anion gap 10 10/25/2017 01:06 PM    Glucose 132 (H) 02/08/2020 08:45 PM    Glucose 147 (H) 07/17/2019 12:02 PM    Glucose 126 (H) 04/19/2019 05:50 AM    Glucose 127 (H) 04/18/2019 06:07 AM    Glucose 126 (H) 04/17/2019 11:24 AM    BUN 22 (H) 02/08/2020 08:45 PM    BUN 14 07/17/2019 12:02 PM    BUN 10 04/19/2019 05:50 AM    BUN 13 04/18/2019 06:07 AM    BUN 19 04/17/2019 11:24 AM    Creatinine 1.19 02/08/2020 08:45 PM    Creatinine 0.86 07/17/2019 12:02 PM    Creatinine 0.78 04/19/2019 05:50 AM    Creatinine 0.76 04/18/2019 06:07 AM    Creatinine 0.90 04/17/2019 11:24 AM    BUN/Creatinine ratio 18 02/08/2020 08:45 PM    BUN/Creatinine ratio 16 07/17/2019 12:02 PM    BUN/Creatinine ratio 13 04/19/2019 05:50 AM    BUN/Creatinine ratio 17 04/18/2019 06:07 AM    BUN/Creatinine ratio 21 (H) 04/17/2019 11:24 AM    GFR est AA >60 02/08/2020 08:45 PM    GFR est AA 91 07/17/2019 12:02 PM    GFR est AA >60 04/19/2019 05:50 AM    GFR est AA >60 04/18/2019 06:07 AM    GFR est AA >60 04/17/2019 11:24 AM    GFR est non-AA 58 (L) 02/08/2020 08:45 PM    GFR est non-AA 79 07/17/2019 12:02 PM    GFR est non-AA >60 04/19/2019 05:50 AM    GFR est non-AA >60 04/18/2019 06:07 AM    GFR est non-AA >60 04/17/2019 11:24 AM    Calcium 9.3 02/08/2020 08:45 PM    Calcium 9.0 07/17/2019 12:02 PM    Calcium 8.1 (L) 04/19/2019 05:50 AM    Calcium 7.7 (L) 04/18/2019 06:07 AM    Calcium 8.2 (L) 04/17/2019 11:24 AM    Bilirubin, total 0.4 02/08/2020 08:45 PM    Bilirubin, total 0.4 07/17/2019 12:02 PM    Bilirubin, total 0.6 04/17/2019 11:24 AM    Alk. phosphatase 74 02/08/2020 08:45 PM    Alk. phosphatase 64 07/17/2019 12:02 PM    Alk. phosphatase 67 04/17/2019 11:24 AM    Protein, total 8.5 (H) 02/08/2020 08:45 PM    Protein, total 7.4 07/17/2019 12:02 PM    Protein, total 7.1 04/17/2019 11:24 AM    Albumin 4.0 02/08/2020 08:45 PM    Albumin 4.0 07/17/2019 12:02 PM    Albumin 3.4 (L) 04/17/2019 11:24 AM    Globulin 4.5 (H) 02/08/2020 08:45 PM    Globulin 3.7 04/17/2019 11:24 AM    A-G Ratio 0.9 (L) 02/08/2020 08:45 PM    A-G Ratio 1.2 07/17/2019 12:02 PM    A-G Ratio 0.9 (L) 04/17/2019 11:24 AM    ALT (SGPT) 26 02/08/2020 08:45 PM    ALT (SGPT) 15 07/17/2019 12:02 PM    ALT (SGPT) 24 04/17/2019 11:24 AM     No results found for: CPK, CPKX, CPX  No results found for: CHOL, CHOLX, CHLST, CHOLV, 505186, HDL, HDLP, LDL, LDLC, DLDLP, TGLX, TRIGL, TRIGP, CHHD, CHHDX  No results found for this or any previous visit. Assessment:         Patient Active Problem List    Diagnosis Date Noted    Epigastric pain 10/14/2020    Foreign body in esophagus 02/08/2020    SSS (sick [facility] syndrome) ([facility] 07/19/2019    PAT (paroxysmal atrial tachycardia) (HCC)     Hypertension     Dyslipidemia     GI bleed 04/17/2019    OA (osteoarthritis) 04/17/2019    GERD (gastroesophageal reflux disease) 04/17/2019    HTN (hypertension) 04/17/2019    Prostate CA ([facility] 04/17/2019      No specific CV sx or complaints, other than ongoing fatigue. Low B12 and receiving shots. PPM followed by Dr. Michael Emmanuel.  Recent labs per Dr. Mitch Trujillo (?TFT's as part of this). Not as active as previously (COVID related) but still tries to walk, etc.  ?rate responsiveness of PPM previously changed. Recent PPM check scanned in chart--few PAT episodes. Recent EGD with atrophic gastritis. Plan:     Doing well with no adverse cardiac symptoms.    Ongoing fatigue, B12 def,other w/u neg  Urinary/prostate sx, hx prostate ca, no recent check. Has rx for flowmax and proscar but not taking--he will discuss with PCP  PPM checks per Dr Shabana Juárez and labs followed by PCP. Continue current care and f/u in 6 months.     Sol Boles MD

## 2021-03-12 ENCOUNTER — OFFICE VISIT (OUTPATIENT)
Dept: CARDIOLOGY CLINIC | Age: 86
End: 2021-03-12

## 2021-03-12 DIAGNOSIS — Z95.0 CARDIAC PACEMAKER IN SITU: Primary | ICD-10-CM

## 2021-03-12 DIAGNOSIS — I49.5 SSS (SICK SINUS SYNDROME) (HCC): ICD-10-CM

## 2021-03-29 ENCOUNTER — OFFICE VISIT (OUTPATIENT)
Dept: CARDIOLOGY CLINIC | Age: 86
End: 2021-03-29

## 2021-03-29 DIAGNOSIS — Z95.0 CARDIAC PACEMAKER IN SITU: Primary | ICD-10-CM

## 2021-03-29 DIAGNOSIS — I49.5 SSS (SICK SINUS SYNDROME) (HCC): ICD-10-CM

## 2021-04-05 ENCOUNTER — TELEPHONE (OUTPATIENT)
Dept: CARDIOLOGY CLINIC | Age: 86
End: 2021-04-05

## 2021-04-05 NOTE — TELEPHONE ENCOUNTER
Left voice mail for patient to call back and schedule appointment with Dr. Kristin Vazquez for sometime in the next month. Dr. Kristin Vazquez wants to discuss episodes on recent remote transmission.

## 2021-04-27 ENCOUNTER — OFFICE VISIT (OUTPATIENT)
Dept: CARDIOLOGY CLINIC | Age: 86
End: 2021-04-27
Payer: MEDICARE

## 2021-04-27 VITALS
BODY MASS INDEX: 24.99 KG/M2 | RESPIRATION RATE: 18 BRPM | DIASTOLIC BLOOD PRESSURE: 96 MMHG | HEART RATE: 75 BPM | SYSTOLIC BLOOD PRESSURE: 154 MMHG | OXYGEN SATURATION: 95 % | HEIGHT: 66 IN | WEIGHT: 155.5 LBS

## 2021-04-27 DIAGNOSIS — I48.0 PAROXYSMAL ATRIAL FIBRILLATION (HCC): ICD-10-CM

## 2021-04-27 DIAGNOSIS — I10 ESSENTIAL HYPERTENSION: ICD-10-CM

## 2021-04-27 DIAGNOSIS — I49.5 SSS (SICK SINUS SYNDROME) (HCC): Primary | ICD-10-CM

## 2021-04-27 DIAGNOSIS — R53.83 FATIGUE, UNSPECIFIED TYPE: ICD-10-CM

## 2021-04-27 DIAGNOSIS — I44.1 AV BLOCK, MOBITZ II: ICD-10-CM

## 2021-04-27 PROCEDURE — G8419 CALC BMI OUT NRM PARAM NOF/U: HCPCS | Performed by: INTERNAL MEDICINE

## 2021-04-27 PROCEDURE — 1101F PT FALLS ASSESS-DOCD LE1/YR: CPT | Performed by: INTERNAL MEDICINE

## 2021-04-27 PROCEDURE — G8510 SCR DEP NEG, NO PLAN REQD: HCPCS | Performed by: INTERNAL MEDICINE

## 2021-04-27 PROCEDURE — G8536 NO DOC ELDER MAL SCRN: HCPCS | Performed by: INTERNAL MEDICINE

## 2021-04-27 PROCEDURE — 99214 OFFICE O/P EST MOD 30 MIN: CPT | Performed by: INTERNAL MEDICINE

## 2021-04-27 PROCEDURE — G8427 DOCREV CUR MEDS BY ELIG CLIN: HCPCS | Performed by: INTERNAL MEDICINE

## 2021-04-27 RX ORDER — DONEPEZIL HYDROCHLORIDE 5 MG/1
TABLET, FILM COATED ORAL
COMMUNITY
Start: 2021-04-26 | End: 2022-01-01

## 2021-04-27 NOTE — LETTER
4/27/2021 Patient: Vilma Anne YOB: 1932 Date of Visit: 4/27/2021 Elvia Pfeiffer MD 
[address] [facility] [id number] Via Fax: [phone] Dear Elvia Pfeiffer MD, Thank you for referring Mr. Jaylen Dumas to [address] [place] [address] for evaluation. My notes for this consultation are attached. If you have questions, please do not hesitate to call me. I look forward to following your patient along with you. Sincerely, Khalif Carrillo MD

## 2021-04-27 NOTE — PROGRESS NOTES
Subjective:      Trinity Kruger is a 80 y.o. male is here for EP consult. Has chronic fatigue/sob. He was asked to present for AF noted on his device. Patient Active Problem List    Diagnosis Date Noted    Epigastric pain 10/14/2020    Foreign body in esophagus 02/08/2020    SSS (sick [facility] syndrome) ([facility] 07/19/2019    PAT (paroxysmal atrial tachycardia) ([facility]     Hypertension     Dyslipidemia     GI bleed 04/17/2019    OA (osteoarthritis) 04/17/2019    GERD (gastroesophageal reflux disease) 04/17/2019    HTN (hypertension) 04/17/2019    Prostate CA ([facility] 04/17/2019      Orin Beth MD  Past Medical History:   Diagnosis Date    Arthritis     Dyslipidemia     GERD (gastroesophageal reflux disease)     GI bleed     History of prostate surgery     Hypertension     PAT (paroxysmal atrial tachycardia) (HCC)     Prostate enlargement       Past Surgical History:   Procedure Laterality Date    HX COLONOSCOPY  2004    HX ORTHOPAEDIC      multiple shoulder procedures    HX PROSTATECTOMY      HX UROLOGICAL      [place] INS NEW/RPLCMT PRM PM W/TRANSV ELTRD ATRIAL&VENT N/A 7/19/2019    INSERT PPM DUAL performed by Radha Gonzalez MD at [facility] CARDIAC CATH LAB     No Known Allergies   Family History   Problem Relation Age of Onset    No Known Problems Mother     negative for cardiac disease  Social History     Socioeconomic History    Marital status:      Spouse name: Not on file    Number of children: Not on file    Years of education: Not on file    Highest education level: Not on file   Tobacco Use    Smoking status: Former Smoker    Smokeless tobacco: Never Used    Tobacco comment: quit 30 years ago   Substance and Sexual Activity    Alcohol use:  Yes     Alcohol/week: 14.0 standard drinks     Types: 14 Shots of liquor per week     Comment: Per week     Drug use: No   Other Topics Concern     Current Outpatient Medications   Medication Sig    donepeziL (ARICEPT) 5 mg tablet     cyanocobalamin (VITAMIN B12) 1,000 mcg/mL injection 1,000 mcg by IntraMUSCular route every month.  vit C/E/Zn/coppr/lutein/zeaxan (PRESERVISION AREDS-2 PO) Take 1 Tab by mouth daily.  finasteride (PROSCAR) 5 mg tablet Take 5 mg by mouth daily.  tamsulosin (FLOMAX) 0.4 mg capsule Take 0.4 mg by mouth daily.  lovastatin (MEVACOR) 20 mg tablet Take 20 mg by mouth nightly.  pantoprazole (PROTONIX) 40 mg tablet Take 40 mg by mouth daily. No current facility-administered medications for this visit. Vitals:    04/27/21 1514 04/27/21 1529   BP: (!) 148/90 (!) 154/96   Pulse: 75    Resp: 18    SpO2: 95%    Weight: 155 lb 8 oz (70.5 kg)    Height: 5' 6\" (1.676 m)        I have reviewed the nurses notes, vitals, problem list, allergy list, medical history, family, social history and medications. Review of Symptoms:    General: Pt denies excessive weight gain or loss. Pt is able to conduct ADL's  HEENT: Denies blurred vision, headaches, hearing loss, epistaxis and difficulty swallowing. Respiratory: Denies cough, congestion, shortness of breath, RANGEL, wheezing or stridor. Cardiovascular: Denies precordial pain, palpitations, edema or PND  Gastrointestinal: Denies poor appetite, indigestion, abdominal pain or blood in stool  Genitourinary: Denies hematuria, dysuria, increased urinary frequency  Musculoskeletal: Denies joint pain or swelling from muscles or joints  Neurologic: Denies tremor, paresthesias, headache, or sensory motor disturbance  Psychiatric: Denies confusion, insomnia, depression  Integumentray: Denies rash, itching or ulcers. Hematologic: Denies easy bruising, bleeding    Physical Exam:      General: Well developed, in no acute distress. HEENT: Eyes - PERRL, no jvd  Heart:  Normal S1/S2 negative S3 or S4. Regular, no murmur, gallop or rub. Respiratory: Clear bilaterally x 4, no wheezing or rales  Abdomen:   Soft, non-tender, bowel sounds are active.    Extremities:  No edema, normal cap refill, no cyanosis. Musculoskeletal: No clubbing  Neuro: A&Ox3, speech clear, gait stable. Skin: Skin color is normal. No rashes or lesions. Non diaphoretic, no ulcers or subcutaneous nodule  Vascular: 2+ pulses symmetric in all extremities  Psych - judgement intact and orientation is wnl     Cardiographics    Pacer - A paced 18%, v paced 17%, PAF less than 0.1%    Results for orders placed or performed during the hospital encounter of 02/08/20   EKG, 12 LEAD, INITIAL   Result Value Ref Range    Ventricular Rate 60 BPM    Atrial Rate 60 BPM    P-R Interval 246 ms    QRS Duration 106 ms    Q-T Interval 418 ms    QTC Calculation (Bezet) 418 ms    Calculated P Axis 45 degrees    Calculated R Axis -29 degrees    Calculated T Axis -6 degrees    Diagnosis       Suspect unspecified pacemaker failure  Sinus rhythm with 1st degree AV block  Moderate voltage criteria for LVH, may be normal variant  Cannot rule out Septal infarct , age undetermined  Abnormal ECG  When compared with ECG of 18-APR-2019 17:57,  Minimal criteria for Septal infarct are now present  Confirmed by Magda Cerda MD, --- ([id number]) on 2/9/2020 7:12:47 AM           Lab Results   Component Value Date/Time    WBC 8.2 02/08/2020 08:45 PM    HGB 16.4 02/08/2020 08:45 PM    HCT 50.3 02/08/2020 08:45 PM    PLATELET 048 47/11/7345 08:45 PM    MCV 96.0 02/08/2020 08:45 PM      Lab Results   Component Value Date/Time    Sodium 141 02/08/2020 08:45 PM    Potassium 4.3 02/08/2020 08:45 PM    Chloride 102 02/08/2020 08:45 PM    CO2 27 02/08/2020 08:45 PM    Anion gap 12 02/08/2020 08:45 PM    Glucose 132 (H) 02/08/2020 08:45 PM    BUN 22 (H) 02/08/2020 08:45 PM    Creatinine 1.19 02/08/2020 08:45 PM    BUN/Creatinine ratio 18 02/08/2020 08:45 PM    GFR est AA >60 02/08/2020 08:45 PM    GFR est non-AA 58 (L) 02/08/2020 08:45 PM    Calcium 9.3 02/08/2020 08:45 PM    Bilirubin, total 0.4 02/08/2020 08:45 PM    Alk.  phosphatase 74 02/08/2020 08:45 PM Protein, total 8.5 (H) 02/08/2020 08:45 PM    Albumin 4.0 02/08/2020 08:45 PM    Globulin 4.5 (H) 02/08/2020 08:45 PM    A-G Ratio 0.9 (L) 02/08/2020 08:45 PM    ALT (SGPT) 26 02/08/2020 08:45 PM         Assessment:     Assessment:        ICD-10-CM ICD-9-CM    1. SSS (sick sinus syndrome) (HCC)  I49.5 427.81    2. Essential hypertension  I10 401.9    3. AV block, Mobitz II  I44.1 426.12    4. Fatigue, unspecified type  R53.83 780.79    5. Paroxysmal atrial fibrillation (HCC)  I48.0 427.31      No orders of the defined types were placed in this encounter. Plan:   Jo Daniels is having chronic fatigue/sob. He is A paced 18% for sick sinus and V paced 17% for mobitz II heart block. He is having short asymptomatic PAF (less than 2 mins at a time). We discussed options - he had a GI bleed on coumadin vs watchman. In light of his other co-morbidities and his AF burden less than 0.1%, we have decided not proceed with any intervention. We will cont to watch remotely via the device clinic. Cont med rx for hyperlipidemia. F/u in one year    Continue medical management for sss, mobitz ii heart block and af. Thank you for allowing me to participate in Jo Daniels 's care. Javy Ponce MD, [facility], [facility]    On this date 04/27/2021 I have spent 55 minutes reviewing previous notes, test results and face to face with the patient discussing the diagnosis and importance of compliance with the treatment plan as well as documenting on the day of the visit.

## 2021-04-27 NOTE — PROGRESS NOTES
Chief Complaint   Patient presents with    Irregular Heart Beat     Pt c/o fatigue. 1. Have you been to the ER, urgent care clinic since your last visit? Hospitalized since your last visit? No    2. Have you seen or consulted any other health care providers outside of the [address] [place] [address] since your last visit? Include any pap smears or colon screening.  No

## 2021-05-24 ENCOUNTER — OFFICE VISIT (OUTPATIENT)
Dept: CARDIOLOGY CLINIC | Age: 86
End: 2021-05-24
Payer: MEDICARE

## 2021-05-24 DIAGNOSIS — Z95.0 CARDIAC PACEMAKER IN SITU: Primary | ICD-10-CM

## 2021-05-24 DIAGNOSIS — I44.1 AV BLOCK, MOBITZ II: ICD-10-CM

## 2021-05-24 PROCEDURE — 93296 REM INTERROG EVL PM/IDS: CPT | Performed by: INTERNAL MEDICINE

## 2021-05-24 PROCEDURE — 93294 REM INTERROG EVL PM/LDLS PM: CPT | Performed by: INTERNAL MEDICINE

## 2021-06-14 ENCOUNTER — OFFICE VISIT (OUTPATIENT)
Dept: SURGERY | Age: 86
End: 2021-06-14
Payer: MEDICARE

## 2021-06-14 VITALS
BODY MASS INDEX: 24.75 KG/M2 | HEIGHT: 66 IN | SYSTOLIC BLOOD PRESSURE: 148 MMHG | RESPIRATION RATE: 18 BRPM | OXYGEN SATURATION: 93 % | HEART RATE: 87 BPM | DIASTOLIC BLOOD PRESSURE: 98 MMHG | WEIGHT: 154 LBS

## 2021-06-14 DIAGNOSIS — L72.0 INCLUSION CYST: Primary | ICD-10-CM

## 2021-06-14 PROCEDURE — G8536 NO DOC ELDER MAL SCRN: HCPCS | Performed by: SURGERY

## 2021-06-14 PROCEDURE — G8432 DEP SCR NOT DOC, RNG: HCPCS | Performed by: SURGERY

## 2021-06-14 PROCEDURE — 99213 OFFICE O/P EST LOW 20 MIN: CPT | Performed by: SURGERY

## 2021-06-14 PROCEDURE — G8420 CALC BMI NORM PARAMETERS: HCPCS | Performed by: SURGERY

## 2021-06-14 PROCEDURE — 1101F PT FALLS ASSESS-DOCD LE1/YR: CPT | Performed by: SURGERY

## 2021-06-14 PROCEDURE — G8427 DOCREV CUR MEDS BY ELIG CLIN: HCPCS | Performed by: SURGERY

## 2021-06-14 NOTE — PROGRESS NOTES
Marden Krabbe is a 80 y.o. male who presents today with the following:  Chief Complaint   Patient presents with    Skin Problem       HPI    69-year-old male who is known to me who presents as a referral from Dr. Henok Perez after having an I&D of an inclusion cyst on his neck about 4 to 5 weeks ago. He apparently has had a history of having boils in the past but it has been years since he has had his most recent one. He developed an area on his left posterior neck and was seen and had it incised and drained. He states is decreased dramatically in size but has not entirely resolved. He does have some itching at the site but no real pain. He has had no drainage for at least the last 3 to 4 weeks. Past Medical History:   Diagnosis Date    Arthritis     Dyslipidemia     GERD (gastroesophageal reflux disease)     GI bleed     History of prostate surgery     Hypertension     PAT (paroxysmal atrial tachycardia) (HCC)     Prostate enlargement        Past Surgical History:   Procedure Laterality Date    HX COLONOSCOPY  2004    HX ORTHOPAEDIC      multiple shoulder procedures    HX PROSTATECTOMY      HX UROLOGICAL      [place] INS NEW/RPLCMT PRM PM W/TRANSV ELTRD ATRIAL&VENT N/A 7/19/2019    INSERT PPM DUAL performed by Violeta Duran MD at [facility] CARDIAC CATH LAB       Social History     Socioeconomic History    Marital status:      Spouse name: Not on file    Number of children: Not on file    Years of education: Not on file    Highest education level: Not on file   Occupational History    Not on file   Tobacco Use    Smoking status: Former Smoker    Smokeless tobacco: Never Used    Tobacco comment: quit 30 years ago   Substance and Sexual Activity    Alcohol use:  Yes     Alcohol/week: 14.0 standard drinks     Types: 14 Shots of liquor per week     Comment: Per week     Drug use: No    Sexual activity: Not on file   Other Topics Concern     Service Not Asked    Blood Transfusions Not Asked    Caffeine Concern Not Asked    Occupational Exposure Not Asked    Hobby Hazards Not Asked    Sleep Concern Not Asked    Stress Concern Not Asked    Weight Concern Not Asked    Special Diet Not Asked    Back Care Not Asked    Exercise Not Asked    Bike Helmet Not Asked   [address] Not Asked    Self-Exams Not Asked   Social History Narrative    Not on file     Social Determinants of Health     Financial Resource Strain:     Difficulty of Paying Living Expenses:    Food Insecurity:     Worried About Running Out of Food in the Last Year:     [address] in the Last Year:    Transportation Needs:     Lack of Transportation (Medical):  Lack of Transportation (Non-Medical):    Physical Activity:     Days of Exercise per Week:     Minutes of Exercise per Session:    Stress:     Feeling of Stress :    Social Connections:     Frequency of Communication with Friends and Family:     Frequency of Social Gatherings with Friends and Family:     Attends Christianity Services:     Active Member of Clubs or Organizations:     Attends Club or Organization Meetings:     Marital Status:    Intimate Partner Violence:     Fear of Current or Ex-Partner:     Emotionally Abused:     Physically Abused:     Sexually Abused:        Family History   Problem Relation Age of Onset    No Known Problems Mother        No Known Allergies    Current Outpatient Medications   Medication Sig    donepeziL (ARICEPT) 5 mg tablet     finasteride (PROSCAR) 5 mg tablet Take 5 mg by mouth daily.  cyanocobalamin (VITAMIN B12) 1,000 mcg/mL injection 1,000 mcg by IntraMUSCular route every month.  tamsulosin (FLOMAX) 0.4 mg capsule Take 0.4 mg by mouth daily.  vit C/E/Zn/coppr/lutein/zeaxan (PRESERVISION AREDS-2 PO) Take 1 Tab by mouth daily.  lovastatin (MEVACOR) 20 mg tablet Take 20 mg by mouth nightly.  pantoprazole (PROTONIX) 40 mg tablet Take 40 mg by mouth daily.      No current facility-administered medications for this visit. The above histories, medications and allergies have been reviewed. ROS    Visit Vitals  BP (!) 148/98 (BP 1 Location: Left upper arm, BP Patient Position: Sitting)   Pulse 87   Resp 18   Ht 5' 6\" (1.676 m)   Wt 154 lb (69.9 kg)   SpO2 93%   BMI 24.86 kg/m²     Physical Exam  Constitutional:       Appearance: Normal appearance. Skin:     Comments: Posterior neck in the crease has a 1 cm area of thickening with no real induration and no fluctuance consistent with an inclusion cyst.   Neurological:      Mental Status: He is alert. 1. Inclusion cyst  Does not appear to be actively inflamed at this point. We have discussed the options including elective excision versus observation. We would like to observe for 4 weeks. If it continues to cause him any kind of symptoms and discomfort and would like to have it removed we can arrange for this. If it is a little smaller we can take care of this here in the office. If it cyst size or larger he may require OR excision. If he develops swelling or increasing in size of this between now and then he should call us or we can do a more formal incision and drainage. Follow-up and Dispositions    · Return in about 4 weeks (around 7/12/2021) for recheck.          Carina Morejon MD

## 2021-06-14 NOTE — PROGRESS NOTES
1. Have you been to the ER, urgent care clinic since your last visit? Hospitalized since your last visit? No    2. Have you seen or consulted any other health care providers outside of the [address] [place] [address] since your last visit? Include any pap smears or colon screening.  Yes When: 6/4/21, dr Hanna Lighter

## 2021-06-14 NOTE — PATIENT INSTRUCTIONS
Epidermoid Cyst: Care Instructions Your Care Instructions An epidermoid (say \"io-xkh-EIX-ara\") cyst is a lump just under the skin. These cysts can form when a hair follicle becomes blocked. They are common in acne and may occur on the face, neck, back, and genitals. However, they can form anywhere on the body. These cysts are not cancer and do not lead to cancer. They tend not to hurt, but they can sometimes become swollen and painful. They also may break open (rupture) and cause scarring. These cysts sometimes do not cause problems and may not need treatment. If you have a cyst that is swollen and hurts, your doctor may inject it with a medicine to help it heal. But it is more likely that a painful cyst will need to be removed. Your doctor will give you a shot of numbing medicine and cut into the cyst to drain it or remove it. This makes the symptoms go away. Follow-up care is a key part of your treatment and safety. Be sure to make and go to all appointments, and call your doctor if you are having problems. It's also a good idea to know your test results and keep a list of the medicines you take. How can you care for yourself at home? · Do not squeeze the cyst or poke it with a needle to open it. This can cause swelling, redness, and infection. · Always have a doctor look at any new lumps you get to make sure that they are not serious. When should you call for help? Watch closely for changes in your health, and be sure to contact your doctor if: 
  · You have a fever, redness, or swelling after you get a shot of medicine in the cyst.  
  · You see or feel a new lump on your skin. Where can you learn more? Go to http://www.gray.com/ Enter D672 in the search box to learn more about \"Epidermoid Cyst: Care Instructions. \" Current as of: July 2, 2020               Content Version: 12.8 © 4432-0807 [employer].   
Care instructions adapted under license by [employer] (which disclaims liability or warranty for this information). If you have questions about a medical condition or this instruction, always ask your healthcare professional. [address] any warranty or liability for your use of this information.

## 2021-07-03 ENCOUNTER — TRANSCRIBE ORDER (OUTPATIENT)
Dept: SCHEDULING | Age: 86
End: 2021-07-03

## 2021-07-03 DIAGNOSIS — F03.90 UNSPECIFIED DEMENTIA WITHOUT BEHAVIORAL DISTURBANCE: Primary | ICD-10-CM

## 2021-07-12 ENCOUNTER — HOSPITAL ENCOUNTER (OUTPATIENT)
Dept: CT IMAGING | Age: 86
Discharge: HOME | End: 2021-07-12
Attending: INTERNAL MEDICINE
Payer: MEDICARE

## 2021-07-12 DIAGNOSIS — F03.90 UNSPECIFIED DEMENTIA WITHOUT BEHAVIORAL DISTURBANCE: ICD-10-CM

## 2021-07-12 PROCEDURE — 70450 CT HEAD/BRAIN W/O DYE: CPT

## 2021-07-21 ENCOUNTER — TRANSCRIBE ORDER (OUTPATIENT)
Dept: SCHEDULING | Age: 86
End: 2021-07-21

## 2021-07-21 ENCOUNTER — OFFICE VISIT (OUTPATIENT)
Dept: SURGERY | Age: 86
End: 2021-07-21
Payer: MEDICARE

## 2021-07-21 VITALS
WEIGHT: 154 LBS | BODY MASS INDEX: 24.75 KG/M2 | DIASTOLIC BLOOD PRESSURE: 121 MMHG | SYSTOLIC BLOOD PRESSURE: 162 MMHG | HEART RATE: 89 BPM | HEIGHT: 66 IN

## 2021-07-21 DIAGNOSIS — L72.0 INCLUSION CYST: Primary | ICD-10-CM

## 2021-07-21 DIAGNOSIS — Z91.81 HISTORY OF FALL: Primary | ICD-10-CM

## 2021-07-21 PROCEDURE — 1101F PT FALLS ASSESS-DOCD LE1/YR: CPT | Performed by: SURGERY

## 2021-07-21 PROCEDURE — 99212 OFFICE O/P EST SF 10 MIN: CPT | Performed by: SURGERY

## 2021-07-21 PROCEDURE — G8420 CALC BMI NORM PARAMETERS: HCPCS | Performed by: SURGERY

## 2021-07-21 PROCEDURE — G8427 DOCREV CUR MEDS BY ELIG CLIN: HCPCS | Performed by: SURGERY

## 2021-07-21 PROCEDURE — G8432 DEP SCR NOT DOC, RNG: HCPCS | Performed by: SURGERY

## 2021-07-21 PROCEDURE — G8536 NO DOC ELDER MAL SCRN: HCPCS | Performed by: SURGERY

## 2021-07-21 NOTE — PROGRESS NOTES
Delilah Brock is a 80 y.o. male who presents today with the following:  Chief Complaint   Patient presents with    Wound Check       HPI    He presents in follow-up for evaluation of the area of inclusion cyst on his left posterior neck. He has had no problems. He barely can feel the area. He is having no discomfort. Past Medical History:   Diagnosis Date    Arthritis     Dyslipidemia     GERD (gastroesophageal reflux disease)     GI bleed     History of prostate surgery     Hypertension     PAT (paroxysmal atrial tachycardia) (HCC)     Prostate enlargement        Past Surgical History:   Procedure Laterality Date    HX COLONOSCOPY  2004    HX ORTHOPAEDIC      multiple shoulder procedures    HX PROSTATECTOMY      HX UROLOGICAL      [place] INS NEW/RPLCMT PRM PM W/TRANSV ELTRD ATRIAL&VENT N/A 7/19/2019    INSERT PPM DUAL performed by Gumaro Taylor MD at [facility] CARDIAC CATH LAB       Social History     Socioeconomic History    Marital status:      Spouse name: Not on file    Number of children: Not on file    Years of education: Not on file    Highest education level: Not on file   Occupational History    Not on file   Tobacco Use    Smoking status: Former Smoker    Smokeless tobacco: Never Used    Tobacco comment: quit 30 years ago   Substance and Sexual Activity    Alcohol use:  Yes     Alcohol/week: 14.0 standard drinks     Types: 14 Shots of liquor per week     Comment: Per week     Drug use: No    Sexual activity: Not on file   Other Topics Concern     Service Not Asked    Blood Transfusions Not Asked    Caffeine Concern Not Asked    Occupational Exposure Not Asked    Hobby Hazards Not Asked    Sleep Concern Not Asked    Stress Concern Not Asked    Weight Concern Not Asked    Special Diet Not Asked    Back Care Not Asked    Exercise Not Asked    Bike Helmet Not Asked   [address] Not Asked    Self-Exams Not Asked   Social History Narrative    Not on file Social Determinants of Health     Financial Resource Strain:     Difficulty of Paying Living Expenses:    Food Insecurity:     Worried About Running Out of Food in the Last Year:     [address] in the Last Year:    Transportation Needs:     Lack of Transportation (Medical):  Lack of Transportation (Non-Medical):    Physical Activity:     Days of Exercise per Week:     Minutes of Exercise per Session:    Stress:     Feeling of Stress :    Social Connections:     Frequency of Communication with Friends and Family:     Frequency of Social Gatherings with Friends and Family:     Attends [employer] Services:     Active Member of Clubs or Organizations:     Attends Club or Organization Meetings:     Marital Status:    Intimate Partner Violence:     Fear of Current or Ex-Partner:     Emotionally Abused:     Physically Abused:     Sexually Abused:        Family History   Problem Relation Age of Onset    No Known Problems Mother        No Known Allergies    Current Outpatient Medications   Medication Sig    donepeziL (ARICEPT) 5 mg tablet     finasteride (PROSCAR) 5 mg tablet Take 5 mg by mouth daily.  cyanocobalamin (VITAMIN B12) 1,000 mcg/mL injection 1,000 mcg by IntraMUSCular route every month.  tamsulosin (FLOMAX) 0.4 mg capsule Take 0.4 mg by mouth daily.  vit C/E/Zn/coppr/lutein/zeaxan (PRESERVISION AREDS-2 PO) Take 1 Tab by mouth daily.  lovastatin (MEVACOR) 20 mg tablet Take 20 mg by mouth nightly.  pantoprazole (PROTONIX) 40 mg tablet Take 40 mg by mouth daily. No current facility-administered medications for this visit. The above histories, medications and allergies have been reviewed.     ROS    Visit Vitals  BP (!) 162/121 (BP 1 Location: Left upper arm, BP Patient Position: Sitting)   Pulse 89   Ht 5' 6\" (1.676 m)   Wt 154 lb (69.9 kg)   BMI 24.86 kg/m²     Physical Exam  Neck:      Comments: No palpable residual abnormalities in the area of the inclusion cyst        1. Inclusion cyst  He would like to observe at this point which I believe is a correct answer. He has had no new problems. The area of concern has resolved. He is aware that there is potential for recurrence. If this occurs I have encouraged him to call our office directly. Follow-up and Dispositions    · Return if symptoms worsen or fail to improve.          Kylah Hidalgo MD

## 2021-07-21 NOTE — LETTER
7/21/2021    Patient: Radha Granger   YOB: 1932   Date of Visit: 7/21/2021     Jerrod Holt MD  [facility] [phone] [phone]  Via Fax: [phone]    Dear Jerrod Holt MD,      Thank you for referring Mr. Charbel Lazaro to [address] [place] [address] for evaluation. My notes for this consultation are attached. If you have questions, please do not hesitate to call me. I look forward to following your patient along with you.       Sincerely,    Miquel Corcoran MD

## 2021-07-21 NOTE — PATIENT INSTRUCTIONS
Epidermoid Cyst: Care Instructions  Your Care Instructions  An epidermoid (say \"py-cel-AWI-ara\") cyst is a lump just under the skin. These cysts can form when a hair follicle becomes blocked. They are common in acne and may occur on the face, neck, back, and genitals. However, they can form anywhere on the body. These cysts are not cancer and do not lead to cancer. They tend not to hurt, but they can sometimes become swollen and painful. They also may break open (rupture) and cause scarring. These cysts sometimes do not cause problems and may not need treatment. If you have a cyst that is swollen and hurts, your doctor may inject it with a medicine to help it heal. But it is more likely that a painful cyst will need to be removed. Your doctor will give you a shot of numbing medicine and cut into the cyst to drain it or remove it. This makes the symptoms go away. Follow-up care is a key part of your treatment and safety. Be sure to make and go to all appointments, and call your doctor if you are having problems. It's also a good idea to know your test results and keep a list of the medicines you take. How can you care for yourself at home? · Do not squeeze the cyst or poke it with a needle to open it. This can cause swelling, redness, and infection. · Always have a doctor look at any new lumps you get to make sure that they are not serious. When should you call for help? Watch closely for changes in your health, and be sure to contact your doctor if:    · You have a fever, redness, or swelling after you get a shot of medicine in the cyst.     · You see or feel a new lump on your skin. Where can you learn more? Go to http://www.gray.com/  Enter U696 in the search box to learn more about \"Epidermoid Cyst: Care Instructions. \"  Current as of: July 2, 2020               Content Version: 12.8  © 1176-3209 [employer].    Care instructions adapted under license by [employer] (which disclaims liability or warranty for this information). If you have questions about a medical condition or this instruction, always ask your healthcare professional. [address] any warranty or liability for your use of this information.

## 2021-08-03 PROBLEM — I10 HTN (HYPERTENSION): Status: RESOLVED | Noted: 2019-04-17 | Resolved: 2021-08-03

## 2021-08-12 ENCOUNTER — HOSPITAL ENCOUNTER (OUTPATIENT)
Dept: CT IMAGING | Age: 86
Discharge: HOME | End: 2021-08-12
Attending: INTERNAL MEDICINE
Payer: MEDICARE

## 2021-08-12 DIAGNOSIS — Z91.81 HISTORY OF FALLING: ICD-10-CM

## 2021-08-12 PROCEDURE — 70450 CT HEAD/BRAIN W/O DYE: CPT

## 2021-08-23 ENCOUNTER — OFFICE VISIT (OUTPATIENT)
Dept: CARDIOLOGY CLINIC | Age: 86
End: 2021-08-23
Payer: MEDICARE

## 2021-08-23 DIAGNOSIS — I49.5 SSS (SICK SINUS SYNDROME) (HCC): ICD-10-CM

## 2021-08-23 DIAGNOSIS — Z95.0 CARDIAC PACEMAKER IN SITU: Primary | ICD-10-CM

## 2021-08-23 PROCEDURE — 93294 REM INTERROG EVL PM/LDLS PM: CPT | Performed by: INTERNAL MEDICINE

## 2021-08-23 PROCEDURE — 93296 REM INTERROG EVL PM/IDS: CPT | Performed by: INTERNAL MEDICINE

## 2021-09-28 NOTE — ED PROVIDER NOTES
EMERGENCY DEPARTMENT HISTORY AND PHYSICAL EXAM          Date: 9/28/2021  Patient Name: Damián Ramírez    History of Presenting Illness     Chief Complaint   Patient presents with    Urinary Catheter Problem       History Provided By: Patient and Caregiver    HPI: Damián Ramírez is a 80 y.o. male, pmhx hypertension, BPH, atrial tachycardia, high cholesterol, who presents via private auto to the ED c/o penis pain    Patient had a TURP yesterday and a Olivares catheter was placed. Since this morning he is having a lot of pain in his penis and he states it hurts when he urinates. He also notes that he is only had very small amount of output since 10 AM as that was the last time they emptied the bag. He denies any blood in the catheter as well as any fevers, chills and back pain. He does feel nauseous but that is not changed from his normal.    PCP: Orin Beth MD    Allergies: None known  Social Hx: Former tobacco, +EtOH, -Illicit Drugs; There are no other complaints, changes, or physical findings at this time. Current Outpatient Medications   Medication Sig Dispense Refill    donepeziL (ARICEPT) 5 mg tablet       finasteride (PROSCAR) 5 mg tablet Take 5 mg by mouth daily.  cyanocobalamin (VITAMIN B12) 1,000 mcg/mL injection 1,000 mcg by IntraMUSCular route every month.  tamsulosin (FLOMAX) 0.4 mg capsule Take 0.4 mg by mouth daily.  vit C/E/Zn/coppr/lutein/zeaxan (PRESERVISION AREDS-2 PO) Take 1 Tab by mouth daily.  lovastatin (MEVACOR) 20 mg tablet Take 20 mg by mouth nightly.  pantoprazole (PROTONIX) 40 mg tablet Take 40 mg by mouth daily.          Past History     Past Medical History:  Past Medical History:   Diagnosis Date    Arthritis     Dyslipidemia     GERD (gastroesophageal reflux disease)     GI bleed     History of prostate surgery     Hypertension     PAT (paroxysmal atrial tachycardia) (HCC)     Prostate enlargement        Past Surgical History:  Past Surgical History:   Procedure Laterality Date    HX COLONOSCOPY  2004    HX ORTHOPAEDIC      multiple shoulder procedures    HX PROSTATECTOMY      HX UROLOGICAL      [place] INS NEW/RPLCMT PRM PM W/TRANSV ELTRD ATRIAL&VENT N/A 7/19/2019    INSERT PPM DUAL performed by Bravo Harper MD at [facility] CARDIAC CATH LAB       Family History:  Family History   Problem Relation Age of Onset    No Known Problems Mother        Social History:  Social History     Tobacco Use    Smoking status: Former Smoker    Smokeless tobacco: Never Used    Tobacco comment: quit 30 years ago   Substance Use Topics    Alcohol use: Yes     Alcohol/week: 14.0 standard drinks     Types: 14 Shots of liquor per week     Comment: Per week     Drug use: No       Allergies:  No Known Allergies      Review of Systems   Review of Systems   Constitutional: Negative for activity change, appetite change, chills, fever and unexpected weight change. HENT: Negative for congestion. Eyes: Negative for pain and visual disturbance. Respiratory: Negative for cough and shortness of breath. Cardiovascular: Negative for chest pain. Gastrointestinal: Negative for abdominal pain, diarrhea, nausea and vomiting. Genitourinary: Positive for difficulty urinating, dysuria and penile pain. Negative for flank pain, hematuria, penile swelling and scrotal swelling. Musculoskeletal: Negative for back pain. Skin: Negative for rash. Neurological: Negative for headaches. Physical Exam   Physical Exam  Vitals and nursing note reviewed. Constitutional:       Appearance: He is well-developed. He is not diaphoretic. Comments: This is a thin, elderly male who appears flushed in acute distress secondary to pain   HENT:      Head: Normocephalic and atraumatic. Eyes:      General:         Right eye: No discharge. Left eye: No discharge. Conjunctiva/sclera: Conjunctivae normal.      Pupils: Pupils are equal, round, and reactive to light. Cardiovascular:      Rate and Rhythm: Normal rate and regular rhythm. Heart sounds: Normal heart sounds. No murmur heard. Pulmonary:      Effort: Pulmonary effort is normal. No respiratory distress. Breath sounds: Normal breath sounds. No wheezing or rales. Abdominal:      General: Bowel sounds are normal. There is no distension. Palpations: Abdomen is soft. Tenderness: There is no abdominal tenderness. There is no guarding or rebound. Genitourinary:     Comments: Olivares catheter in place with approximately 50 mL of orange urine  Musculoskeletal:         General: Normal range of motion. Cervical back: Normal range of motion and neck supple. Right lower leg: No edema. Left lower leg: No edema. Skin:     General: Skin is warm and dry. Findings: No rash. Neurological:      Mental Status: He is alert and oriented to person, place, and time. Cranial Nerves: No cranial nerve deficit. Motor: No abnormal muscle tone.       Gait: Gait normal.       Diagnostic Study Results     Labs -     Recent Results (from the past 12 hour(s))   URINALYSIS W/ REFLEX CULTURE    Collection Time: 09/28/21  3:47 PM    Specimen: Urine   Result Value Ref Range    Color YELLOW/STRAW      Appearance HAZY (A) CLEAR      Specific gravity 1.010 1.003 - 1.030      pH (UA) 5.5 5.0 - 8.0      Protein 30 (A) NEG mg/dL    Glucose Negative NEG mg/dL    Ketone Negative NEG mg/dL    Bilirubin Negative NEG      Blood LARGE (A) NEG      Urobilinogen 0.2 0.2 - 1.0 EU/dL    Nitrites Negative NEG      Leukocyte Esterase SMALL (A) NEG      WBC 0-4 0 - 4 /hpf    RBC 0-5 0 - 5 /hpf    Epithelial cells FEW FEW /lpf    Bacteria 1+ (A) NEG /hpf    UA:UC IF INDICATED CULTURE NOT INDICATED BY UA RESULT CNI      Mucus TRACE /lpf    Amorphous Crystals FEW (A) NEG      CA Oxalate crystals FEW (A) NEG      Hyaline cast 0-2 0 - 5 /lpf       Radiologic Studies -   No orders to display     CT Results  (Last 48 hours) None        CXR Results  (Last 48 hours)    None            Medical Decision Making   I am the first provider for this patient. I reviewed the vital signs, available nursing notes, past medical history, past surgical history, family history and social history. Vital Signs-Reviewed the patient's vital signs. Patient Vitals for the past 12 hrs:   Temp Pulse Resp BP SpO2   09/28/21 1345 98.2 °F (36.8 °C) 74 18 (!) 157/95 96 %       Pulse Oximetry Analysis - 96% on RA    Records Reviewed: Nursing Notes and Old Medical Records    Provider Notes (Medical Decision Making):   MDM: 80-year-old male post TURP presenting for bladder spasms and decreased urine output. Family is concerned that he has not had much in the bag. Nurse to complete bladder check to make sure that he is not retaining and that the Olivares is in place. There is no drainage of urine around the Olivares catheter and there is no evidence of ajit hematuria in his back. Patient admits he probably has not been eating and drinking as much today as he should. ED Course:   Initial assessment performed. The patients presenting problems have been discussed, and they are in agreement with the care plan formulated and outlined with them. I have encouraged them to ask questions as they arise throughout their visit. PROGRESS NOTE:  4:50 PM   Pt sitting comfortably but antsy to leave. Urinalysis reviewed and notable for 1+ bacteria with small leuk esterase and 0-4 white cells as well as calcium oxalate and amorphous crystals. I explained patient that he is not drinking enough any need to drink more today as the appearance is hazy. He is already on antibiotics preprocedure 2 days and 3 days post so I recommend they continue the antibiotic until we obtain his urine culture on Friday to determine if he needs a further course. Discharge note:  Pt re-evaluated and noted to be feeling better, ready for discharge.  Updated pt on all final lab findings. Will follow up as instructed. All questions have been answered, pt voiced understanding and agreement with plan. Specific return precautions provided as well as instructions to return to the ED should sx worsen at any time. Vital signs stable for discharge. Critical Care Time:   0      Diagnosis     Clinical Impression:   1. Bladder spasm    2. Urinary tract infection associated with indwelling urethral catheter, initial encounter ([address]        PLAN:  1. Current Discharge Medication List        2. Follow-up Information     Follow up With Specialties Details Why Contact Orin Mitchell MD Internal Medicine Schedule an appointment as soon as possible for a visit   [address] [id number] [phone]      [address] [facility] Emergency Medicine  If symptoms worsen [address] [phone]        Return to ED if worse     Disposition:  Home       Please note, this dictation was completed with [employer], the computer voice recognition software. Quite often unanticipated grammatical, syntax, homophones, and other interpretive errors are inadvertently transcribed by the computer software. Please disregard these errors. Please excuse any errors that have escaped final proof reading.

## 2021-09-28 NOTE — PROGRESS NOTES
Initial spiritual assessment in ER 8. Patient/family shared about  His medical issues. Provided empathic listening and spiritual support. Advised of  Availability.   [address] [place] [address]

## 2022-01-01 ENCOUNTER — TRANSCRIBE ORDER (OUTPATIENT)
Dept: SCHEDULING | Age: 87
End: 2022-01-01

## 2022-01-01 ENCOUNTER — APPOINTMENT (OUTPATIENT)
Dept: ULTRASOUND IMAGING | Age: 87
DRG: 286 | End: 2022-01-01
Attending: STUDENT IN AN ORGANIZED HEALTH CARE EDUCATION/TRAINING PROGRAM
Payer: MEDICARE

## 2022-01-01 ENCOUNTER — HOSPITAL ENCOUNTER (OUTPATIENT)
Dept: GENERAL RADIOLOGY | Age: 87
Discharge: HOME | End: 2022-06-20
Attending: INTERNAL MEDICINE
Payer: MEDICARE

## 2022-01-01 ENCOUNTER — HOSPITAL ENCOUNTER (OUTPATIENT)
Dept: GENERAL RADIOLOGY | Age: 87
Discharge: HOME | End: 2022-07-05
Attending: INTERNAL MEDICINE
Payer: MEDICARE

## 2022-01-01 ENCOUNTER — APPOINTMENT (OUTPATIENT)
Dept: CT IMAGING | Age: 87
DRG: 286 | End: 2022-01-01
Attending: STUDENT IN AN ORGANIZED HEALTH CARE EDUCATION/TRAINING PROGRAM
Payer: MEDICARE

## 2022-01-01 ENCOUNTER — HOSPITAL ENCOUNTER (OUTPATIENT)
Dept: CT IMAGING | Age: 87
Discharge: HOME | End: 2022-02-22
Attending: INTERNAL MEDICINE
Payer: MEDICARE

## 2022-01-01 ENCOUNTER — APPOINTMENT (OUTPATIENT)
Dept: GENERAL RADIOLOGY | Age: 87
DRG: 286 | End: 2022-01-01
Attending: NURSE PRACTITIONER
Payer: MEDICARE

## 2022-01-01 ENCOUNTER — APPOINTMENT (OUTPATIENT)
Dept: NON INVASIVE DIAGNOSTICS | Age: 87
DRG: 286 | End: 2022-01-01
Attending: STUDENT IN AN ORGANIZED HEALTH CARE EDUCATION/TRAINING PROGRAM
Payer: MEDICARE

## 2022-01-01 ENCOUNTER — OFFICE VISIT (OUTPATIENT)
Dept: CARDIOLOGY CLINIC | Age: 87
End: 2022-01-01
Payer: MEDICARE

## 2022-01-01 ENCOUNTER — HOSPITAL ENCOUNTER (INPATIENT)
Age: 87
LOS: 3 days | DRG: 286 | End: 2022-09-03
Attending: STUDENT IN AN ORGANIZED HEALTH CARE EDUCATION/TRAINING PROGRAM | Admitting: STUDENT IN AN ORGANIZED HEALTH CARE EDUCATION/TRAINING PROGRAM
Payer: MEDICARE

## 2022-01-01 ENCOUNTER — APPOINTMENT (OUTPATIENT)
Dept: GENERAL RADIOLOGY | Age: 87
DRG: 286 | End: 2022-01-01
Attending: STUDENT IN AN ORGANIZED HEALTH CARE EDUCATION/TRAINING PROGRAM
Payer: MEDICARE

## 2022-01-01 ENCOUNTER — TRANSCRIBE ORDER (OUTPATIENT)
Dept: CARDIAC REHAB | Age: 87
End: 2022-01-01

## 2022-01-01 ENCOUNTER — HOSPITAL ENCOUNTER (OUTPATIENT)
Dept: CT IMAGING | Age: 87
Discharge: HOME | End: 2022-06-17
Attending: INTERNAL MEDICINE
Payer: MEDICARE

## 2022-01-01 ENCOUNTER — HOSPITAL ENCOUNTER (OUTPATIENT)
Dept: CT IMAGING | Age: 87
Discharge: HOME | End: 2022-02-04
Attending: INTERNAL MEDICINE
Payer: MEDICARE

## 2022-01-01 VITALS
TEMPERATURE: 97.4 F | WEIGHT: 109.7 LBS | BODY MASS INDEX: 17.63 KG/M2 | HEIGHT: 66 IN | HEART RATE: 149 BPM | SYSTOLIC BLOOD PRESSURE: 105 MMHG | OXYGEN SATURATION: 93 % | DIASTOLIC BLOOD PRESSURE: 62 MMHG | RESPIRATION RATE: 18 BRPM

## 2022-01-01 DIAGNOSIS — R93.5 ABNORMAL CT OF THE ABDOMEN: ICD-10-CM

## 2022-01-01 DIAGNOSIS — Z95.0 CARDIAC PACEMAKER IN SITU: Primary | ICD-10-CM

## 2022-01-01 DIAGNOSIS — R68.81 EARLY SATIETY: ICD-10-CM

## 2022-01-01 DIAGNOSIS — R63.4 WEIGHT LOSS: ICD-10-CM

## 2022-01-01 DIAGNOSIS — R10.84 GENERALIZED ABDOMINAL PAIN: ICD-10-CM

## 2022-01-01 DIAGNOSIS — R06.09 OTHER FORMS OF DYSPNEA: Primary | ICD-10-CM

## 2022-01-01 DIAGNOSIS — R93.3 ABNORMAL UGI SERIES: ICD-10-CM

## 2022-01-01 DIAGNOSIS — I49.5 SSS (SICK SINUS SYNDROME) (HCC): ICD-10-CM

## 2022-01-01 DIAGNOSIS — R06.00 DYSPNEA: ICD-10-CM

## 2022-01-01 DIAGNOSIS — K86.1 CHRONIC PANCREATITIS (HCC): ICD-10-CM

## 2022-01-01 DIAGNOSIS — R07.89 OTHER CHEST PAIN: ICD-10-CM

## 2022-01-01 DIAGNOSIS — R93.3 ABNORMAL UGI SERIES: Primary | ICD-10-CM

## 2022-01-01 DIAGNOSIS — I50.9 CHF (CONGESTIVE HEART FAILURE) (HCC): ICD-10-CM

## 2022-01-01 DIAGNOSIS — R41.3 MEMORY LOSS: ICD-10-CM

## 2022-01-01 DIAGNOSIS — R06.02 SOB (SHORTNESS OF BREATH): Primary | ICD-10-CM

## 2022-01-01 DIAGNOSIS — R93.89 ABNORMAL CT SCAN: Primary | ICD-10-CM

## 2022-01-01 DIAGNOSIS — I50.30 DIASTOLIC HEART FAILURE, UNSPECIFIED HF CHRONICITY (HCC): Primary | ICD-10-CM

## 2022-01-01 DIAGNOSIS — R93.5 ABNORMAL CT OF THE ABDOMEN: Primary | ICD-10-CM

## 2022-01-01 DIAGNOSIS — R41.3 MEMORY LOSS: Primary | ICD-10-CM

## 2022-01-01 LAB
ALBUMIN SERPL-MCNC: 3.7 G/DL (ref 3.5–5)
ALBUMIN/GLOB SERPL: 0.8 {RATIO} (ref 1.1–2.2)
ALP SERPL-CCNC: 78 U/L (ref 45–117)
ALT SERPL-CCNC: 40 U/L (ref 12–78)
ANION GAP SERPL CALC-SCNC: 3 MMOL/L (ref 5–15)
ANION GAP SERPL CALC-SCNC: 5 MMOL/L (ref 5–15)
ANION GAP SERPL CALC-SCNC: 6 MMOL/L (ref 5–15)
AST SERPL-CCNC: 30 U/L (ref 15–37)
ATRIAL RATE: 101 BPM
BASOPHILS # BLD: 0.1 K/UL (ref 0–0.1)
BASOPHILS # BLD: 0.1 K/UL (ref 0–0.1)
BASOPHILS NFR BLD: 1 % (ref 0–1)
BASOPHILS NFR BLD: 1 % (ref 0–1)
BILIRUB SERPL-MCNC: 0.7 MG/DL (ref 0.2–1)
BNP SERPL-MCNC: ABNORMAL PG/ML
BUN SERPL-MCNC: 17 MG/DL (ref 6–20)
BUN SERPL-MCNC: 18 MG/DL (ref 6–20)
BUN SERPL-MCNC: 20 MG/DL (ref 6–20)
BUN/CREAT SERPL: 20 (ref 12–20)
BUN/CREAT SERPL: 22 (ref 12–20)
BUN/CREAT SERPL: 32 (ref 12–20)
CALCIUM SERPL-MCNC: 9.1 MG/DL (ref 8.5–10.1)
CALCIUM SERPL-MCNC: 9.4 MG/DL (ref 8.5–10.1)
CALCIUM SERPL-MCNC: 9.8 MG/DL (ref 8.5–10.1)
CALCULATED R AXIS, ECG10: -30 DEGREES
CALCULATED R AXIS, ECG10: -63 DEGREES
CALCULATED T AXIS, ECG11: -86 DEGREES
CALCULATED T AXIS, ECG11: 163 DEGREES
CHLORIDE SERPL-SCNC: 102 MMOL/L (ref 97–108)
CHLORIDE SERPL-SCNC: 103 MMOL/L (ref 97–108)
CHLORIDE SERPL-SCNC: 98 MMOL/L (ref 97–108)
CHOLEST SERPL-MCNC: 171 MG/DL
CO2 SERPL-SCNC: 34 MMOL/L (ref 21–32)
CO2 SERPL-SCNC: 35 MMOL/L (ref 21–32)
CO2 SERPL-SCNC: 36 MMOL/L (ref 21–32)
CREAT SERPL-MCNC: 0.57 MG/DL (ref 0.7–1.3)
CREAT SERPL-MCNC: 0.87 MG/DL (ref 0.7–1.3)
CREAT SERPL-MCNC: 0.9 MG/DL (ref 0.7–1.3)
DIAGNOSIS, 93000: NORMAL
DIAGNOSIS, 93000: NORMAL
DIFFERENTIAL METHOD BLD: ABNORMAL
DIFFERENTIAL METHOD BLD: ABNORMAL
ECHO AO ROOT DIAM: 2.6 CM
ECHO AO ROOT INDEX: 1.68 CM/M2
ECHO AV AREA PEAK VELOCITY: 1.6 CM2
ECHO AV AREA VTI: 1.6 CM2
ECHO AV AREA/BSA PEAK VELOCITY: 1 CM2/M2
ECHO AV AREA/BSA VTI: 1 CM2/M2
ECHO AV MEAN GRADIENT: 5 MMHG
ECHO AV MEAN VELOCITY: 1 M/S
ECHO AV PEAK GRADIENT: 7 MMHG
ECHO AV PEAK VELOCITY: 1.4 M/S
ECHO AV VELOCITY RATIO: 0.5
ECHO AV VTI: 23.8 CM
ECHO LA DIAMETER INDEX: 1.55 CM/M2
ECHO LA DIAMETER: 2.4 CM
ECHO LA TO AORTIC ROOT RATIO: 0.92
ECHO LV E' SEPTAL VELOCITY: 5 CM/S
ECHO LV EDV A4C: 85 ML
ECHO LV EDV INDEX A4C: 55 ML/M2
ECHO LV EJECTION FRACTION A4C: 33 %
ECHO LV ESV A4C: 57 ML
ECHO LV ESV INDEX A4C: 37 ML/M2
ECHO LV FRACTIONAL SHORTENING: 34 % (ref 28–44)
ECHO LV INTERNAL DIMENSION DIASTOLE INDEX: 2.45 CM/M2
ECHO LV INTERNAL DIMENSION DIASTOLIC: 3.8 CM (ref 4.2–5.9)
ECHO LV INTERNAL DIMENSION SYSTOLIC INDEX: 1.61 CM/M2
ECHO LV INTERNAL DIMENSION SYSTOLIC: 2.5 CM
ECHO LV IVSD: 0.9 CM (ref 0.6–1)
ECHO LV MASS 2D: 101.1 G (ref 88–224)
ECHO LV MASS INDEX 2D: 65.2 G/M2 (ref 49–115)
ECHO LV POSTERIOR WALL DIASTOLIC: 0.9 CM (ref 0.6–1)
ECHO LV RELATIVE WALL THICKNESS RATIO: 0.47
ECHO LVOT AREA: 3.1 CM2
ECHO LVOT AV VTI INDEX: 0.55
ECHO LVOT DIAM: 2 CM
ECHO LVOT MEAN GRADIENT: 1 MMHG
ECHO LVOT PEAK GRADIENT: 2 MMHG
ECHO LVOT PEAK VELOCITY: 0.7 M/S
ECHO LVOT STROKE VOLUME INDEX: 26.5 ML/M2
ECHO LVOT SV: 41.1 ML
ECHO LVOT VTI: 13.1 CM
ECHO MV AREA VTI: 3.1 CM2
ECHO MV E DECELERATION TIME (DT): 69.3 MS
ECHO MV E VELOCITY: 1.3 M/S
ECHO MV E/E' SEPTAL: 26
ECHO MV LVOT VTI INDEX: 1
ECHO MV MAX VELOCITY: 1.3 M/S
ECHO MV MEAN GRADIENT: 4 MMHG
ECHO MV MEAN VELOCITY: 0.9 M/S
ECHO MV PEAK GRADIENT: 7 MMHG
ECHO MV VTI: 13.1 CM
ECHO PV MAX VELOCITY: 0.6 M/S
ECHO PV PEAK GRADIENT: 2 MMHG
ECHO TV REGURGITANT MAX VELOCITY: 2.5 M/S
ECHO TV REGURGITANT PEAK GRADIENT: 25 MMHG
EOSINOPHIL # BLD: 0 K/UL (ref 0–0.4)
EOSINOPHIL # BLD: 0.1 K/UL (ref 0–0.4)
EOSINOPHIL NFR BLD: 0 % (ref 0–7)
EOSINOPHIL NFR BLD: 1 % (ref 0–7)
ERYTHROCYTE [DISTWIDTH] IN BLOOD BY AUTOMATED COUNT: 13.6 % (ref 11.5–14.5)
ERYTHROCYTE [DISTWIDTH] IN BLOOD BY AUTOMATED COUNT: 13.6 % (ref 11.5–14.5)
EST. AVERAGE GLUCOSE BLD GHB EST-MCNC: 123 MG/DL
GLOBULIN SER CALC-MCNC: 4.6 G/DL (ref 2–4)
GLUCOSE BLD STRIP.AUTO-MCNC: 226 MG/DL (ref 65–117)
GLUCOSE SERPL-MCNC: 125 MG/DL (ref 65–100)
GLUCOSE SERPL-MCNC: 149 MG/DL (ref 65–100)
GLUCOSE SERPL-MCNC: 91 MG/DL (ref 65–100)
HBA1C MFR BLD: 5.9 % (ref 4–5.6)
HCT VFR BLD AUTO: 43.3 % (ref 36.6–50.3)
HCT VFR BLD AUTO: 55.5 % (ref 36.6–50.3)
HDLC SERPL-MCNC: 44 MG/DL
HDLC SERPL: 3.9 {RATIO} (ref 0–5)
HGB BLD-MCNC: 13.7 G/DL (ref 12.1–17)
HGB BLD-MCNC: 17 G/DL (ref 12.1–17)
IMM GRANULOCYTES # BLD AUTO: 0 K/UL (ref 0–0.04)
IMM GRANULOCYTES # BLD AUTO: 0 K/UL (ref 0–0.04)
IMM GRANULOCYTES NFR BLD AUTO: 0 % (ref 0–0.5)
IMM GRANULOCYTES NFR BLD AUTO: 0 % (ref 0–0.5)
LDLC SERPL CALC-MCNC: 112.4 MG/DL (ref 0–100)
LYMPHOCYTES # BLD: 1.6 K/UL (ref 0.8–3.5)
LYMPHOCYTES # BLD: 1.9 K/UL (ref 0.8–3.5)
LYMPHOCYTES NFR BLD: 16 % (ref 12–49)
LYMPHOCYTES NFR BLD: 17 % (ref 12–49)
MAGNESIUM SERPL-MCNC: 2.5 MG/DL (ref 1.6–2.4)
MCH RBC QN AUTO: 31.3 PG (ref 26–34)
MCH RBC QN AUTO: 31.5 PG (ref 26–34)
MCHC RBC AUTO-ENTMCNC: 30.6 G/DL (ref 30–36.5)
MCHC RBC AUTO-ENTMCNC: 31.6 G/DL (ref 30–36.5)
MCV RBC AUTO: 102 FL (ref 80–99)
MCV RBC AUTO: 99.5 FL (ref 80–99)
MONOCYTES # BLD: 0.7 K/UL (ref 0–1)
MONOCYTES # BLD: 0.8 K/UL (ref 0–1)
MONOCYTES NFR BLD: 6 % (ref 5–13)
MONOCYTES NFR BLD: 8 % (ref 5–13)
NEUTS SEG # BLD: 7.4 K/UL (ref 1.8–8)
NEUTS SEG # BLD: 8.4 K/UL (ref 1.8–8)
NEUTS SEG NFR BLD: 75 % (ref 32–75)
NEUTS SEG NFR BLD: 76 % (ref 32–75)
NRBC # BLD: 0 K/UL (ref 0–0.01)
NRBC # BLD: 0 K/UL (ref 0–0.01)
NRBC BLD-RTO: 0 PER 100 WBC
NRBC BLD-RTO: 0 PER 100 WBC
P-R INTERVAL, ECG05: 192 MS
PLATELET # BLD AUTO: 162 K/UL (ref 150–400)
PLATELET # BLD AUTO: 213 K/UL (ref 150–400)
PMV BLD AUTO: 11.6 FL (ref 8.9–12.9)
PMV BLD AUTO: 11.7 FL (ref 8.9–12.9)
POTASSIUM SERPL-SCNC: 3.5 MMOL/L (ref 3.5–5.1)
POTASSIUM SERPL-SCNC: 3.9 MMOL/L (ref 3.5–5.1)
POTASSIUM SERPL-SCNC: 4.1 MMOL/L (ref 3.5–5.1)
PROT SERPL-MCNC: 8.3 G/DL (ref 6.4–8.2)
Q-T INTERVAL, ECG07: 282 MS
Q-T INTERVAL, ECG07: 384 MS
QRS DURATION, ECG06: 86 MS
QRS DURATION, ECG06: 88 MS
QTC CALCULATION (BEZET), ECG08: 427 MS
QTC CALCULATION (BEZET), ECG08: 497 MS
RBC # BLD AUTO: 4.35 M/UL (ref 4.1–5.7)
RBC # BLD AUTO: 5.44 M/UL (ref 4.1–5.7)
SERVICE CMNT-IMP: ABNORMAL
SODIUM SERPL-SCNC: 139 MMOL/L (ref 136–145)
SODIUM SERPL-SCNC: 141 MMOL/L (ref 136–145)
SODIUM SERPL-SCNC: 142 MMOL/L (ref 136–145)
TRIGL SERPL-MCNC: 73 MG/DL (ref ?–150)
TROPONIN-HIGH SENSITIVITY: 69 NG/L (ref 0–76)
TROPONIN-HIGH SENSITIVITY: 71 NG/L (ref 0–76)
TSH SERPL DL<=0.05 MIU/L-ACNC: 4.25 UIU/ML (ref 0.36–3.74)
VENTRICULAR RATE, ECG03: 101 BPM
VENTRICULAR RATE, ECG03: 138 BPM
VLDLC SERPL CALC-MCNC: 14.6 MG/DL
WBC # BLD AUTO: 11.1 K/UL (ref 4.1–11.1)
WBC # BLD AUTO: 9.9 K/UL (ref 4.1–11.1)

## 2022-01-01 PROCEDURE — 74011250636 HC RX REV CODE- 250/636: Performed by: STUDENT IN AN ORGANIZED HEALTH CARE EDUCATION/TRAINING PROGRAM

## 2022-01-01 PROCEDURE — 74170 CT ABD WO CNTRST FLWD CNTRST: CPT

## 2022-01-01 PROCEDURE — 71250 CT THORAX DX C-: CPT

## 2022-01-01 PROCEDURE — 93306 TTE W/DOPPLER COMPLETE: CPT

## 2022-01-01 PROCEDURE — 74220 X-RAY XM ESOPHAGUS 1CNTRST: CPT

## 2022-01-01 PROCEDURE — 82962 GLUCOSE BLOOD TEST: CPT

## 2022-01-01 PROCEDURE — 93296 REM INTERROG EVL PM/IDS: CPT | Performed by: INTERNAL MEDICINE

## 2022-01-01 PROCEDURE — 0BH17EZ INSERTION OF ENDOTRACHEAL AIRWAY INTO TRACHEA, VIA NATURAL OR ARTIFICIAL OPENING: ICD-10-PCS | Performed by: INTERNAL MEDICINE

## 2022-01-01 PROCEDURE — 74011250637 HC RX REV CODE- 250/637: Performed by: STUDENT IN AN ORGANIZED HEALTH CARE EDUCATION/TRAINING PROGRAM

## 2022-01-01 PROCEDURE — 84484 ASSAY OF TROPONIN QUANT: CPT

## 2022-01-01 PROCEDURE — 74011000250 HC RX REV CODE- 250: Performed by: STUDENT IN AN ORGANIZED HEALTH CARE EDUCATION/TRAINING PROGRAM

## 2022-01-01 PROCEDURE — 65270000046 HC RM TELEMETRY

## 2022-01-01 PROCEDURE — 71045 X-RAY EXAM CHEST 1 VIEW: CPT

## 2022-01-01 PROCEDURE — 70450 CT HEAD/BRAIN W/O DYE: CPT

## 2022-01-01 PROCEDURE — 77030004549 HC CATH ANGI DX PRF MRTM -A: Performed by: STUDENT IN AN ORGANIZED HEALTH CARE EDUCATION/TRAINING PROGRAM

## 2022-01-01 PROCEDURE — 97165 OT EVAL LOW COMPLEX 30 MIN: CPT

## 2022-01-01 PROCEDURE — 77030019569 HC BND COMPR RAD TERU -B: Performed by: STUDENT IN AN ORGANIZED HEALTH CARE EDUCATION/TRAINING PROGRAM

## 2022-01-01 PROCEDURE — 80048 BASIC METABOLIC PNL TOTAL CA: CPT

## 2022-01-01 PROCEDURE — 74011250637 HC RX REV CODE- 250/637: Performed by: INTERNAL MEDICINE

## 2022-01-01 PROCEDURE — 99153 MOD SED SAME PHYS/QHP EA: CPT | Performed by: STUDENT IN AN ORGANIZED HEALTH CARE EDUCATION/TRAINING PROGRAM

## 2022-01-01 PROCEDURE — 93294 REM INTERROG EVL PM/LDLS PM: CPT | Performed by: INTERNAL MEDICINE

## 2022-01-01 PROCEDURE — 94002 VENT MGMT INPAT INIT DAY: CPT

## 2022-01-01 PROCEDURE — 77010033678 HC OXYGEN DAILY

## 2022-01-01 PROCEDURE — 77030015766: Performed by: STUDENT IN AN ORGANIZED HEALTH CARE EDUCATION/TRAINING PROGRAM

## 2022-01-01 PROCEDURE — 65610000006 HC RM INTENSIVE CARE

## 2022-01-01 PROCEDURE — 5A1935Z RESPIRATORY VENTILATION, LESS THAN 24 CONSECUTIVE HOURS: ICD-10-PCS | Performed by: INTERNAL MEDICINE

## 2022-01-01 PROCEDURE — 80053 COMPREHEN METABOLIC PANEL: CPT

## 2022-01-01 PROCEDURE — B2111ZZ FLUOROSCOPY OF MULTIPLE CORONARY ARTERIES USING LOW OSMOLAR CONTRAST: ICD-10-PCS | Performed by: STUDENT IN AN ORGANIZED HEALTH CARE EDUCATION/TRAINING PROGRAM

## 2022-01-01 PROCEDURE — 83735 ASSAY OF MAGNESIUM: CPT

## 2022-01-01 PROCEDURE — 99152 MOD SED SAME PHYS/QHP 5/>YRS: CPT | Performed by: STUDENT IN AN ORGANIZED HEALTH CARE EDUCATION/TRAINING PROGRAM

## 2022-01-01 PROCEDURE — 97530 THERAPEUTIC ACTIVITIES: CPT

## 2022-01-01 PROCEDURE — 93005 ELECTROCARDIOGRAM TRACING: CPT

## 2022-01-01 PROCEDURE — 74011000636 HC RX REV CODE- 636: Performed by: RADIOLOGY

## 2022-01-01 PROCEDURE — 4A023N7 MEASUREMENT OF CARDIAC SAMPLING AND PRESSURE, LEFT HEART, PERCUTANEOUS APPROACH: ICD-10-PCS | Performed by: STUDENT IN AN ORGANIZED HEALTH CARE EDUCATION/TRAINING PROGRAM

## 2022-01-01 PROCEDURE — 77030019698 HC SYR ANGI MDLON MRTM -A: Performed by: STUDENT IN AN ORGANIZED HEALTH CARE EDUCATION/TRAINING PROGRAM

## 2022-01-01 PROCEDURE — C1769 GUIDE WIRE: HCPCS | Performed by: STUDENT IN AN ORGANIZED HEALTH CARE EDUCATION/TRAINING PROGRAM

## 2022-01-01 PROCEDURE — 74176 CT ABD & PELVIS W/O CONTRAST: CPT

## 2022-01-01 PROCEDURE — 85025 COMPLETE CBC W/AUTO DIFF WBC: CPT

## 2022-01-01 PROCEDURE — 77030008543 HC TBNG MON PRSS MRTM -A: Performed by: STUDENT IN AN ORGANIZED HEALTH CARE EDUCATION/TRAINING PROGRAM

## 2022-01-01 PROCEDURE — 74011000258 HC RX REV CODE- 258: Performed by: STUDENT IN AN ORGANIZED HEALTH CARE EDUCATION/TRAINING PROGRAM

## 2022-01-01 PROCEDURE — 93970 EXTREMITY STUDY: CPT

## 2022-01-01 PROCEDURE — 97162 PT EVAL MOD COMPLEX 30 MIN: CPT

## 2022-01-01 PROCEDURE — C1894 INTRO/SHEATH, NON-LASER: HCPCS | Performed by: STUDENT IN AN ORGANIZED HEALTH CARE EDUCATION/TRAINING PROGRAM

## 2022-01-01 PROCEDURE — 80061 LIPID PANEL: CPT

## 2022-01-01 PROCEDURE — 99285 EMERGENCY DEPT VISIT HI MDM: CPT

## 2022-01-01 PROCEDURE — B2151ZZ FLUOROSCOPY OF LEFT HEART USING LOW OSMOLAR CONTRAST: ICD-10-PCS | Performed by: STUDENT IN AN ORGANIZED HEALTH CARE EDUCATION/TRAINING PROGRAM

## 2022-01-01 PROCEDURE — 92950 HEART/LUNG RESUSCITATION CPR: CPT

## 2022-01-01 PROCEDURE — 83036 HEMOGLOBIN GLYCOSYLATED A1C: CPT

## 2022-01-01 PROCEDURE — 93458 L HRT ARTERY/VENTRICLE ANGIO: CPT | Performed by: STUDENT IN AN ORGANIZED HEALTH CARE EDUCATION/TRAINING PROGRAM

## 2022-01-01 PROCEDURE — 77030010221 HC SPLNT WR POS TELE -B: Performed by: STUDENT IN AN ORGANIZED HEALTH CARE EDUCATION/TRAINING PROGRAM

## 2022-01-01 PROCEDURE — 83880 ASSAY OF NATRIURETIC PEPTIDE: CPT

## 2022-01-01 PROCEDURE — 71275 CT ANGIOGRAPHY CHEST: CPT

## 2022-01-01 PROCEDURE — 74240 X-RAY XM UPR GI TRC 1CNTRST: CPT

## 2022-01-01 PROCEDURE — 5A12012 PERFORMANCE OF CARDIAC OUTPUT, SINGLE, MANUAL: ICD-10-PCS | Performed by: STUDENT IN AN ORGANIZED HEALTH CARE EDUCATION/TRAINING PROGRAM

## 2022-01-01 PROCEDURE — 82803 BLOOD GASES ANY COMBINATION: CPT

## 2022-01-01 PROCEDURE — 74011250636 HC RX REV CODE- 250/636: Performed by: INTERNAL MEDICINE

## 2022-01-01 PROCEDURE — 36415 COLL VENOUS BLD VENIPUNCTURE: CPT

## 2022-01-01 PROCEDURE — 84443 ASSAY THYROID STIM HORMONE: CPT

## 2022-01-01 PROCEDURE — 74011000636 HC RX REV CODE- 636: Performed by: STUDENT IN AN ORGANIZED HEALTH CARE EDUCATION/TRAINING PROGRAM

## 2022-01-01 PROCEDURE — 36600 WITHDRAWAL OF ARTERIAL BLOOD: CPT

## 2022-01-01 PROCEDURE — 74011000636 HC RX REV CODE- 636: Performed by: INTERNAL MEDICINE

## 2022-01-01 PROCEDURE — 97535 SELF CARE MNGMENT TRAINING: CPT

## 2022-01-01 RX ORDER — ONDANSETRON 2 MG/ML
4 INJECTION INTRAMUSCULAR; INTRAVENOUS
Status: DISCONTINUED | OUTPATIENT
Start: 2022-01-01 | End: 2022-01-01 | Stop reason: HOSPADM

## 2022-01-01 RX ORDER — HEPARIN SODIUM 1000 [USP'U]/ML
INJECTION, SOLUTION INTRAVENOUS; SUBCUTANEOUS AS NEEDED
Status: DISCONTINUED | OUTPATIENT
Start: 2022-01-01 | End: 2022-01-01 | Stop reason: HOSPADM

## 2022-01-01 RX ORDER — FUROSEMIDE 10 MG/ML
40 INJECTION INTRAMUSCULAR; INTRAVENOUS ONCE
Status: DISCONTINUED | OUTPATIENT
Start: 2022-01-01 | End: 2022-01-01

## 2022-01-01 RX ORDER — POTASSIUM CHLORIDE 750 MG/1
40 TABLET, FILM COATED, EXTENDED RELEASE ORAL
Status: COMPLETED | OUTPATIENT
Start: 2022-01-01 | End: 2022-01-01

## 2022-01-01 RX ORDER — FINASTERIDE 5 MG/1
5 TABLET, FILM COATED ORAL DAILY
Status: DISCONTINUED | OUTPATIENT
Start: 2022-01-01 | End: 2022-01-01 | Stop reason: HOSPADM

## 2022-01-01 RX ORDER — MAGNESIUM SULFATE HEPTAHYDRATE 40 MG/ML
2 INJECTION, SOLUTION INTRAVENOUS AS NEEDED
Status: DISCONTINUED | OUTPATIENT
Start: 2022-01-01 | End: 2022-01-01 | Stop reason: HOSPADM

## 2022-01-01 RX ORDER — ATORVASTATIN CALCIUM 20 MG/1
10 TABLET, FILM COATED ORAL DAILY
Status: DISCONTINUED | OUTPATIENT
Start: 2022-01-01 | End: 2022-01-01 | Stop reason: HOSPADM

## 2022-01-01 RX ORDER — POTASSIUM CHLORIDE 750 MG/1
20 TABLET, FILM COATED, EXTENDED RELEASE ORAL
Status: COMPLETED | OUTPATIENT
Start: 2022-01-01 | End: 2022-01-01

## 2022-01-01 RX ORDER — POTASSIUM CHLORIDE 7.45 MG/ML
10 INJECTION INTRAVENOUS AS NEEDED
Status: DISCONTINUED | OUTPATIENT
Start: 2022-01-01 | End: 2022-01-01 | Stop reason: HOSPADM

## 2022-01-01 RX ORDER — MEGESTROL ACETATE 40 MG/1
40 TABLET ORAL DAILY
Status: DISCONTINUED | OUTPATIENT
Start: 2022-01-01 | End: 2022-01-01 | Stop reason: HOSPADM

## 2022-01-01 RX ORDER — IPRATROPIUM BROMIDE AND ALBUTEROL SULFATE 2.5; .5 MG/3ML; MG/3ML
3 SOLUTION RESPIRATORY (INHALATION)
Status: DISCONTINUED | OUTPATIENT
Start: 2022-01-01 | End: 2022-01-01 | Stop reason: HOSPADM

## 2022-01-01 RX ORDER — ENOXAPARIN SODIUM 100 MG/ML
40 INJECTION SUBCUTANEOUS DAILY
Status: DISCONTINUED | OUTPATIENT
Start: 2022-01-01 | End: 2022-01-01 | Stop reason: HOSPADM

## 2022-01-01 RX ORDER — HEPARIN SODIUM 200 [USP'U]/100ML
INJECTION, SOLUTION INTRAVENOUS
Status: COMPLETED | OUTPATIENT
Start: 2022-01-01 | End: 2022-01-01

## 2022-01-01 RX ORDER — ACETAMINOPHEN 650 MG/1
650 SUPPOSITORY RECTAL
Status: DISCONTINUED | OUTPATIENT
Start: 2022-01-01 | End: 2022-01-01 | Stop reason: HOSPADM

## 2022-01-01 RX ORDER — MIDAZOLAM HYDROCHLORIDE 1 MG/ML
INJECTION, SOLUTION INTRAMUSCULAR; INTRAVENOUS AS NEEDED
Status: DISCONTINUED | OUTPATIENT
Start: 2022-01-01 | End: 2022-01-01 | Stop reason: HOSPADM

## 2022-01-01 RX ORDER — POLYETHYLENE GLYCOL 3350 17 G/17G
17 POWDER, FOR SOLUTION ORAL DAILY PRN
Status: DISCONTINUED | OUTPATIENT
Start: 2022-01-01 | End: 2022-01-01 | Stop reason: HOSPADM

## 2022-01-01 RX ORDER — FUROSEMIDE 10 MG/ML
40 INJECTION INTRAMUSCULAR; INTRAVENOUS DAILY
Status: DISCONTINUED | OUTPATIENT
Start: 2022-01-01 | End: 2022-01-01 | Stop reason: HOSPADM

## 2022-01-01 RX ORDER — ENOXAPARIN SODIUM 100 MG/ML
1 INJECTION SUBCUTANEOUS EVERY 12 HOURS
Status: DISCONTINUED | OUTPATIENT
Start: 2022-01-01 | End: 2022-01-01

## 2022-01-01 RX ORDER — DIGOXIN 0.25 MG/ML
125 INJECTION INTRAMUSCULAR; INTRAVENOUS
Status: COMPLETED | OUTPATIENT
Start: 2022-01-01 | End: 2022-01-01

## 2022-01-01 RX ORDER — MEGESTROL ACETATE 20 MG/1
20 TABLET ORAL DAILY
Status: DISCONTINUED | OUTPATIENT
Start: 2022-01-01 | End: 2022-01-01

## 2022-01-01 RX ORDER — POTASSIUM CHLORIDE 20 MEQ/1
40 TABLET, EXTENDED RELEASE ORAL
Status: COMPLETED | OUTPATIENT
Start: 2022-01-01 | End: 2022-01-01

## 2022-01-01 RX ORDER — TROSPIUM CHLORIDE 20 MG/1
20 TABLET, FILM COATED ORAL DAILY
Status: DISCONTINUED | OUTPATIENT
Start: 2022-01-01 | End: 2022-01-01 | Stop reason: HOSPADM

## 2022-01-01 RX ORDER — MAGNESIUM SULFATE HEPTAHYDRATE 40 MG/ML
2 INJECTION, SOLUTION INTRAVENOUS ONCE
Status: DISCONTINUED | OUTPATIENT
Start: 2022-01-01 | End: 2022-01-01 | Stop reason: SDUPTHER

## 2022-01-01 RX ORDER — ACETAMINOPHEN 325 MG/1
650 TABLET ORAL
Status: DISCONTINUED | OUTPATIENT
Start: 2022-01-01 | End: 2022-01-01 | Stop reason: SDUPTHER

## 2022-01-01 RX ORDER — SODIUM CHLORIDE 0.9 % (FLUSH) 0.9 %
5-40 SYRINGE (ML) INJECTION EVERY 8 HOURS
Status: DISCONTINUED | OUTPATIENT
Start: 2022-01-01 | End: 2022-01-01 | Stop reason: HOSPADM

## 2022-01-01 RX ORDER — MAGNESIUM SULFATE HEPTAHYDRATE 40 MG/ML
2 INJECTION, SOLUTION INTRAVENOUS ONCE
Status: COMPLETED | OUTPATIENT
Start: 2022-01-01 | End: 2022-01-01

## 2022-01-01 RX ORDER — METOPROLOL TARTRATE 5 MG/5ML
2.5 INJECTION INTRAVENOUS ONCE
Status: ACTIVE | OUTPATIENT
Start: 2022-01-01 | End: 2022-01-01

## 2022-01-01 RX ORDER — AMIODARONE HYDROCHLORIDE 200 MG/1
400 TABLET ORAL 2 TIMES DAILY
Status: DISCONTINUED | OUTPATIENT
Start: 2022-01-01 | End: 2022-01-01 | Stop reason: HOSPADM

## 2022-01-01 RX ORDER — ACETAMINOPHEN 325 MG/1
650 TABLET ORAL
Status: DISCONTINUED | OUTPATIENT
Start: 2022-01-01 | End: 2022-01-01 | Stop reason: HOSPADM

## 2022-01-01 RX ORDER — METOPROLOL TARTRATE 25 MG/1
25 TABLET, FILM COATED ORAL ONCE
Status: DISPENSED | OUTPATIENT
Start: 2022-01-01 | End: 2022-01-01

## 2022-01-01 RX ORDER — METOPROLOL TARTRATE 25 MG/1
25 TABLET, FILM COATED ORAL 2 TIMES DAILY
Status: DISCONTINUED | OUTPATIENT
Start: 2022-01-01 | End: 2022-01-01 | Stop reason: HOSPADM

## 2022-01-01 RX ORDER — VERAPAMIL HYDROCHLORIDE 2.5 MG/ML
INJECTION, SOLUTION INTRAVENOUS AS NEEDED
Status: DISCONTINUED | OUTPATIENT
Start: 2022-01-01 | End: 2022-01-01 | Stop reason: HOSPADM

## 2022-01-01 RX ORDER — SODIUM BICARBONATE 1 MEQ/ML
SYRINGE (ML) INTRAVENOUS
Status: DISCONTINUED
Start: 2022-01-01 | End: 2022-01-01 | Stop reason: HOSPADM

## 2022-01-01 RX ORDER — LIDOCAINE HYDROCHLORIDE 10 MG/ML
INJECTION, SOLUTION EPIDURAL; INFILTRATION; INTRACAUDAL; PERINEURAL AS NEEDED
Status: DISCONTINUED | OUTPATIENT
Start: 2022-01-01 | End: 2022-01-01 | Stop reason: HOSPADM

## 2022-01-01 RX ORDER — SODIUM CHLORIDE 0.9 % (FLUSH) 0.9 %
5-40 SYRINGE (ML) INJECTION AS NEEDED
Status: DISCONTINUED | OUTPATIENT
Start: 2022-01-01 | End: 2022-01-01 | Stop reason: HOSPADM

## 2022-01-01 RX ORDER — PANTOPRAZOLE SODIUM 40 MG/1
40 TABLET, DELAYED RELEASE ORAL DAILY
Status: DISCONTINUED | OUTPATIENT
Start: 2022-01-01 | End: 2022-01-01 | Stop reason: HOSPADM

## 2022-01-01 RX ORDER — SERTRALINE HYDROCHLORIDE 50 MG/1
50 TABLET, FILM COATED ORAL DAILY
COMMUNITY

## 2022-01-01 RX ORDER — SERTRALINE HYDROCHLORIDE 50 MG/1
50 TABLET, FILM COATED ORAL DAILY
Status: DISCONTINUED | OUTPATIENT
Start: 2022-01-01 | End: 2022-01-01 | Stop reason: HOSPADM

## 2022-01-01 RX ORDER — GUAIFENESIN 100 MG/5ML
81 LIQUID (ML) ORAL DAILY
Status: DISCONTINUED | OUTPATIENT
Start: 2022-01-01 | End: 2022-01-01 | Stop reason: HOSPADM

## 2022-01-01 RX ORDER — MEGESTROL ACETATE 20 MG/1
20 TABLET ORAL DAILY
COMMUNITY

## 2022-01-01 RX ORDER — VALSARTAN 40 MG/1
20 TABLET ORAL DAILY
Status: DISCONTINUED | OUTPATIENT
Start: 2022-01-01 | End: 2022-01-01 | Stop reason: HOSPADM

## 2022-01-01 RX ORDER — ONDANSETRON 4 MG/1
4 TABLET, ORALLY DISINTEGRATING ORAL
Status: DISCONTINUED | OUTPATIENT
Start: 2022-01-01 | End: 2022-01-01 | Stop reason: HOSPADM

## 2022-01-01 RX ADMIN — IOPAMIDOL 80 ML: 755 INJECTION, SOLUTION INTRAVENOUS at 18:35

## 2022-01-01 RX ADMIN — POTASSIUM CHLORIDE 40 MEQ: 750 TABLET, FILM COATED, EXTENDED RELEASE ORAL at 09:20

## 2022-01-01 RX ADMIN — ATORVASTATIN CALCIUM 10 MG: 10 TABLET, FILM COATED ORAL at 09:19

## 2022-01-01 RX ADMIN — VALSARTAN 20 MG: 40 TABLET, FILM COATED ORAL at 09:20

## 2022-01-01 RX ADMIN — AMIODARONE HYDROCHLORIDE 150 MG: 1.5 INJECTION, SOLUTION INTRAVENOUS at 11:17

## 2022-01-01 RX ADMIN — PANTOPRAZOLE SODIUM 40 MG: 40 TABLET, DELAYED RELEASE ORAL at 09:20

## 2022-01-01 RX ADMIN — SODIUM CHLORIDE, PRESERVATIVE FREE 10 ML: 5 INJECTION INTRAVENOUS at 22:55

## 2022-01-01 RX ADMIN — TROSPIUM CHLORIDE 20 MG: 20 TABLET, FILM COATED ORAL at 08:41

## 2022-01-01 RX ADMIN — AMIODARONE HYDROCHLORIDE 150 MG: 1.5 INJECTION, SOLUTION INTRAVENOUS at 14:40

## 2022-01-01 RX ADMIN — FINASTERIDE 5 MG: 5 TABLET, FILM COATED ORAL at 08:38

## 2022-01-01 RX ADMIN — ENOXAPARIN SODIUM 50 MG: 100 INJECTION SUBCUTANEOUS at 10:13

## 2022-01-01 RX ADMIN — POTASSIUM CHLORIDE 40 MEQ: 20 TABLET, EXTENDED RELEASE ORAL at 12:25

## 2022-01-01 RX ADMIN — FINASTERIDE 5 MG: 5 TABLET, FILM COATED ORAL at 09:19

## 2022-01-01 RX ADMIN — PANTOPRAZOLE SODIUM 40 MG: 40 TABLET, DELAYED RELEASE ORAL at 08:38

## 2022-01-01 RX ADMIN — POTASSIUM CHLORIDE 20 MEQ: 750 TABLET, FILM COATED, EXTENDED RELEASE ORAL at 16:10

## 2022-01-01 RX ADMIN — ASPIRIN 81 MG: 81 TABLET, CHEWABLE ORAL at 09:19

## 2022-01-01 RX ADMIN — SODIUM CHLORIDE, PRESERVATIVE FREE 10 ML: 5 INJECTION INTRAVENOUS at 23:07

## 2022-01-01 RX ADMIN — AMIODARONE HYDROCHLORIDE 1 MG/MIN: 50 INJECTION, SOLUTION INTRAVENOUS at 11:46

## 2022-01-01 RX ADMIN — ATORVASTATIN CALCIUM 10 MG: 10 TABLET, FILM COATED ORAL at 08:38

## 2022-01-01 RX ADMIN — SODIUM CHLORIDE, PRESERVATIVE FREE 10 ML: 5 INJECTION INTRAVENOUS at 22:02

## 2022-01-01 RX ADMIN — SODIUM CHLORIDE, PRESERVATIVE FREE 10 ML: 5 INJECTION INTRAVENOUS at 06:16

## 2022-01-01 RX ADMIN — MAGNESIUM SULFATE HEPTAHYDRATE 2 G: 40 INJECTION, SOLUTION INTRAVENOUS at 17:14

## 2022-01-01 RX ADMIN — MEGESTROL ACETATE 40 MG: 40 TABLET ORAL at 17:14

## 2022-01-01 RX ADMIN — SERTRALINE 50 MG: 50 TABLET, FILM COATED ORAL at 09:19

## 2022-01-01 RX ADMIN — DIGOXIN 125 MCG: 0.25 INJECTION INTRAMUSCULAR; INTRAVENOUS at 16:10

## 2022-01-01 RX ADMIN — FUROSEMIDE 40 MG: 10 INJECTION, SOLUTION INTRAMUSCULAR; INTRAVENOUS at 16:11

## 2022-01-01 RX ADMIN — SODIUM CHLORIDE, PRESERVATIVE FREE 10 ML: 5 INJECTION INTRAVENOUS at 17:16

## 2022-01-01 RX ADMIN — METOPROLOL TARTRATE 25 MG: 25 TABLET, FILM COATED ORAL at 06:16

## 2022-01-01 RX ADMIN — METOPROLOL TARTRATE 25 MG: 25 TABLET, FILM COATED ORAL at 16:10

## 2022-01-01 RX ADMIN — AMIODARONE HYDROCHLORIDE 0.5 MG/MIN: 50 INJECTION, SOLUTION INTRAVENOUS at 20:14

## 2022-01-01 RX ADMIN — FUROSEMIDE 40 MG: 10 INJECTION, SOLUTION INTRAMUSCULAR; INTRAVENOUS at 09:23

## 2022-01-01 RX ADMIN — SERTRALINE 50 MG: 50 TABLET, FILM COATED ORAL at 08:38

## 2022-01-01 RX ADMIN — TROSPIUM CHLORIDE 20 MG: 20 TABLET, FILM COATED ORAL at 09:21

## 2022-01-01 RX ADMIN — METOPROLOL TARTRATE 25 MG: 25 TABLET, FILM COATED ORAL at 17:16

## 2022-01-01 RX ADMIN — SODIUM CHLORIDE, PRESERVATIVE FREE 10 ML: 5 INJECTION INTRAVENOUS at 16:11

## 2022-01-01 RX ADMIN — SODIUM CHLORIDE, PRESERVATIVE FREE 10 ML: 5 INJECTION INTRAVENOUS at 04:30

## 2022-01-01 RX ADMIN — ENOXAPARIN SODIUM 50 MG: 100 INJECTION SUBCUTANEOUS at 21:54

## 2022-01-01 RX ADMIN — AMIODARONE HYDROCHLORIDE 400 MG: 200 TABLET ORAL at 17:16

## 2022-01-01 RX ADMIN — ASPIRIN 81 MG: 81 TABLET, CHEWABLE ORAL at 09:20

## 2022-01-01 RX ADMIN — MEGESTROL ACETATE 40 MG: 40 TABLET ORAL at 09:21

## 2022-01-01 RX ADMIN — IOPAMIDOL 100 ML: 755 INJECTION, SOLUTION INTRAVENOUS at 11:31

## 2022-01-01 RX ADMIN — AMIODARONE HYDROCHLORIDE 400 MG: 200 TABLET ORAL at 09:22

## 2022-08-31 PROBLEM — R07.9 CHEST PAIN: Status: ACTIVE | Noted: 2022-01-01

## 2022-08-31 NOTE — ED NOTES
Pt assessed, monitor x3, call bell within reach, son at bedside, snack given.   Awaiting to hear back from cardiology for admission

## 2022-08-31 NOTE — ED PROVIDER NOTES
EMERGENCY DEPARTMENT HISTORY AND PHYSICAL EXAM      Date: 8/31/2022  Patient Name: Grisel Stanley    History of Presenting Illness     Chief Complaint   Patient presents with    Shortness of Breath     Pt arrives ambulatory to triage, reports SOB 1-2 days and occasional chest pain. Patient saw PCP today who contacted patient's cardiologist who sent him to ED. History Provided By: Patient and Patient's Son    Grisel Stanley, 80 y.o. male     A 80-year-old male with history of paroxysmal atrial tachycardia, GERD, hypertension, pacemaker in place presenting with concern of worsening shortness of breath, intermittent chest pain and right lower extremity swelling yesterday. Presents with son who history is obtained from secondary to his dementia, states that he noticed his right lower extremity swelling yesterday, states it is mostly localized around his ankle area, states that he also feels as if patient had been getting shortness of breath with exertion over the past few days to week. He states that this has not been a problem the past, denies any underlying cardiac or pulmonary problems, he states that patient has intermittently been complaining of chest pain and recently in the waiting room stated that his chest hurt. Patient denies any history of cancer, denies any history of DVT, PE, no blood thinners, denies any CAD or history of CHF. Patient currently denying all pain or symptoms, infectious symptoms such as cough, runny nose, fevers or chills, denies any vomiting diarrhea, denies pain or recent injuries. There are no other complaints, changes, or physical findings at this time. PCP: Grant ALBERTO MD    No current facility-administered medications on file prior to encounter.      Current Outpatient Medications on File Prior to Encounter   Medication Sig Dispense Refill    lidocaine (XYLOCAINE) 2 % jelly Apply to the tip of your penis every 4 hours as needed for pain 30 mL 0    donepeziL (ARICEPT) 5 mg tablet       finasteride (PROSCAR) 5 mg tablet Take 5 mg by mouth daily. cyanocobalamin (VITAMIN B12) 1,000 mcg/mL injection 1,000 mcg by IntraMUSCular route every month. tamsulosin (FLOMAX) 0.4 mg capsule Take 0.4 mg by mouth daily. vit C/E/Zn/coppr/lutein/zeaxan (PRESERVISION AREDS-2 PO) Take 1 Tab by mouth daily. lovastatin (MEVACOR) 20 mg tablet Take 20 mg by mouth nightly. pantoprazole (PROTONIX) 40 mg tablet Take 40 mg by mouth daily. Past History     Past Medical History:  Past Medical History:   Diagnosis Date    Arthritis     Dyslipidemia     GERD (gastroesophageal reflux disease)     GI bleed     History of prostate surgery     Hypertension     PAT (paroxysmal atrial tachycardia) (HCC)     Prostate enlargement        Past Surgical History:  Past Surgical History:   Procedure Laterality Date    HX COLONOSCOPY  2004    HX ORTHOPAEDIC      multiple shoulder procedures    HX PROSTATECTOMY      HX UROLOGICAL      CO INS NEW/RPLCMT PRM PM W/TRANSV ELTRD ATRIAL&VENT N/A 7/19/2019    INSERT PPM DUAL performed by Stephanie Almaraz MD at [facility] CARDIAC CATH LAB       Family History:  Family History   Problem Relation Age of Onset    No Known Problems Mother        Social History:  Social History     Tobacco Use    Smoking status: Former    Smokeless tobacco: Never    Tobacco comments:     quit 30 years ago   Substance Use Topics    Alcohol use: Yes     Alcohol/week: 14.0 standard drinks     Types: 14 Shots of liquor per week     Comment: Per week     Drug use: No       Allergies:  No Known Allergies      Review of Systems   Review of Systems   Constitutional:  Positive for activity change, appetite change and fatigue. Negative for fever. HENT:  Positive for rhinorrhea (Chronic). Negative for congestion and sneezing. Respiratory:  Positive for shortness of breath. Negative for cough. Cardiovascular:  Positive for chest pain and leg swelling.    Gastrointestinal: Negative for abdominal pain, constipation, diarrhea, nausea and vomiting. Genitourinary:  Negative for decreased urine volume, dysuria and frequency. Musculoskeletal:  Negative for arthralgias and myalgias. Skin:  Negative for rash. Allergic/Immunologic: Negative for immunocompromised state. Neurological:  Negative for headaches. Hematological:  Does not bruise/bleed easily. Psychiatric/Behavioral:  Positive for confusion (Base line). Physical Exam   Physical Exam  Vitals reviewed. Constitutional:       General: He is not in acute distress. Appearance: He is not ill-appearing, toxic-appearing or diaphoretic. Comments: Cachectic appearing   HENT:      Head: Normocephalic and atraumatic. Mouth/Throat:      Mouth: Mucous membranes are moist.   Cardiovascular:      Rate and Rhythm: Tachycardia present. Pulmonary:      Effort: Tachypnea and accessory muscle usage present. No respiratory distress. Breath sounds: Examination of the right-lower field reveals rhonchi. Examination of the left-lower field reveals rhonchi. Rhonchi present. Abdominal:      Palpations: Abdomen is soft. Tenderness: There is no abdominal tenderness. There is no guarding or rebound. Musculoskeletal:      Cervical back: Neck supple. Right lower leg: No edema. Left lower leg: No edema. Skin:     General: Skin is warm and dry. Neurological:      Mental Status: He is alert. He is disoriented. Comments:  At baseline   Psychiatric:         Mood and Affect: Mood normal.       Diagnostic Study Results     Labs -     Recent Results (from the past 24 hour(s))   EKG, 12 LEAD, INITIAL    Collection Time: 08/31/22  5:02 PM   Result Value Ref Range    Ventricular Rate 101 BPM    Atrial Rate 101 BPM    P-R Interval 192 ms    QRS Duration 88 ms    Q-T Interval 384 ms    QTC Calculation (Bezet) 497 ms    Calculated R Axis -30 degrees    Calculated T Axis 163 degrees    Diagnosis       Sinus tachycardia with premature atrial complexes  Left axis deviation  Septal infarct (cited on or before 08-FEB-2020)  T wave abnormality, consider anterolateral ischemia  When compared with ECG of 08-FEB-2020 21:50,  Significant changes have occurred     CBC WITH AUTOMATED DIFF    Collection Time: 08/31/22  5:05 PM   Result Value Ref Range    WBC 11.1 4.1 - 11.1 K/uL    RBC 5.44 4.10 - 5.70 M/uL    HGB 17.0 12.1 - 17.0 g/dL    HCT 55.5 (H) 36.6 - 50.3 %    .0 (H) 80.0 - 99.0 FL    MCH 31.3 26.0 - 34.0 PG    MCHC 30.6 30.0 - 36.5 g/dL    RDW 13.6 11.5 - 14.5 %    PLATELET 135 426 - 618 K/uL    MPV 11.7 8.9 - 12.9 FL    NRBC 0.0 0  WBC    ABSOLUTE NRBC 0.00 0.00 - 0.01 K/uL    NEUTROPHILS 76 (H) 32 - 75 %    LYMPHOCYTES 17 12 - 49 %    MONOCYTES 6 5 - 13 %    EOSINOPHILS 0 0 - 7 %    BASOPHILS 1 0 - 1 %    IMMATURE GRANULOCYTES 0 0.0 - 0.5 %    ABS. NEUTROPHILS 8.4 (H) 1.8 - 8.0 K/UL    ABS. LYMPHOCYTES 1.9 0.8 - 3.5 K/UL    ABS. MONOCYTES 0.7 0.0 - 1.0 K/UL    ABS. EOSINOPHILS 0.0 0.0 - 0.4 K/UL    ABS. BASOPHILS 0.1 0.0 - 0.1 K/UL    ABS. IMM. GRANS. 0.0 0.00 - 0.04 K/UL    DF AUTOMATED     METABOLIC PANEL, COMPREHENSIVE    Collection Time: 08/31/22  5:05 PM   Result Value Ref Range    Sodium 142 136 - 145 mmol/L    Potassium 4.1 3.5 - 5.1 mmol/L    Chloride 102 97 - 108 mmol/L    CO2 34 (H) 21 - 32 mmol/L    Anion gap 6 5 - 15 mmol/L    Glucose 125 (H) 65 - 100 mg/dL    BUN 20 6 - 20 MG/DL    Creatinine 0.90 0.70 - 1.30 MG/DL    BUN/Creatinine ratio 22 (H) 12 - 20      GFR est AA >60 >60 ml/min/1.73m2    GFR est non-AA >60 >60 ml/min/1.73m2    Calcium 9.8 8.5 - 10.1 MG/DL    Bilirubin, total 0.7 0.2 - 1.0 MG/DL    ALT (SGPT) 40 12 - 78 U/L    AST (SGOT) 30 15 - 37 U/L    Alk.  phosphatase 78 45 - 117 U/L    Protein, total 8.3 (H) 6.4 - 8.2 g/dL    Albumin 3.7 3.5 - 5.0 g/dL    Globulin 4.6 (H) 2.0 - 4.0 g/dL    A-G Ratio 0.8 (L) 1.1 - 2.2     TROPONIN-HIGH SENSITIVITY    Collection Time: 08/31/22  5:05 PM Result Value Ref Range    Troponin-High Sensitivity 69 0 - 76 ng/L   NT-PRO BNP    Collection Time: 08/31/22  5:05 PM   Result Value Ref Range    NT pro-BNP 22,037 (H) <450 PG/ML       Radiologic Studies -   CTA CHEST W OR W WO CONT   Final Result         1. Questionable small segmental pulmonary embolus in the left lower lobe. 2. Chronic appearing interstitial lung opacities and scattered atelectasis   versus scarring. A couple tiny lung nodules are stable. 3. Cholelithiasis. Guidelines by the Fleischner society (Radiology 2017 special report) suggest   that patients with low risk for lung cancer and solid nodule(s) less than or   equal to 6 mm in diameter require no follow-up. In patients with a higher risk,   such as smokers, follow-up noncontrast chest CT should be considered at 12   months. Patients with a known malignancy are at increased risk for metastasis   and should receive a three month follow-up. XR CHEST PORT   Final Result   Minimal bibasilar atelectasis. .  . CT Results  (Last 48 hours)                 08/31/22 1954  CTA CHEST W OR W WO CONT Final result    Impression:          1. Questionable small segmental pulmonary embolus in the left lower lobe. 2. Chronic appearing interstitial lung opacities and scattered atelectasis   versus scarring. A couple tiny lung nodules are stable. 3. Cholelithiasis. Guidelines by the Fleischner society (Radiology 2017 special report) suggest   that patients with low risk for lung cancer and solid nodule(s) less than or   equal to 6 mm in diameter require no follow-up. In patients with a higher risk,   such as smokers, follow-up noncontrast chest CT should be considered at 12   months. Patients with a known malignancy are at increased risk for metastasis   and should receive a three month follow-up.                                        Narrative:  INDICATION:  Evaluate for pulmonary embolism        EXAM: CTA Chest with contrast for Pulmonary Embolus       COMPARISON:  2/22/2022       TECHNIQUE:  Following the uneventful intravenous administration of Iodinated   contrast, thin helical axial images were obtained through the chest. 3D image   postprocessing was performed. Coronal and sagittal reformatted images were   obtained. CT dose reduction was achieved through use of a standardized protocol   tailored for this examination and automatic exposure control for dose   modulation. FINDINGS:       Questionable small segmental pulmonary embolus in the left lower lobe (series 2,   image 43). No pleural or pericardial effusion. No thoracic lymphadenopathy. Bilateral subpleural interstitial opacities are likely chronic. Bilateral   subsegmental atelectasis versus scarring. A couple 2 mm right upper lobe lung   nodules (image 68). No other significant pulmonary abnormality. Layering small calcified stones in the gallbladder which is mildly distended       Mild to moderate degenerative changes in the spine                 CXR Results  (Last 48 hours)                 08/31/22 1745  XR CHEST PORT Final result    Impression:  Minimal bibasilar atelectasis. .  . Narrative:  INDICATION:  Shortness of breath        EXAM: Chest single view. COMPARISON: 3/11/2020. FINDINGS: A single frontal view of the chest at 1735 hours shows minimal   bibasilar atelectasis, with no developing acute consolidation or CHF pattern. .    The heart, mediastinum and pulmonary vasculature are normal  . Transvenous   pacemaker from the left appears stable. .  The bony thorax is unremarkable for   age, except for bilateral shoulder arthroplasty. . . Medical Decision Making   I am the first provider for this patient. I reviewed the vital signs, available nursing notes, past medical history, past surgical history, family history and social history.     Vital Signs-Reviewed the patient's vital signs. Patient Vitals for the past 12 hrs:   Pulse Resp BP SpO2   08/31/22 1827 99 27 -- --   08/31/22 1812 -- -- (!) 137/124 --   08/31/22 1658 (!) 103 20 108/72 93 %       Records Reviewed: Nursing records and medical records reviewed    Ddx: HF, CAD, ACS, PE, pneumonia    Initial assessment performed. The patients presenting problems have been discussed, and they are in agreement with the care plan formulated and outlined with them. I have encouraged them to ask questions as they arise throughout their visit. MDM  Number of Diagnoses or Management Options  Other chest pain  SOB (shortness of breath)  Diagnosis management comments: 5year-old male presenting with son for concern of lower extremity edema, shortness of breath, intermittent chest pain, patient present stable although does have increased work of breathing, using accessory muscles and supraclavicular area, patient also appears cachectic, states he has had 50 pound weight loss in the last year, patient with significant dementia, unable to answer questions, disoriented, history is obtained from son. States that he noticed right lower extremity swelling only, no history of CAD or CHF. No history of PE, EKG obtained in triage concerning for significant T wave inversion, discussion with Dr. Rita Aaron and stated no Cath Lab at this time.   We will plan on further evaluation with troponin, BNP, chest x-ray, concern for possible CAD with new CHF diagnosis versus PE        Amount and/or Complexity of Data Reviewed  Clinical lab tests: reviewed  Tests in the radiology section of CPT®: reviewed  Tests in the medicine section of CPT®: reviewed        ED Course as of 08/31/22 2105   Wed Aug 31, 2022   1737 Concern for possible Wellens syndrome on initial EKG, cardiology consult placed, labs ordered [RN]   2017 NT pro-BNP(!): 22,037  BNP elvated, likely vol overload althought poss PE on differential with tachyaria, will plan on CT PE and diuresis and ACS workup if neg  [RN]      ED Course User Index  [RN] Deepa Overton MD           Procedures        Disposition:   1. Admit to Hospitalist    Admission Note:  9:04 PM  Patient is being admitted to the hospital by [facility]. The results of their tests and reasons for their admission have been discussed with them and available family. They convey agreement and understanding for the need to be admitted and for their admission diagnosis. DISCHARGE PLAN:  1. Current Discharge Medication List        2. Follow-up Information    None       3. Return to ED if worse     Diagnosis     Clinical Impression:   1. SOB (shortness of breath)    2. Other chest pain        Attestations:    Vandana Condon MD    Please note that this dictation was completed with [employer], the computer voice recognition software. Quite often unanticipated grammatical, syntax, homophones, and other interpretive errors are inadvertently transcribed by the computer software. Please disregard these errors. Please excuse any errors that have escaped final proofreading. Thank you.

## 2022-08-31 NOTE — Clinical Note
TRANSFER - OUT REPORT:     Verbal report given to: Radha. Report consisted of patient's Situation, Background, Assessment and   Recommendations(SBAR). Opportunity for questions and clarification was provided. Patient transported with a Registered Nurse. [facility]

## 2022-09-01 PROBLEM — I50.30 DIASTOLIC HF (HEART FAILURE) (HCC): Status: ACTIVE | Noted: 2022-01-01

## 2022-09-01 NOTE — PROGRESS NOTES
Comprehensive Nutrition Assessment    Type and Reason for Visit: Initial, Positive nutrition screen    Nutrition Recommendations/Plan:   Cardiac diet, JLUIS. Removed diabetic restrictions, not indicated. Ensure plus TID  Please document % meals and supplements consumed in flowsheet I/O's under intake      Malnutrition Assessment:  Malnutrition Status:  Severe malnutrition (09/01/22 1429)    Context:  Chronic illness     Findings of the 6 clinical characteristics of malnutrition:   Energy Intake:  75% or less est energy requirements for 1 month or longer  Weight Loss:  Greater than 20% over 1 year     Body Fat Loss:  Mild body fat loss, Orbital, Fat overlying ribs   Muscle Mass Loss:  Mild muscle mass loss, Clavicles (pectoralis &deltoids), Temples (temporalis)  Fluid Accumulation:  No significant fluid accumulation,     Strength:  Not performed     Nutrition Assessment:     Chart reviewed for MST. Pt noted for chest pain, possible PE, dementia, essential HTN, HLD, GERD, HTN. Pt a poor historian d/t dementia. Son at bedside reports decreased appetite the last few months. Per EMR, he has lost 44lb (29%) over the last year. Significant fat and muscle wasting present as well, meeting ASPEN criteria for malnutrition detailed above. Supplements already ordered. Interview/assessment aborted early d/t providing coming in to discuss procedure. Will follow up and continue monitoring while in house. Wt Readings from Last 10 Encounters:   09/01/22 49.9 kg (110 lb)   09/28/21 69.9 kg (154 lb)   07/21/21 69.9 kg (154 lb)   06/14/21 69.9 kg (154 lb)   04/27/21 70.5 kg (155 lb 8 oz)   02/23/21 72.1 kg (159 lb)   02/17/21 76.2 kg (168 lb)   12/28/20 76.2 kg (168 lb)   11/18/20 76.2 kg (168 lb)   10/13/20 76.2 kg (168 lb)   ]    Nutrition Related Findings:    Labs reviewed. Meds: lipitor, lasix, megace. BM PTA.      Wound Type: None    Current Nutrition Intake & Therapies:  Average Meal Intake: NPO     ADULT ORAL NUTRITION SUPPLEMENT Breakfast, Lunch, Dinner; Standard High Calorie/High Protein  ADULT DIET Regular; Low Fat/Low Chol/High Fiber/JLUIS    Anthropometric Measures:  Height: 5' 6\" (167.6 cm)  Ideal Body Weight (IBW): 142 lbs (65 kg)     Current Body Wt:  49.9 kg (110 lb), 77.5 % IBW.  Not specified  Current BMI (kg/m2): 17.8        Weight Adjustment: No adjustment                 BMI Category: Underweight (BMI less than 18.5)    Estimated Daily Nutrient Needs:  Energy Requirements Based On: Formula  Weight Used for Energy Requirements: Current  Energy (kcal/day): 1690 kcals (BMR x 1.3AF + 250)  Weight Used for Protein Requirements: Current  Protein (g/day): 60g (1.2g/kg)  Method Used for Fluid Requirements: 1 ml/kcal  Fluid (ml/day): 1700mL    Nutrition Diagnosis:   Underweight related to inadequate protein-energy intake as evidenced by BMI    Nutrition Interventions:   Food and/or Nutrient Delivery: Continue current diet, Continue oral nutrition supplement  Nutrition Education/Counseling: No recommendations at this time  Coordination of Nutrition Care: Continue to monitor while inpatient       Goals:     Goals: PO intake 50% or greater, by next RD assessment       Nutrition Monitoring and Evaluation:   Behavioral-Environmental Outcomes: None identified  Food/Nutrient Intake Outcomes: Food and nutrient intake, Supplement intake  Physical Signs/Symptoms Outcomes: Biochemical data, GI status, Weight, Nutrition focused physical findings    Discharge Planning:    Continue current diet, Continue oral nutrition supplement    Warden Sushma RD  Contact: [phone]

## 2022-09-01 NOTE — ED NOTES
TRANSITION OF CARE - SBAR OUT    Patient is being transferred to [facility] Cardio, Room# 2159. Report GIVEN TO [facility], RN on Darron Bunn for routine progression of care. Report is consisted of the following information SBAR, ED Summary, MAR, and Recent Results. Patient transferred to receiving unit by: inpt RN (RN or Tech Name)     Called outstanding consults: Yes  Collected routine labs: Yes     All current orders reviewed with accepting nurse: Yes    The following personal items will be sent with the patient during transfer to the floor:   All valuables:                          CARDIAC MONITORING ORDERED: Yes     The following CURRENT information were reported to the receiving RN:    CODE STATUS: Full Code    NIH SCORE:    MELISSA SCREENING:      NEURO ASSESSMENT: Neuro  Neurologic State: Lethargic (08/31/22 1829)  Orientation Level: Oriented to person, Oriented to place (08/31/22 1829)      RESTRAINTS IN USE: No      IS DOCUMENTATION COMPLETE: Yes      Vital Signs  Level of Consciousness: Alert (0) (08/31/22 1658)  Temp:  (unable to obtain in triage) (08/31/22 1658)  Pulse (Heart Rate): 99 (08/31/22 1827)  Cardiac Rhythm: Sinus Tachy, PAC (08/31/22 1827)  Resp Rate: 27 (08/31/22 1827)  BP: (!) 137/124 (08/31/22 1812)  MAP (Monitor): 130 (08/31/22 1812)  MAP (Calculated): 128 (08/31/22 1812)  BP 1 Location: Left upper arm (08/31/22 1658)  BP 1 Method: Automatic (08/31/22 1658)  BP Patient Position: At rest (08/31/22 1658)  Pain 1  Pain Scale 1: Numeric (0 - 10) (08/31/22 1658)  Pain Intensity 1: 1 (08/31/22 1658)      OXYGEN: Oxygen Therapy  O2 Device: None (Room air) (08/31/22 1658)    KINDER FALL ASSESSMENT:  Presents to emergency department  because of falls (Syncope, seizure, or loss of consciousness): No, Age > 79: Yes, Altered Mental Status, Intoxication with alcohol or substance confusion (Disorientation, impaired judgment, poor safety awaremess, or inability to follow instructions): No, Impaired Mobility: Ambulates or transfers with assistive devices or assistance; Unable to ambulate or transer.: Yes    WOUNDS: No      URINARY CATHETER: voiding    LINE ACCESS:   Peripheral IV 08/31/22 Right Antecubital (Active)   Site Assessment Clean, dry, & intact 08/31/22 1827   Dressing Status Clean, dry, & intact 08/31/22 1827   Alcohol Cap Used No 08/31/22 1827        Opportunity for questions and clarification were provided.   Marilee Lorenzo RN

## 2022-09-01 NOTE — PROGRESS NOTES
Hospitalist Progress Note    NAME: Torie Lowery   :  1932   MRN:  [id number]       Assessment / Plan:  Chest pain POA  Small LLL PE POA- on CTA chest  Paroxsymal AFib RVR ()   NEW systolic CHF POA- EF 00-84% on Echo  Duplex negative BLE  Trop= 69    Cont telemonitoring  Trend High sensitive troponin for 2 sets  IP Cardiology consulted- Cont ASA, Lovenox for PE- if no improvement consider stress test/ Cardiac cath now that pt has some Wall motion abnormality and new systolic failure with EF 76-61% on Echo  Cont SQ Lovenox 1mg/kg Q 12 hrs as ordered,  S/p Amio bolus followed by Amiodarone drip per cardiology   Will add lasix 40 mg daily for CHF  Pt currently not hypoxic     Moderate protein calorie malnutrition  Dietary supplements ordered    History of right lower extremity swelling now resolved  No evidence of cellulitis  Duplex negative for DVT     Dementia  Continue PTA sertraline for anxiety secondary to dementia     Essential hypertension  Hyperlipidemia  Continue PTA atorvastatin     GERD  Formulary substitution PPI-Protonix     BPH  Continue PTA finasteride  Formulary substitution-Sanctura     Code Status: Full  Surrogate Decision Maker: Son     DVT Prophylaxis: Lovenox-  GI Prophylaxis: not indicated      less than 18.5 Underweight / Body mass index is 17.75 kg/m². Estimated discharge date: Sept 3 ? Barriers: none    Recommended Disposition: Home w/Family and HH PT, OT, RN     Subjective:     Chief Complaint / Reason for Physician Visit: F/U acute on chronic systolic CNF (NEW), Afib RVR, PE(small) LLL  \"I am feeling little better\". Discussed with RN events overnight.      Review of Systems:  Symptom Y/N Comments  Symptom Y/N Comments   Fever/Chills n   Chest Pain n    Poor Appetite n   Edema n    Cough n   Abdominal Pain n    Sputum n   Joint Pain n    SOB/RANGEL y   Pruritis/Rash     Nausea/vomit n   Tolerating PT/OT y    Diarrhea    Tolerating Diet y    Constipation    Other       Could NOT obtain due to:      Objective:     VITALS:   Last 24hrs VS reviewed since prior progress note. Most recent are:  Patient Vitals for the past 24 hrs:   Temp Pulse Resp BP SpO2   09/01/22 1039 97.6 °F (36.4 °C) (!) 146 (!) 37 (!) 125/93 92 %   09/01/22 0940 -- -- -- 121/77 --   09/01/22 0746 97.4 °F (36.3 °C) 91 15 121/77 91 %   09/01/22 0311 97.8 °F (36.6 °C) 98 20 (!) 151/87 94 %   08/31/22 2310 98 °F (36.7 °C) 93 22 123/81 94 %   08/31/22 1827 -- 99 27 -- --   08/31/22 1812 -- -- -- (!) 137/124 --   08/31/22 1658 -- (!) 103 20 108/72 93 %     No intake or output data in the 24 hours ending 09/01/22 1327     I had a face to face encounter and independently examined this patient on 9/1/2022, as outlined below:  PHYSICAL EXAM:  General: WD, WN. Alert, cooperative, no acute distress    EENT:  EOMI. Anicteric sclerae. MMM  Resp:  CTA bilaterally, no wheezing or rales. No accessory muscle use  CV:  irregular  rhythm +, tachycardia +,  No edema  GI:  Soft, Non distended, Non tender. +Bowel sounds  Neurologic:  Alert and oriented X 3, normal speech,   Psych:   Good insight. Not anxious nor agitated  Skin:  No rashes. No jaundice    Reviewed most current lab test results and cultures  YES  Reviewed most current radiology test results   YES  Review and summation of old records today    NO  Reviewed patient's current orders and MAR    YES  PMH/SH reviewed - no change compared to H&P  ________________________________________________________________________  Care Plan discussed with:    Comments   Patient x    Family  x Son at bedside   RN x    Care Manager [facility]   Consultant                        Multidiciplinary team rounds were held today with , nursing, pharmacist and clinical coordinator. Patient's plan of care was discussed; medications were reviewed and discharge planning was addressed.      ________________________________________________________________________  Total NON critical care TIME:  36 Minutes    Total CRITICAL CARE TIME Spent:   Minutes non procedure based      Comments   >50% of visit spent in counseling and coordination of care x    ________________________________________________________________________  Keith Lesch, MD     Procedures: see electronic medical records for all procedures/Xrays and details which were not copied into this note but were reviewed prior to creation of Plan. LABS:  I reviewed today's most current labs and imaging studies.   Pertinent labs include:  Recent Labs     09/01/22  0411 08/31/22  1705   WBC 9.9 11.1   HGB 13.7 17.0   HCT 43.3 55.5*    213     Recent Labs     09/01/22  0411 08/31/22  1705    142   K 3.9 4.1    102   CO2 35* 34*   GLU 91 125*   BUN 18 20   CREA 0.57* 0.90   CA 9.1 9.8   ALB  --  3.7   TBILI  --  0.7   ALT  --  40       Signed: Keith Lesch, MD

## 2022-09-01 NOTE — PROGRESS NOTES
Brief Procedure Note:         Indication:   CP, RANGEL, Abn EKG     Procedure:   LHC, Cors, LV gram     Complications: None   Blood loss: Minimal   Condition: Stable     Brief procedure Result:   Non obstructive CAD   Distal LAD small and PDA is large and wraps around LV apex and supplies LV apex   35% LVEF, apical hypokinesis, possible stress cardiomyopathy   Mildly elevated LVEDP     Recommendations:     No significant CAD, Distal LAD small and PDA is large and wraps around LV apex and supplies LV apex.    Review of Echocardiogram showed apical 2/3 of ventricle has hypokinesis and preserved function at bases, possible related to takotsubo/ stress cardiomyopathy or tachycardia related cardiomyopathy       Non obstructive CAD  Stress cardiomyopathy or tachycardia related cardiomyopathy   Atrial fibrillation with RVR    - interrogate device   - cont amiodarone gtt and cont lovenox for [facility]-   - if remains in Afib, consider ELVI and DCCV           Full note and recommendations to follow. '

## 2022-09-01 NOTE — PROGRESS NOTES
I spoke to Dr. Cassandra Aguilar (PCP) earlier today about his presentation and the ECG showing anterior T inversions. We agreed that he would be taken to [facility] ER and evaluated for admission. Here his ECG shows sinus rhythm and similar T inversions as prior. Troponin negative. Dr. Karli Craig of [facility] was accidentally called about this patient and our group therefore wasn't notified of his arrival.  I incidentally noted he was in the ER while looking for another patient. He recently transitioned care to [facility] in 4/2022 so understandable that the mistake was made as he was formerly a patient of [facility]. I called his son Sohan Fitch) who was clear to me that he thought our group would see him. Awaiting CTA result.     Signed By: Abelardo Ayala MD     August 31, 2022

## 2022-09-01 NOTE — PROGRESS NOTES
1015: pt returned from echo. Interrogated PPM  1020: pt  O2 92 on RA. MD Godfrey Garnica at bedside. 1040: pt HR sustaining in 140s /93 O2 94 RA, no resp distress. MD Godfrey Garnica notified and cardiology CHRIS Reeves. Advised to get EKG. Pt in AFIB with RVR amiodarone bolus given and began amiodarone drip afterwards. 1545: pt returned from [facility]. R radial TR band PH @10 & CDI  1730: deflated 2 CC of air from TR band and site began to bleed. 2CC reinflated.

## 2022-09-01 NOTE — PROGRESS NOTES
2300 - pt admitted from ED. Pt oriented to unit and use of call bell. Family member assisted in answering admission assessment questions d/t pmh of dementia. Bed alarm on, bed locked in lowest position and call bell within reach.

## 2022-09-01 NOTE — CONSULTS
IP Cardiology Consult       Date of consult:  09/01/22  Date of admission: 8/31/2022  Primary Cardiologist: Dr. Darci Wheeler  Physician Requesting consult: Dr. Kristopher Oneill:    Problem list:   Dyspnea  Abnormal EKG,  T wave version involving anterolateral lead  Elevated BNP  Questionable pulmonary embolism by CTA  Sick sinus syndrome status post dual-chamber pacemaker  Hypertension  Hyperlipidemia  Dementia       Recommendations:    Continue aspirin  On Lovenox for possible pulmonary embolism  CTA findings showed questionable pulmonary embolism, it is not definitely, T wave version raises question of coronary artery disease, he also has elevated BNP of 22,000. I have ordered echocardiogram to evaluate for LV function. I have discussed briefly with son about ischemic evaluation with left heart catheterization versus stress test pending result of echocardiogram. He does not want to pursue aggressive treatment and want to think about it, which is reasonable considering his age and poor functional status, we have discussed about medical therapy as well. Continue Lipitor    Thank you for this consult and allowing me to take part in this patients care. Please call with questions. [x]        High complexity decision making was performed      CC / Reason for consult: Abnormal EKG    History of the presenting illness:  Mauricio Jones is a 80 y.o. male with past medical history of sick sinus syndrome status post dual-chamber pacemaker, hypertension, hyperlipidemia, dementia limited functional status, lives with son presented to emergency room with shortness of breath. He has dementia and he is poor historian. History is obtained from son. Over past few days he is having episodes of shortness of breath. He did not mention of any chest pain. He also has trace pedal edema. He was seen by primary care physician and EKG noted to have T wave inversion involving anterolateral leads.   He was referred to emergency room for cardiac evaluation after Case was discussed with Dr. Kathy Posadas. Troponin was unremarkable. BNP was significantly elevated at 22,000. He CT scan shows questionable small pulmonary embolism. Lower extremity duplex was unremarkable. Past Medical History:   Diagnosis Date    Arthritis     Dyslipidemia     GERD (gastroesophageal reflux disease)     GI bleed     History of prostate surgery     Hypertension     PAT (paroxysmal atrial tachycardia) (HCC)     Prostate enlargement        Prior to Admission medications    Medication Sig Start Date End Date Taking? Authorizing Provider   sertraline (Zoloft) 50 mg tablet Take 50 mg by mouth daily. Yes Provider, Historical   megestroL (MEGACE) 20 mg tablet Take 20 mg by mouth daily. Yes Provider, Historical   mirabegron ER (Myrbetriq) 50 mg ER tablet Take 50 mg by mouth daily. Yes Provider, Historical   lidocaine (XYLOCAINE) 2 % jelly Apply to the tip of your penis every 4 hours as needed for pain 9/28/21  Yes TermeerMimi., MD   finasteride (PROSCAR) 5 mg tablet Take 5 mg by mouth daily. Yes Provider, Historical   cyanocobalamin (VITAMIN B12) 1,000 mcg/mL injection 1,000 mcg by IntraMUSCular route every month. 2/6/20  Yes Other, MD Fred   lovastatin (MEVACOR) 20 mg tablet Take 20 mg by mouth nightly. Yes Provider, Historical   pantoprazole (PROTONIX) 40 mg tablet Take 40 mg by mouth daily. Yes Provider, Historical       Family History   Problem Relation Age of Onset    No Known Problems Mother        Social History     Socioeconomic History    Marital status:      Spouse name: Not on file    Number of children: Not on file    Years of education: Not on file    Highest education level: Not on file   Occupational History    Not on file   Tobacco Use    Smoking status: Former    Smokeless tobacco: Never    Tobacco comments:     quit 30 years ago   Substance and Sexual Activity    Alcohol use:  Yes     Alcohol/week: 14.0 standard drinks     Types: 14 Shots of liquor per week     Comment: Per week     Drug use: No    Sexual activity: Not on file   Other Topics Concern     Service Not Asked    Blood Transfusions Not Asked    Caffeine Concern Not Asked    Occupational Exposure Not Asked    Hobby Hazards Not Asked    Sleep Concern Not Asked    Stress Concern Not Asked    Weight Concern Not Asked    Special Diet Not Asked    Back Care Not Asked    Exercise Not Asked    Bike Helmet Not Asked    Seat Belt Not Asked    Self-Exams Not Asked   Social History Narrative    Not on file     Social Determinants of Health     Financial Resource Strain: Not on file   Food Insecurity: Not on file   Transportation Needs: Not on file   Physical Activity: Not on file   Stress: Not on file   Social Connections: Not on file   Intimate Partner Violence: Not on file   Housing Stability: Not on file         ROS      Total of 12 systems reviewed, all systems review was negative except Pertinent Positives included in HPI     Visit Vitals  /77   Pulse 91   Temp 97.4 °F (36.3 °C)   Resp 15   Ht 5' 6\" (1.676 m)   Wt 50.2 kg (110 lb 10.7 oz)   SpO2 91%   BMI 17.86 kg/m²       Physical Exam  Examination:     General: Alert + Oriented x1, oriented to self  HEENT: Normocephalic aromatic, MMM   Neck: Supple, JVP- not well appreciated   RS: Non labored, clear   CVS: Regular rate and rhythm, H9V3, S3,  systolic murmur  Abd: Soft, non tender, non distended   Lower extremity: Warm to touch, Edema- None   Skin: Warm and dry, No significant bruises or rash   CNS: Oriented x3, no focal neuro deficit     Lab review:  BMP:   Lab Results   Component Value Date/Time     09/01/2022 04:11 AM    K 3.9 09/01/2022 04:11 AM     09/01/2022 04:11 AM    CO2 35 (H) 09/01/2022 04:11 AM    AGAP 3 (L) 09/01/2022 04:11 AM    GLU 91 09/01/2022 04:11 AM    BUN 18 09/01/2022 04:11 AM    CREA 0.57 (L) 09/01/2022 04:11 AM    GFRAA >60 09/01/2022 04:11 AM    GFRNA >60 09/01/2022 04:11 AM        CBC:  Lab Results   Component Value Date/Time    WBC 9.9 2022 04:11 AM    HGB 13.7 2022 04:11 AM    HCT 43.3 2022 04:11 AM    PLATELET 411  04:11 AM    MCV 99.5 (H) 2022 04:11 AM       All Cardiac Markers in the last 24 hours:    Lab Results   Component Value Date/Time    BNPNT 22,037 (H) 2022 05:05 PM       Data review:    Tele:  Normal sinus rhythm    EKG tracing personally reviewed:   Normal sinus rhythm, deep T wave inversion involving anterior leads    Echocardiogram:    Other cardiac testing:    Other imaging:        Signed:  Sharlene Peralta MD  Interventional Cardiology  2022      ___________________________          2022      Radha Patel  80year old M (1932)  Account #: [de-identified]  PRIMARY CARE PHYSICIAN:  Orin Beth MD  CARDIOLOGIST:  Toan Kelley MD    REASON FOR VISIT:  This 80year old male presents for Fatigue. HISTORY OF PRESENT ILLNESS:   1. Dual chamber [employer] pacemaker for sick sinus syndrome. He had a pacemaker implanted by Dr. Erna Ansari.  Has had fatigue prior to this, but also after this. This has been over the last few years. 2.  Hypertension. 3.  Hyperlipidemia. 4.  Full code. Denies syncope, dizziness, palpitations. No chest, jaw, neck, shoulder, or arm pain. No orthopnea, PND, or edema. Patient denies claudication. There is no discoloration or ulceration of the lower extremities. The patient has had no TIA or stroke-like symptoms.       ACTIVE ALLERGIES:  Ingredient Reaction Comment   NO KNOWN ALLERGIES       None    PROBLEM LIST:  Problem Description Chronic   SSS Y       PAST MEDICAL/SURGICAL HISTORY  (Detailed)    Disease/disorder Onset Date Surgical History Date Comments   Benign Prostatic Hypertrophy       Diverticulitis       GERD       hyperlipidemia       Hypertension       Macular degeneration       PAT       SSS         Family History  (Detailed)  Relationship Family Member Name  Age at Death Condition Onset Age Cause of Death   Brother  Y  bronchitis  N   Daughter  Y 62 Pancreatic cancer  N   Father  Y 80 Bone Tumor  N   Mother  Y 80 Cerebrovascular Accident  N   Son  N  Throat Cancer  N   Son    Parkinson's Disease  N     SOCIAL HISTORY  (Detailed)  Tobacco use reviewed. Preferred language is [place]. Smoking status: Former smoker. SMOKING STATUS  Use Status Type Smoking Status Usage Per Day Years Used Total Pack Years   yes Cigarette Former smoker        CESSATION  Type Date Quit Longest Tobacco Free Cessation Method   Cigarette 01/01/1977           ALCOHOL  There is a history of alcohol use. REVIEW OF SYMPTOMS:    CONST - Negative for weight gain, weight loss, fever. Positive for increased fatigue. EYES - Negative for visual changes. ENT - Negative for hearing loss. RESP - Negative for snoring, hemoptysis, dyspnea. CARD - Negative for chest pain, diaphoresis, orthopnea, palpitation, syncope, PND.  VASC - Negative for claudication, edema. GI - Negative for nausea, reflux, bleeding.  - Negative for hematuria, nocturia. REPROD - Negative for erectile dysfunction. ENDO - Negative for goiter, tremors. NEURO - Negative for dizziness, memory loss, seizures. PSYCH - Negative for depression, hallucinations. DERM - Negative for rash, skin sores. M/S - Negative for joint pain, myalgia. HEMAT - Negative for acute anemia, thrombocytopenia. VITAL SIGNS  Time BP mm/Hg Pulse /min Resp /min Temp F Ht ft Ht in Ht cm Wt lb Wt kg BMI kg/m2 BSA m2 O2 Sat%   1:00 /72 60   5.0 6.00 167.64 128.00 58.060 20.66         PHYSICAL EXAM:  Exam Findings Details   Const Neg Level of Distress - Awake / Alert. Nourishment - Well Nourished. Appearance - Well Developed. Resp Neg Respirations - Nonlabored. Breath Sounds - Clear Throughout. Rales - Absent. Wheezes - Absent. Rhonchi - Absent. Cardiac Neg Rhythm - Regular.   Palpation - PMI Normal.  Heart Sounds - S1 Normal, S2 Normal, No S3, No S4. Extra Sounds - None. Murmurs - None. Vasc Neg Carotid - Bilateral Normal Pulse. Dorsalis Pedis - Bilateral Normal Pulse. Vasc Pos Aorta - Nonpalpable. Abd Neg Tenderness - None. EXT Neg Clubbing - Absent. Lower Extremity Edema - Absent. Psych Neg Orientation - Oriented to Time, Person, Place. ProcSite Neg Pacemaker Pocket - Healed. IMPRESSION AND PLAN  01. Sick sinus syndrome (I49.5): Fatigue as a symptom, but unclear etiology. Pacemaker check today showed normal function, decent HR histogram, minor arrhythmia burden--NOTHING TO EXPLAIN THE FATIGUE. No evidence by symptoms that this is orthostatic hypotension. 2-D w/CFD Echo to be done   02. Primary hypertension (I10): I have made no changes to the present regimen. 03. Pacemaker (Z95.0)   04. Body mass index [BMI] 20.0-20.9, adult (Y32.31)     ORDERS:  1 2-D w/ CFD Echocardiogram.    2 Return office visit with Alyce Gomez MD in 1 Year. FINAL MEDICATION LIST  Medication Sig Desc Non-VCS   cranberry 400 mg capsule Take daily Y   donepezil 10 mg tablet take 1 tablet by oral route  every day in the evening Y   finasteride 5 mg tablet take 1 tablet by oral route  every day Y   lovastatin 20 mg tablet take 1 tablet by oral route  every day with the evening meal Y   mirtazapine 7.5 mg tablet take 1 tablet by oral route  every day at bedtime Y   multivitamin tablet take 1 tablet by oral route  every day with food Y   Myrbetriq 50 mg tablet,extended release take 1 tablet by oral route  every day swallowing whole with water. Do not crush, chew and/or divide.  Y   PreserVision AREDS 14,320 unit-226 mg-200 unit capsule  Y   Protonix 40 mg tablet,delayed release take 1 tablet by oral route  every day Y   Vitamin B-12 inject once monthly Y   Vitamin D3 125 mcg (5,000 unit) tablet Take daily Y       Electronically signed by:       Alyce Goemz MD 04/18/2022  @ 2:25 PM    Document generated by: Matthew Jernigan 04/18/2022

## 2022-09-01 NOTE — PROGRESS NOTES
Pt had atrial fibrillation with RVR   Started on amiodarone gtt   Give metoprolol for rate control   Echo showed EF 25-30% with WMA   Discussed with son about risk and benefit of LHC   He agreed to proceed . Mercedes Damian

## 2022-09-01 NOTE — PROGRESS NOTES
OCCUPATIONAL THERAPY:  Chart reviewed. Discussed with nurse. .. pt with sustained tachycardia via monitor, 130's up to 153 bpm upon observation with pt resting in bed. Cardiology on board. Will defer at this time and continue to follow for mobility efforts as HR more stable.   Mayte Valerio, OTR/L

## 2022-09-01 NOTE — PROGRESS NOTES
1022: discussed with Dr. Kushal Stone (cardiology), apparently patient has switched to VCS. Consult called to both practices. Signing off.      Garrick Sigala [id number]

## 2022-09-01 NOTE — PROGRESS NOTES
Physical Therapy:  Chart reviewed. Discussed with nurse. .. pt with sustained tachycardia via monitor, 130's up to 153 bpm upon observation with pt resting in bed. Cardiology on board. Will defer at this time and continue to follow for mobility efforts as HR more stable.

## 2022-09-01 NOTE — CONSULTS
[facility]  [address], [address] [place] [address]  [phone]  [address]. [address]       Date of  Admission: 8/31/2022  5:17 PM     Admission type:Emergency   Primary Care Physician:Orin Beth MD     Attending Provider: Maryanne Martinez MD  Cardiology Provider: Dr Pablo Jordan and Dr. Emery Frames: Chest Pain and SOB     Subjective:     Natalee Chen is an 80 y.o. male admitted for Chest pain [R07.9]. PMHx significant for HTN, PAT, HLD, GERD, DHF, and Dementia. By chart review, he arrived to ED evening of 8/31, complaints of CP and SOB. Sent from PCP office after EKG showed some T wave inversions. Son reported leg swelling. NT pro-BNP was 22k. Upon assessment, the patient is laying in bed. Able to state \"I think I am ok, I have no pain\". No family present in room at time of assessment. Patient on RA, appears in no distress. Limited history given Dementia. ECHO 2019:  Left Ventricle: Normal cavity size, wall thickness and systolic function (ejection fraction normal). Estimated left ventricular ejection fraction is 66 - 70%. Visually measured ejection fraction. Mild (grade 1) left ventricular diastolic dysfunction. Aortic Valve: Mild aortic valve focal thickening present. Pulmonary Artery: There is no evidence of pulmonary hypertension. NST- 2019; no ischemia or infarct    Poor quality EKG in ED ST with T wave inversions. SR on tele. Cardiac risk factors: family history, dyslipidemia, sedentary life style, male gender, hypertension.       Patient Active Problem List    Diagnosis Date Noted    Diastolic HF (heart failure) ([facility] 09/01/2022    Chest pain 08/31/2022    Epigastric pain 10/14/2020    Foreign body in esophagus 02/08/2020    SSS (sick sinus syndrome) ([facility] 07/19/2019    PAT (paroxysmal atrial tachycardia) (HCC)     Hypertension     Dyslipidemia     GI bleed 04/17/2019    OA (osteoarthritis) 04/17/2019    GERD (gastroesophageal reflux disease) 04/17/2019    Prostate CA ([facility] 04/17/2019      Orin Beth MD  Past Medical History:   Diagnosis Date    Arthritis     Dyslipidemia     GERD (gastroesophageal reflux disease)     GI bleed     History of prostate surgery     Hypertension     PAT (paroxysmal atrial tachycardia) (HCC)     Prostate enlargement       Social History     Socioeconomic History    Marital status:    Tobacco Use    Smoking status: Former    Smokeless tobacco: Never    Tobacco comments:     quit 30 years ago   Substance and Sexual Activity    Alcohol use:  Yes     Alcohol/week: 14.0 standard drinks     Types: 14 Shots of liquor per week     Comment: Per week     Drug use: No     No Known Allergies   Family History   Problem Relation Age of Onset    No Known Problems Mother       Current Facility-Administered Medications   Medication Dose Route Frequency    aspirin chewable tablet 81 mg  81 mg Oral DAILY    acetaminophen (TYLENOL) tablet 650 mg  650 mg Oral Q6H PRN    finasteride (PROSCAR) tablet 5 mg  5 mg Oral DAILY    atorvastatin (LIPITOR) tablet 10 mg  10 mg Oral DAILY    megestroL (MEGACE) tablet 20 mg  20 mg Oral DAILY    trospium (SANCTURA) tablet 20 mg  20 mg Oral DAILY    pantoprazole (PROTONIX) tablet 40 mg  40 mg Oral DAILY    sertraline (ZOLOFT) tablet 50 mg  50 mg Oral DAILY    sodium chloride (NS) flush 5-40 mL  5-40 mL IntraVENous Q8H    sodium chloride (NS) flush 5-40 mL  5-40 mL IntraVENous PRN    acetaminophen (TYLENOL) tablet 650 mg  650 mg Oral Q6H PRN    Or    acetaminophen (TYLENOL) suppository 650 mg  650 mg Rectal Q6H PRN    polyethylene glycol (MIRALAX) packet 17 g  17 g Oral DAILY PRN    ondansetron (ZOFRAN ODT) tablet 4 mg  4 mg Oral Q8H PRN    Or ondansetron (ZOFRAN) injection 4 mg  4 mg IntraVENous Q6H PRN    enoxaparin (LOVENOX) injection 50 mg  1 mg/kg SubCUTAneous Q12H        Review of Symptoms:     Constitutional: Negative for chills, fever and weight loss or excessive fatigue  HENT: Negative for nosebleeds, difficulty swallowing or tissue swelling   Eyes: Negative for blurred vision, double vision, occular pain  Respiratory: Negative for cough, shortness of breath , wheezing. Gastrointestinal: Negative for abdominal pain, nausea/vomiting, blood in stool. Cardiovascular: Negative for chest pain, palpitations, orthopnea, leg swelling and PND. Skin: Negative for rash, persistent diaphoresis, persistent pruritis  Neurological: Negative for dizziness, loss of consciousness, slurred speech, headache. Psychiatric: Negative for significant anxiety, memory disturbance, change in mood. Objective:      Visit Vitals  /77   Pulse 91   Temp 97.4 °F (36.3 °C)   Resp 15   Ht 5' 6\" (1.676 m)   Wt 50.2 kg (110 lb 10.7 oz)   SpO2 91%   BMI 17.86 kg/m²       Physical Exam     General: Well developed, in no acute distress, cooperative and alert  HEENT: No carotid bruits, no JVD, trach is midline. Neck Supple, PERRL, EOM intact. Heart:  Normal S1/S2 negative S3 or S4. Regular, no murmur, gallop or rub. Respiratory: Clear bilaterally x 4, no wheezing or rales  Abdomen:   Soft, non-tender, no masses, bowel sounds are active. Extremities:  No edema, normal cap refill, no cyanosis, atraumatic. Neuro: A&Ox3, speech clear, gait stable. Skin: Skin color is normal. No rashes or lesions.  Non diaphoretic  Vascular: 2+ pulses symmetric in all extremities    Data Review:   Recent Labs     09/01/22  0411 08/31/22  1705   WBC 9.9 11.1   HGB 13.7 17.0   HCT 43.3 55.5*    213     Recent Labs     09/01/22  0411 08/31/22  1705    142   K 3.9 4.1    102   CO2 35* 34*   GLU 91 125*   BUN 18 20   CREA 0.57* 0.90   CA 9.1 9.8   ALB  --  3.7   TBILI --  0.7   ALT  --  40       No results for input(s): TROIQ, CPK, CKMB in the last 72 hours. No intake or output data in the 24 hours ending 09/01/22 0922     Cardiographics    Telemetry: SR   ECG: ST PAC's T wave inversions   Echocardiogram: 11/2019  Left Ventricle: Normal cavity size, wall thickness and systolic function (ejection fraction normal). Estimated left ventricular ejection fraction is 66 - 70%. Visually measured ejection fraction. Mild (grade 1) left ventricular diastolic dysfunction. Aortic Valve: Mild aortic valve focal thickening present. Pulmonary Artery: There is no evidence of pulmonary hypertension. New pending     CXRAY: \"minimal bibasilar atelectasis        Assessment:       Active Problems:    Hypertension ()      Dyslipidemia ()      Chest pain (0/50/7894)      Diastolic HF (heart failure) ([address] (9/1/2022)       Plan:   Acute on Chronic DHF:  SOB: On RA currently Sat's 91%  CP:  Prior ECHO EF WNL, grade I diastolic dysfunction  New ECHO pending  NST 2019, negative for ischemia or infarct  Patient with no anginal or anginal equivalent symptom at time of assessment  NT pro BNP 22k  Euvolemic on exam  Strict I/O, daily weights  CXray showed bibasilar atelectasis   Troponin 69  Was given 1 time dose of 40 mg IV lasix in ED-seems symptoms improved   Cr 0.57  No Hx CAD-on statin and ASA  VSS  Will await ECHO results   Can resume diet for now, no cardiology intervention planned  Supplement O2 as needed     SSS s/p PPM:  Patient was seen by EP last year for short asymptomatic AF (less than 1% burden)  At time, did not want intervention   Hx GIB, not OC candidate  Will have medtronic device interrogated while here. Possible PE:  Questionable small segmental pulmonary embolus in the left lower lobe. On lovenox.        Conrado Adrian, RAND  DNP,APRN,AG-ACNP-BC   CC:Orin Beth MD

## 2022-09-01 NOTE — H&P
This note is compiled to communicate with the medical care team. It may contain sensitive and protected information. It is not intended to serve as a source of communication with patients/families/friends; that communication occurs at the bedside or via alternative direct communications means (secure messaging, letters, video/telephone, etc.). Hospitalist Admission Note    NAME: Ulices Garland   :  1932   MRN:  [id number]     Date/Time:  2022 9:14 PM    Patient PCP: Demario Barrientos MD  ______________________________________________________________________  Given the patient's current clinical presentation, I have a high level of concern for decompensation if discharged from the emergency department. Complex decision making was performed, which includes reviewing the patient's available past medical records, laboratory results, and x-ray films. My assessment of this patient's clinical condition and my plan of care is as follows. Subjective:   CHIEF COMPLAINT: Chest pain    HISTORY OF PRESENT ILLNESS:     Dg Donnelly is a 80 y.o.  male who presents with complaints of 1-2 days shortness of breath with intermittent chest pain. He initially presented to his PCP today where ECG was obtained demonstrating T wave inversions which was discussed with cardiology who recommended patient present to the emergency department. History is limited secondary to patient's underlying dementia and majority of history is reported by son. His son does note that patient experienced right lower extremity swelling the day prior to symptoms that spontaneously improved. He reports no prior history of DVT/PE, no blood thinner use, denies history of coronary artery disease or congestive heart failure. They also deny sick contacts, recent URI symptoms, fever/chills, and/V/D/C. In the ED, patient hemodynamically stable saturating well on room air and afebrile.   ECG demonstrates sinus tachycardia with PACs and T wave inversions unchanged from prior. CXR demonstrates minimal bibasilar atelectasis. CTA chest demonstrates a questionable small segmental pulmonary embolism in the left lower lobe. Bilateral lower extremity duplex demonstrates no evidence of DVT. Labs demonstrate: High sensitive troponin 69, sodium 142, potassium 4.1, glucose 125, BUN 20, creatinine 0.9, WBC 11.1, hemoglobin 17.0, platelets 548. proBNP elevated at 22,037 (no prior in record). We were asked to admit for work up and evaluation of the above problems. Past Medical History:   Diagnosis Date    Arthritis     Dyslipidemia     GERD (gastroesophageal reflux disease)     GI bleed     History of prostate surgery     Hypertension     PAT (paroxysmal atrial tachycardia) (HCC)     Prostate enlargement         Past Surgical History:   Procedure Laterality Date    HX COLONOSCOPY  2004    HX ORTHOPAEDIC      multiple shoulder procedures    HX PROSTATECTOMY      HX UROLOGICAL      MO INS NEW/RPLCMT PRM PM W/TRANSV ELTRD ATRIAL&VENT N/A 7/19/2019    INSERT PPM DUAL performed by Neisha Stubbs MD at \[facility]\"" CARDIAC CATH LAB       Social History     Tobacco Use    Smoking status: Former    Smokeless tobacco: Never    Tobacco comments:     quit 30 years ago   Substance Use Topics    Alcohol use: Yes     Alcohol/week: 14.0 standard drinks     Types: 14 Shots of liquor per week     Comment: Per week         Family History   Problem Relation Age of Onset    No Known Problems Mother        No Known Allergies     Prior to Admission medications    Medication Sig Start Date End Date Taking? Authorizing Provider   lidocaine (XYLOCAINE) 2 % jelly Apply to the tip of your penis every 4 hours as needed for pain 9/28/21   Joy Grajeda MD   donepeziL (ARICEPT) 5 mg tablet  4/26/21   Provider, Historical   finasteride (PROSCAR) 5 mg tablet Take 5 mg by mouth daily.     Provider, Historical   cyanocobalamin (VITAMIN B12) 1,000 mcg/mL injection 1,000 mcg by IntraMUSCular route every month. 2/6/20   Other, MD Fred   tamsulosin (FLOMAX) 0.4 mg capsule Take 0.4 mg by mouth daily. 5/4/19   Provider, Historical   vit C/E/Zn/coppr/lutein/zeaxan (PRESERVISION AREDS-2 PO) Take 1 Tab by mouth daily. Provider, Historical   lovastatin (MEVACOR) 20 mg tablet Take 20 mg by mouth nightly. Provider, Historical   pantoprazole (PROTONIX) 40 mg tablet Take 40 mg by mouth daily. Provider, Historical       REVIEW OF SYSTEMS:       Total of 12 systems reviewed as follows:       POSITIVE= Red text   General: Fever, chills, sweats, weakness/malaise, weight loss/gain, loss of appetite    Eyes:   Vision change, eye pain   ENT:    Rhinorrhea, pharyngitis, Hearing loss    Respiratory:   cough, sputum production, SOB, RANGEL, wheezing, pleuritic pain    Cardiology:   chest pain, palpitations, orthopnea, edema, syncope    Gastrointestinal:  abdominal pain , N/V, diarrhea, dysphagia, constipation, bleeding    Genitourinary:  frequency, urgency, dysuria, hematuria, incontinence    Muskuloskeletal:  arthralgia, myalgia, back pain   Hematology:  easy bruising, nose or gum bleeding, lymphadenopathy    Dermatological: rash, ulceration, pruritis, color change / jaundice   Endocrine:  hot flashes or polydipsia    Neurological:  headache, dizziness, confusion, focal weakness, paresthesia, Speech difficulties, memory loss, gait difficulty   Psychological: Feelings of anxiety, depression, agitation       Objective:   VITALS:    Visit Vitals  BP (!) 137/124   Pulse 99   Resp 27   Ht 5' 6\" (1.676 m)   Wt 50.2 kg (110 lb 10.7 oz)   SpO2 93%   BMI 17.86 kg/m²       PHYSICAL EXAM:    General:   Alert, cooperative, no distress, elderly  male        HEENT:  Atraumatic, anicteric sclerae, pink conjunctivae, mucosa moist, dentition fair     Neck:  Supple, symmetrical, trachea midline, no abnormalities on palpation     Lungs:   CTAB. Symmetric expansion. Good aeration.  Normal respiratory effort. Chest wall:  No tenderness. No Accessory muscle use. Cardiovascular:   RRR, No murmur/rub/gallop. No JVD. Radial/AT/DP pulse 2+     GI/:   Soft, non-tender. Not distended. Bowel sounds normal. No HSM     Extremities Trace pitting edema RLE. No cyanosis. Capillary refill normal     Skin: No significant lesions noted. Not Jaundiced. No rashes      Neurologic: PERRL. EOMI. No facial asymmetry. No aphasia or slurred speech. Moves all extremities. Sensation to light touch grossly intact BUE/BLE. No overt focal deficits appreciated     Psych:  Alert and oriented X 4. Normal affect. Good insight     Other:   N/A         LAB DATA REVIEWED:    Recent Results (from the past 24 hour(s))   EKG, 12 LEAD, INITIAL    Collection Time: 08/31/22  5:02 PM   Result Value Ref Range    Ventricular Rate 101 BPM    Atrial Rate 101 BPM    P-R Interval 192 ms    QRS Duration 88 ms    Q-T Interval 384 ms    QTC Calculation (Bezet) 497 ms    Calculated R Axis -30 degrees    Calculated T Axis 163 degrees    Diagnosis       Sinus tachycardia with premature atrial complexes  Left axis deviation  Septal infarct (cited on or before 08-FEB-2020)  T wave abnormality, consider anterolateral ischemia  When compared with ECG of 08-FEB-2020 21:50,  Significant changes have occurred     CBC WITH AUTOMATED DIFF    Collection Time: 08/31/22  5:05 PM   Result Value Ref Range    WBC 11.1 4.1 - 11.1 K/uL    RBC 5.44 4.10 - 5.70 M/uL    HGB 17.0 12.1 - 17.0 g/dL    HCT 55.5 (H) 36.6 - 50.3 %    .0 (H) 80.0 - 99.0 FL    MCH 31.3 26.0 - 34.0 PG    MCHC 30.6 30.0 - 36.5 g/dL    RDW 13.6 11.5 - 14.5 %    PLATELET 450 321 - 052 K/uL    MPV 11.7 8.9 - 12.9 FL    NRBC 0.0 0  WBC    ABSOLUTE NRBC 0.00 0.00 - 0.01 K/uL    NEUTROPHILS 76 (H) 32 - 75 %    LYMPHOCYTES 17 12 - 49 %    MONOCYTES 6 5 - 13 %    EOSINOPHILS 0 0 - 7 %    BASOPHILS 1 0 - 1 %    IMMATURE GRANULOCYTES 0 0.0 - 0.5 %    ABS. NEUTROPHILS 8.4 (H) 1.8 - 8.0 K/UL    ABS. LYMPHOCYTES 1.9 0.8 - 3.5 K/UL    ABS. MONOCYTES 0.7 0.0 - 1.0 K/UL    ABS. EOSINOPHILS 0.0 0.0 - 0.4 K/UL    ABS. BASOPHILS 0.1 0.0 - 0.1 K/UL    ABS. IMM. GRANS. 0.0 0.00 - 0.04 K/UL    DF AUTOMATED     METABOLIC PANEL, COMPREHENSIVE    Collection Time: 08/31/22  5:05 PM   Result Value Ref Range    Sodium 142 136 - 145 mmol/L    Potassium 4.1 3.5 - 5.1 mmol/L    Chloride 102 97 - 108 mmol/L    CO2 34 (H) 21 - 32 mmol/L    Anion gap 6 5 - 15 mmol/L    Glucose 125 (H) 65 - 100 mg/dL    BUN 20 6 - 20 MG/DL    Creatinine 0.90 0.70 - 1.30 MG/DL    BUN/Creatinine ratio 22 (H) 12 - 20      GFR est AA >60 >60 ml/min/1.73m2    GFR est non-AA >60 >60 ml/min/1.73m2    Calcium 9.8 8.5 - 10.1 MG/DL    Bilirubin, total 0.7 0.2 - 1.0 MG/DL    ALT (SGPT) 40 12 - 78 U/L    AST (SGOT) 30 15 - 37 U/L    Alk. phosphatase 78 45 - 117 U/L    Protein, total 8.3 (H) 6.4 - 8.2 g/dL    Albumin 3.7 3.5 - 5.0 g/dL    Globulin 4.6 (H) 2.0 - 4.0 g/dL    A-G Ratio 0.8 (L) 1.1 - 2.2     TROPONIN-HIGH SENSITIVITY    Collection Time: 08/31/22  5:05 PM   Result Value Ref Range    Troponin-High Sensitivity 69 0 - 76 ng/L   NT-PRO BNP    Collection Time: 08/31/22  5:05 PM   Result Value Ref Range    NT pro-BNP 22,037 (H) <450 PG/ML       IMAGING:  CXR-minimal bibasilar atelectasis    Bilateral venous duplex-no evidence of DVT    CTA chest:  1. Questionable small segmental pulmonary embolus in the left lower lobe. 2. Chronic appearing interstitial lung opacities and scattered atelectasis  versus scarring. A couple tiny lung nodules are stable. 3. Cholelithiasis.     EKG: Sinus tachycardia with PACs, , T wave inversions similar to prior  ______________________________________________________________________    Assessment / Plan:  Chest pain  Possible PE on CTA  Moderate protein calorie malnutrition  History of right lower extremity swelling now resolved  Dementia  Essential hypertension  Hyperlipidemia  GERD  BPH    PLAN:    Chest pain  Possible PE on CTA  Admit to telemetry monitoring  High sensitive troponin negative  Cardiology consulted, greatly appreciate their expertise  CTA with questionable PE  -Duplex negative BLE  Son at bedside is very reluctant to initiate anticoagulation given patient has multiple falls at home and he is concerned for risk of bleeding, which is justified  Lovenox ordered, but son requesting we hold until tomorrow    Moderate protein calorie malnutrition  Dietary supplements ordered    History of right lower extremity swelling now resolved  No evidence of cellulitis  Duplex negative for DVT    Dementia  Continue PTA sertraline for anxiety secondary to dementia    Essential hypertension  Hyperlipidemia  Continue PTA atorvastatin    GERD  Formulary substitution PPI-Protonix    BPH  Continue PTA finasteride  Formulary substitution-Sanctura    Code Status: Full  Surrogate Decision Maker:   Son    DVT Prophylaxis: Lovenox-held per son's request  GI Prophylaxis: not indicated  Diet: N.p.o. until evaluated by cardiology       Care Plan discussed with:    Comments   Patient x    Family  x    RN     Care Manager                    Consultant:      _______________________________________________________________________  Prior Level of Function: Home with assistance    Expected  Disposition:   Home with Family    HH/PT/OT/RN x   SNF/LTC    BAN    ________________________________________________________________________  TOTAL TIME:  60 Minutes      Comments    x Reviewed previous records   >50% of visit spent in counseling and coordination of care x Discussion with patient and/or family and questions answered       ________________________________________________________________________  Signed: Av Sue DO  8/31/2022 9:14 PM    Please note that this documentation was completed in part with Dragon dictation software.  Occasionally unanticipated grammatical, syntax, homophones, and other interpretive errors are inadvertently transcribed by the computer software. Please excuse and disregard any errors that have escaped final proofreading. If in doubt, please do not hesitate to reach out to myself or the assigned hospitalist via [facility] paging system for clarification.

## 2022-09-02 NOTE — PROGRESS NOTES
Patient ID:  Patient: Gabriele Barkley  MRN: [id number]  Age: 80 y.o.  : 1932  Gender: male    Device interrogation:  The Medtronic pacemaker was interrogated revealing adequate battery, sensing, capture thresholds and lead impedances. RA  2.5, 0.75, 361  RV 9.3, 0.75, 399    Very little mode switch episodes were noted, representing <1% of the total time. No tachycardia episodes were noted. Impression:  Normal device function. He has very little atrial arrhythmias. Very little ventricular pacing. Recommendations and Programming changes:  No permanent programming changes were made.       Signed By: Naima Tobias MD     2022

## 2022-09-02 NOTE — PROGRESS NOTES
Spiritual Care Assessment/Progress Note  [facility]      NAME: Amy Lee      MRN: [id number]  AGE: 80 y.o.  SEX: male  Latter day Affiliation: [facility]   Language: English     9/2/2022     Total Time (in minutes): 46     Spiritual Assessment begun in [facility] through conversation with:         [x]Patient        [x] Family    [] Friend(s)        Reason for Consult: Advance medical directive consult     Spiritual beliefs: (Please include comment if needed)     [] Identifies with a jazmin tradition:         [] Supported by a jazmin community:            [] Claims no spiritual orientation:           [] Seeking spiritual identity:                [] Adheres to an individual form of spirituality:           [] Not able to assess:                           Identified resources for coping:      [] Prayer                               [] Music                  [] Guided Imagery     [x] Family/friends                 [] Pet visits     [] Devotional reading                         [] Unknown     [] Other:                                                Interventions offered during this visit: (See comments for more details)    Patient Interventions: Advance medical directive consult, Affirmation of jazmin     Family/Friend(s): Advance medical directive consult, Coping skills reviewed/reinforced     Plan of Care:     [] Support spiritual and/or cultural needs    [] Support AMD and/or advance care planning process      [] Support grieving process   [] Coordinate Rites and/or Rituals    [] Coordination with community clergy   [] No spiritual needs identified at this time   [] Detailed Plan of Care below (See Comments)  [] Make referral to Music Therapy  [] Make referral to Pet Therapy     [] Make referral to Addiction services  [] Make referral to [facility]  [] Make referral to Spiritual Care Partner  [] No future visits requested        [x] Contact Spiritual Care for further referrals Comments:   requested for an AMD consult, indicating family wanted to update AMD on file. Patient's son, Cheo Hylton was present. He offered  an AMD document, completed in 2011 as the current document they wanted on file.  made a copy for patiheri's file and returned original document to son. Patient received visit warmly, indicated he was doing well today. He was a little hard to understand. Son shared that jazmin his very important for patient. He is [place] and served in his Spiritism in many roles for many years. Assurance of prayer s offered patient and son were grateful for the support.        Visited by: Gregorio mercado: [phone]

## 2022-09-02 NOTE — PROGRESS NOTES
Problem: Self Care Deficits Care Plan (Adult)  Goal: *Acute Goals and Plan of Care (Insert Text)  Description: FUNCTIONAL STATUS PRIOR TO ADMISSION: Patient requried up to maximal assistance for ADLs. Reports having brother and caregivers to assist 24/7. Hx of falls, most recently down the steps. Does not use AD at baseline. HOME SUPPORT: The patient lived with brother. Has AM and PM caregivers. Dementia at baseline. Occupational Therapy Goals  Initiated 9/2/2022  1. Patient will perform upper body dressing with moderate assistance  within 7 day(s). 2.  Patient will perform lower body dressing with moderate assistance  within 7 day(s). 3.  Patient will perform toilet transfer with minimal assistance/contact guard assist within 7 day(s). 4.  Patient will perform all aspects of toileting with moderate assistance  within 7 day(s). Outcome: Progressing Towards Goal   OCCUPATIONAL THERAPY EVALUATION  Patient: Ashley Gonzalez (56 y.o. male)  Date: 9/2/2022  Primary Diagnosis: Chest pain [R07.9]  Procedure(s) (LRB):  LEFT HEART CATH / CORONARY ANGIOGRAPHY (N/A) 1 Day Post-Op   Precautions:  Fall, Bed Alarm    ASSESSMENT  Based on the objective data described below, the patient presents with generalized weakness, confusion/memory loss, limited endurance, impaired sitting/standing balance, and deconditioning. Patient is limited historian, family at bedside reporting patient required maximal assistance for ADLs from brother / caregivers in the home, he requires assist for majority of transfers, ws able to complete toileting and seated grooming with supervision. Patient tolerated briefly sitting EOB, requiring increased assistance for bed mob, constant assist to maintain seated balance. Patient able to briefly stand and side step however further mobility contraindicated due to hypertension. Patient symptomatic endorsing headache and lethargy, returned to supine in bed with assist BP returning to WNL.  Discussed DC recs/POC with family, family report desire to have palliative consult. Rn notified of session. Recommend SNF at DC.       09/02/22 1030 09/02/22 1035 09/02/22 1040   Vital Signs   Pulse (Heart Rate) 90 95 95   BP (!) 149/127 (!) 175/130 113/65   MAP (Calculated) 134 145 81   BP Patient Position Sitting Standing Supine   O2 Sat (%) 90 % 92 % 93 %   O2 Device None (Room air)  --  None (Room air)       Current Level of Function Impacting Discharge (ADLs/self-care):   Feeding: Minimum assistance    Oral Facial Hygiene/Grooming: Minimum assistance    Bathing: Total assistance    Upper Body Dressing: Maximum assistance    Lower Body Dressing: Total assistance    Toileting: Total assistance       Functional Outcome Measure: The patient scored Total: 10/100 on the Barthel Index outcome measure which is indicative of being dependent in basic self-care. Other factors to consider for discharge: below baseline, dementia      Patient will benefit from skilled therapy intervention to address the above noted impairments. PLAN :  Recommendations and Planned Interventions: self care training, functional mobility training, therapeutic exercise, balance training, therapeutic activities, endurance activities, patient education, home safety training, and family training/education    Frequency/Duration: Patient will be followed by occupational therapy 3 times a week to address goals. Recommendation for discharge: (in order for the patient to meet his/her long term goals)  Therapy up to 5 days/week in SNF setting    This discharge recommendation:  Has been made in collaboration with the attending provider and/or case management    IF patient discharges home will need the following DME: TBD       SUBJECTIVE:   Patient stated Candance Ring you .     OBJECTIVE DATA SUMMARY:   HISTORY:   Past Medical History:   Diagnosis Date    Arthritis     Dyslipidemia     GERD (gastroesophageal reflux disease)     GI bleed     History of prostate surgery     Hypertension     PAT (paroxysmal atrial tachycardia) (HCC)     Prostate enlargement      Past Surgical History:   Procedure Laterality Date    HX COLONOSCOPY  2004    HX ORTHOPAEDIC      multiple shoulder procedures    HX PROSTATECTOMY      HX UROLOGICAL      AR INS NEW/RPLCMT PRM PM W/TRANSV ELTRD ATRIAL&VENT N/A 7/19/2019    INSERT PPM DUAL performed by Opal Feng MD at [facility] CARDIAC C[facility]       Expanded or extensive additional review of patient history:     Home Situation  Home Environment: Private residence  # Steps to Enter: 4  Rails to Enter: Yes  Wheelchair Ramp: No  One/Two Story Residence: Two story, live on 1st floor  Living Alone: No  Support Systems: Child(asher), Caregiver/Home Care Staff (caregivers in AM and PM, son has PD able to provide some support)  Patient Expects to be Discharged to[de-identified] Home  Current DME Used/Available at Home: Shower chair  Tub or Shower Type: Shower    Hand dominance: Right    EXAMINATION OF PERFORMANCE DEFICITS:  Cognitive/Behavioral Status:  Neurologic State: Alert  Orientation Level: Oriented to person  Cognition: Follows commands;Memory loss  Perception: Appears intact  Perseveration: No perseveration noted  Safety/Judgement: Decreased awareness of environment;Decreased awareness of need for safety;Decreased awareness of need for assistance    Skin: intact, bruising and abrasions from reported fall on R knee    Edema: no edema    Hearing: Auditory  Auditory Impairment: Hard of hearing, bilateral    Vision/Perceptual:                           Acuity: Able to read clock/calendar on wall without difficulty; Able to read employee name badge without difficulty; Within Defined Limits    Corrective Lenses: Glasses    Range of Motion:    AROM: Generally decreased, functional  PROM: Generally decreased, functional                      Strength:    Strength: Generally decreased, functional                Coordination:  Coordination: Generally decreased, functional  Fine Motor Skills-Upper: Left Intact; Right Intact    Gross Motor Skills-Upper: Left Intact; Right Intact    Tone & Sensation:  Tone: Normal  Sensation: Intact                      Balance:  Sitting: Impaired  Sitting - Static: Fair (occasional)  Sitting - Dynamic: Poor (constant support)  Standing: Impaired  Standing - Static: Poor;Constant support  Standing - Dynamic : Poor;Constant support    Functional Mobility and Transfers for ADLs:  Bed Mobility:  Rolling: Moderate assistance;Assist x2  Supine to Sit: Maximum assistance;Assist x2  Sit to Supine: Maximum assistance;Assist x2  Scooting: Maximum assistance;Assist x2    Transfers:  Sit to Stand: Moderate assistance;Assist x2  Stand to Sit: Moderate assistance;Assist x2  Bed to Chair:  (unsafe this date)    ADL Assessment:  Feeding: Minimum assistance    Oral Facial Hygiene/Grooming: Minimum assistance    Bathing: Total assistance    Upper Body Dressing: Maximum assistance    Lower Body Dressing: Total assistance    Toileting: Total assistance       ADL Intervention and task modifications:     Lower Body Dressing Assistance  Socks: Total assistance (dependent)    Toileting  Toileting Assistance: Total assistance(dependent)    Cognitive Retraining  Safety/Judgement: Decreased awareness of environment;Decreased awareness of need for safety;Decreased awareness of need for assistance      Functional Measure:    Barthel Index:  Bathin  Bladder: 0  Bowels: 5  Groomin  Dressin  Feedin  Mobility: 0  Stairs: 0  Toilet Use: 0  Transfer (Bed to Chair and Back): 0  Total: 10/100      The Barthel ADL Index: Guidelines  1. The index should be used as a record of what a patient does, not as a record of what a patient could do. 2. The main aim is to establish degree of independence from any help, physical or verbal, however minor and for whatever reason. 3. The need for supervision renders the patient not independent.   4. A patient's performance should be established using the best available evidence. Asking the patient, friends/relatives and nurses are the usual sources, but direct observation and common sense are also important. However direct testing is not needed. 5. Usually the patient's performance over the preceding 24-48 hours is important, but occasionally longer periods will be relevant. 6. Middle categories imply that the patient supplies over 50 per cent of the effort. 7. Use of aids to be independent is allowed. Score Interpretation (from [address] [facility] 83)    Independent   60-79 Minimally independent   40-59 Partially dependent   20-39 Very dependent   <20 Totally dependent     -Katrin Baeza., Barthel, D.W. (1965). Functional evaluation: the Barthel Index. [address] ([address] [place] [address] (1997). The Barthel activities of daily living index: self-reporting versus actual performance in the old (> or = 75 years). Journal of [address] [place] [address] 45(7), [facility], RISHI, Royce Wick., [facility]. (1999). Measuring the change in disability after inpatient rehabilitation; comparison of the responsiveness of the Barthel Index and Functional [place] Measure. Journal of Neurology, Neurosurgery, and Psychiatry, 66(4), 822-403. Vanesa Merlos, N.J.A, JESUS Morgan, & Maryanne Lee, MSawyerA. (2004) Assessment of post-stroke quality of life in cost-effectiveness studies: The usefulness of the Barthel Index and the EuroQoL-5D.  Quality of Life Research, 15, 261-16     Occupational Therapy Evaluation Charge Determination   History Examination Decision-Making   MEDIUM Complexity : Expanded review of history including physical, cognitive and psychosocial  history  MEDIUM Complexity : 3-5 performance deficits relating to physical, cognitive , or psychosocial skils that result in activity limitations and / or participation restrictions MEDIUM Complexity : Patient may present with comorbidities that affect occupational performnce. Miniml to moderate modification of tasks or assistance (eg, physical or verbal ) with assesment(s) is necessary to enable patient to complete evaluation       Based on the above components, the patient evaluation is determined to be of the following complexity level: MEDIUM  Pain Rating:  Pt did not endorse pain     Activity Tolerance:   Fair    After treatment patient left in no apparent distress:    Supine in bed, Call bell within reach, Bed / chair alarm activated, and Caregiver / family present    COMMUNICATION/EDUCATION:   The patients plan of care was discussed with: Physical therapist, Occupational therapist, and Registered nurse. Patient/family have participated as able in goal setting and plan of care. This patients plan of care is appropriate for delegation to [facility].     Thank you for this referral.  Fareed Flores, OT  Time Calculation: 27 mins

## 2022-09-02 NOTE — PROGRESS NOTES
Hospitalist Progress Note    NAME: Lindsay Trujillo   :  1932   MRN:  [id number]            Subjective:     Chief Complaint / Reason for Physician Visit  Patient seen and evaluated at bedside, overnight events reviewed, patient currently has no new complaints. Discussed with RN events overnight. Review of Systems:  Symptom Y/N Comments  Symptom Y/N Comments   Fever/Chills N   Chest Pain N    Poor Appetite Y   Edema N    Cough N   Abdominal Pain N    Sputum N   Joint Pain N    SOB/RANGEL Y   Pruritis/Rash N    Nausea/vomit N   Tolerating PT/OT NA    Diarrhea N   Tolerating Diet Y    Constipation N   Other       Could NOT obtain due to:      Objective:     VITALS:   Last 24hrs VS reviewed since prior progress note.  Most recent are:  Patient Vitals for the past 24 hrs:   Temp Pulse Resp BP SpO2   22 1115 98.5 °F (36.9 °C) 83 21 (!) 111/47 97 %   22 0800 97.7 °F (36.5 °C) 86 25 129/75 97 %   22 0310 98.3 °F (36.8 °C) (!) 101 26 (!) 147/83 96 %   22 2300 98.3 °F (36.8 °C) 97 23 (!) 157/84 98 %   22 2220 -- 96 27 (!) 146/76 98 %   22 2100 -- 100 22 136/85 98 %   22 -- (!) 105 20 137/70 97 %   22 -- (!) 104 21 116/77 95 %   22 -- (!) 103 21 (!) 137/112 97 %   22 -- (!) 103 24 (!) 142/87 96 %   22 1900 98.5 °F (36.9 °C) (!) 105 22 125/78 96 %   22 -- -- -- 122/60 --   22 -- (!) 105 -- 124/68 96 %   22 -- -- -- 130/80 --   22 184 -- (!) 105 25 121/79 97 %   09/01/22 1730 -- (!) 108 26 113/81 (!) 89 %   22 1715 -- (!) 113 22 124/80 91 %   22 1700 -- (!) 112 18 (!) 126/108 --   22 1646 -- (!) 115 17 109/81 --   22 1631 -- (!) 114 24 103/79 --   22 1623 -- (!) 114 21 111/73 --   22 1542 97.2 °F (36.2 °C) (!) 119 25 124/71 --       Intake/Output Summary (Last 24 hours) at 2022 1205  Last data filed at 2022 1138  Gross per 24 hour   Intake 120 ml   Output 550 ml   Net -430 ml        PHYSICAL EXAM:  General: Patient appears comfortable    EENT:  EOMI. Anicteric sclerae. MMM  Resp:  Decreased air entry bilaterally in bilateral lower lung zones  CV:  Regular  rhythm, s1/s2 no m/r/g No edema  GI:  Soft, Non distended, Non tender. +Bowel sounds  Neurologic:  Alert and oriented X 3, normal speech,   Psych:   Good insight. Not anxious nor agitated  Skin:  No rashes. No jaundice    Procedures: see electronic medical records for all procedures/Xrays and details which were not copied into this note but were reviewed prior to creation of Plan. LABS:  I reviewed today's most current labs and imaging studies. Pertinent labs include:  Recent Labs     09/01/22  0411 08/31/22  1705   WBC 9.9 11.1   HGB 13.7 17.0   HCT 43.3 55.5*    213     Recent Labs     09/02/22  0301 09/01/22  0411 08/31/22  1705    141 142   K 3.5 3.9 4.1   CL 98 103 102   CO2 36* 35* 34*   * 91 125*   BUN 17 18 20   CREA 0.87 0.57* 0.90   CA 9.4 9.1 9.8   MG 2.5*  --   --    ALB  --   --  3.7   TBILI  --   --  0.7   ALT  --   --  40       Signed:  Dmitri Brenner MD        Reviewed most current lab test results and cultures  YES  Reviewed most current radiology test results   YES  Review and summation of old records today    NO  Reviewed patient's current orders and MAR    YES  PMH/SH reviewed - no change compared to H&P      Assessment / Plan:  Acute respiratory failure with hypoxia/new onset heart failure/decompensated heart failure with systolic dysfunction-patient found to have EF of 25 to 30% on echocardiogram, currently diuresing well, still requiring 1 L of nasal cannula for ventilatory support  Continue daily weights  Frequent intake and output monitoring  Lasix 40 mg IV daily  Attempt to wean oxygen  Transition to oral Lasix tomorrow  Transthoracic echo reviewed  Cardiology consult appreciated, continue to follow recommendations    Atrial fibrillation with RVR-currently patient off of amiodarone gtt. Continue amiodarone at current doses  Continuous telemetry monitoring  Continue to follow cardiology recommendations    Hypokalemia-replete potassium and recheck potassium    Hypertension-continue current antihypertensive medications    BPH-continue current medications    Hyperlipidemia-continue statin    Prophylaxis-Lovenox  FEN-cardiac diet, replete potassium and magnesium  Full code, will clarify about surrogate decision-maker  Disposition-likely discharge home tomorrow once cleared by cardiology      less than 18.5 Underweight / Body mass index is 17.71 kg/m². Code status: Full  Prophylaxis: Lovenox  Recommended Disposition:  PT, OT, RN     ________________________________________________________________________  Care Plan discussed with:    Comments   Patient x    Family      RN x    Care Manager x    Consultant  x                     x Multidiciplinary team rounds were held today with , nursing, pharmacist and clinical coordinator. Patient's plan of care was discussed; medications were reviewed and discharge planning was addressed.      ________________________________________________________________________  Total NON critical care TIME:  35   Minutes        Comments   >50% of visit spent in counseling and coordination of care x    ________________________________________________________________________  Jeniffer Grewal MD

## 2022-09-02 NOTE — PROGRESS NOTES
1915- bedside report from ABDIRAHMAN Garcia band off at 1900. Pt has no complaints, frequent vitals, assessment complete. In sinus tach. 2014- New bag of amio hung. 2100- Meds given, held metoprolol due to dose being given at 1610. Pt cleaned and frequent vitals till 0000. Site clean,dry, and intact. 2200- site assessment- clean dry and intact    2300- Pt reassessed, vitals, and site check. No complaints at this time. 0300- Pt reassessed, vitals and labs done.     0600- Meds given and pt is comfortable and no complaints    0700- Bedside report give to Tobi Smyth

## 2022-09-02 NOTE — PROGRESS NOTES
Problem: Mobility Impaired (Adult and Pediatric)  Goal: *Acute Goals and Plan of Care (Insert Text)  Description: FUNCTIONAL STATUS PRIOR TO ADMISSION: Pt was CGA/min. Assist level at baseline regarding amb. Efforts (son in room reports family was utilizing a HHA method, no device use). Pt only performed essential gait to/from kitchen/bathroom and otherwise sitting in recliner chair or in bed all day. HOME SUPPORT PRIOR TO ADMISSION: The patient lived with his son (son has Parkinson's) (a different son present in room today) and required minimal assistance/contact guard assist for mobility and ADL's. Pt also had aide assistance in am and pm for several hours at a time. Physical Therapy Goals  Initiated 9/2/2022  1. Patient will move from supine to sit and sit to supine , scoot up and down, and roll side to side in bed with minimal assistance/contact guard assist within 7 day(s). 2.  Patient will transfer from bed to chair and chair to bed with minimal assistance/contact guard assist using the least restrictive device within 7 day(s). 3.  Patient will perform sit to stand with minimal assistance/contact guard assist within 7 day(s). 4.  Patient will ambulate with minimal assistance/contact guard assist for 25 feet with the least restrictive device within 7 day(s). Outcome: Not Met     PHYSICAL THERAPY EVALUATION  Patient: Kate Brownlee (31 y.o. male)  Date: 9/2/2022  Primary Diagnosis: Chest pain [R07.9]  Procedure(s) (LRB):  LEFT HEART CATH / CORONARY ANGIOGRAPHY (N/A) 1 Day Post-Op   Precautions:   Fall, Bed Alarm      ASSESSMENT  Nurse clears pt for mobility and reports vitals at rest have been stable (no further tachycardia since yesterday). Based on the objective data described below, the patient presents with malnourished, debilitated state of health with generalized whole body weakness and poor balance sitting and standing today with posterior lean noted.   Son in room reports recent significant weight loss, pt not eating, and concerns about his Dad being cared for appropriately in the home given his brother has Parkinson's and aide assistance is not 24/7 as needed. He also reports discussions regarding HEMANT placement prior to this hospitalization. Recommend palliative consult and CM involvement to assist with discharge planning. .. pt requires 24/7 care. HR and O2 sats. Stable on RA, monitored throughout. .. BP fluctuations with elevated BP upon sitting/standing and return to soft with back in bed. Continue to follow. Patient Vitals for the past 4 hrs:   Temp Pulse Resp BP SpO2   09/02/22 1115 98.5 °F (36.9 °C) 83 21 (!) 111/47 97 %   09/02/22 1040 -- 95 -- 113/65 93 %   09/02/22 1035 -- 95 -- (!) 175/130 92 %   09/02/22 1030 -- 90 -- (!) 149/127 90 %   09/02/22 1015 -- -- -- -- 93 %          Current Level of Function Impacting Discharge (mobility/balance): bed mob. Mod./max. ; transfers mod. X 2 ; gait side-steps toward HOB max. X 2 (B HHA)    Functional Outcome Measure: The patient scored Total: 10/100 on the Barthel Index outcome measure which is indicative of being totally dependent in basic self-care. Other factors to consider for discharge: son present in room today reports that current home situation is not working, and he is concerned. .. he reports his brother has Parkinson's and is unable to care for the pt, and the aide assistance is not sufficient either. .. he is interested in palliative and CM assisting with a safer discharge plan for his Dad, ? FPC/LTC ; pt has dementia     Patient will benefit from skilled therapy intervention to address the above noted impairments.        PLAN :  Recommendations and Planned Interventions: bed mobility training, transfer training, gait training, therapeutic exercises, patient and family training/education, and therapeutic activities      Frequency/Duration: Patient will be followed by physical therapy:  3 times a week to address goals. Recommendation for discharge: (in order for the patient to meet his/her long term goals)  Therapy up to 5 days/week in SNF setting, ? Transition to memory care/LTC (pt needs 24/7 care/assist)    This discharge recommendation:  Has been made in collaboration with the attending provider and/or case management    IF patient discharges home will need the following DME: to be determined (TBD)         SUBJECTIVE:   Patient stated July.   Pt confused/pleasant. OBJECTIVE DATA SUMMARY:   HISTORY:    Past Medical History:   Diagnosis Date    Arthritis     Dyslipidemia     GERD (gastroesophageal reflux disease)     GI bleed     History of prostate surgery     Hypertension     PAT (paroxysmal atrial tachycardia) (HCC)     Prostate enlargement      Past Surgical History:   Procedure Laterality Date    HX COLONOSCOPY  2004    HX ORTHOPAEDIC      multiple shoulder procedures    HX PROSTATECTOMY      HX UROLOGICAL      [place] INS NEW/RPLCMT PRM PM W/TRANSV ELTRD ATRIAL&VENT N/A 7/19/2019    INSERT PPM DUAL performed by Beatrice Evans MD at [facility]       Personal factors and/or comorbidities impacting plan of care:     Home Situation  Home Environment: Private residence  # Steps to Enter: 4  Rails to Enter: Yes  Wheelchair Ramp: No  One/Two Story Residence: Two story, live on 1st floor  Living Alone: No  Support Systems: Child(asher), Caregiver/Home Care Staff (caregivers in AM and PM, son has PD able to provide some support)  Patient Expects to be Discharged to[de-identified] Home  Current DME Used/Available at Home: Shower chair  Tub or Shower Type: Shower    EXAMINATION/PRESENTATION/DECISION MAKING:   Critical Behavior:  Neurologic State: Alert  Orientation Level: Oriented to person  Cognition: Follows commands, Memory loss  Safety/Judgement: Decreased awareness of environment, Decreased awareness of need for safety, Decreased awareness of need for assistance  Hearing:   Auditory  Auditory Impairment: Hard of hearing, bilateral  Skin:  abrasion scabbed over R knee (GLF on stairs at home per son report) ; bruising BUE's  Edema: none  Range Of Motion:  AROM: Generally decreased, functional           PROM: Generally decreased, functional           Strength:    Strength: Generally decreased, functional                    Tone & Sensation:   Tone: Normal              Sensation: Intact               Coordination:  Coordination: Generally decreased, functional  Vision:   Acuity: Able to read clock/calendar on wall without difficulty; Able to read employee name badge without difficulty; Within Defined Limits  Corrective Lenses: Glasses  Functional Mobility:  Bed Mobility:  Rolling: Moderate assistance;Assist x2  Supine to Sit: Maximum assistance;Assist x2  Sit to Supine: Maximum assistance;Assist x2  Scooting: Maximum assistance;Assist x2  Transfers:  Sit to Stand: Moderate assistance;Assist x2  Stand to Sit: Moderate assistance;Assist x2        Bed to Chair:  (unsafe this date)              Balance:   Sitting: Impaired  Sitting - Static: Fair (occasional)  Sitting - Dynamic: Poor (constant support)  Standing: Impaired  Standing - Static: Poor;Constant support  Standing - Dynamic : Poor;Constant support    Ambulation/Gait Training:  Distance (ft): 3 Feet (ft) (side-steps toward [facility])  Assistive Device: Gait belt (HHA B hands)  Ambulation - Level of Assistance: Maximum assistance;Assist x2     Gait Description (WDL): Exceptions to WDL  Gait Abnormalities: Decreased step clearance; Other (posterior lean)        Base of Support: Narrowed     Speed/Keyla: Shuffled; Slow  Step Length: Right shortened;Left shortened        Interventions: Safety awareness training; Tactile cues; Verbal cues; Visual/Demos      Functional Measure:  Barthel Index:    Bathin  Bladder: 0  Bowels: 5  Groomin  Dressin  Feedin  Mobility: 0  Stairs: 0  Toilet Use: 0  Transfer (Bed to Chair and Back): 0  Total: 10/100       The Barthel ADL Index: Guidelines  1. The index should be used as a record of what a patient does, not as a record of what a patient could do. 2. The main aim is to establish degree of independence from any help, physical or verbal, however minor and for whatever reason. 3. The need for supervision renders the patient not independent. 4. A patient's performance should be established using the best available evidence. Asking the patient, friends/relatives and nurses are the usual sources, but direct observation and common sense are also important. However direct testing is not needed. 5. Usually the patient's performance over the preceding 24-48 hours is important, but occasionally longer periods will be relevant. 6. Middle categories imply that the patient supplies over 50 per cent of the effort. 7. Use of aids to be independent is allowed. Score Interpretation (from [address] [address])    Independent   60-79 Minimally independent   40-59 Partially dependent   20-39 Very dependent   <20 Totally dependent     -Katrin Baeza., BarthelGELAW. (1965). Functional evaluation: the Barthel Index. [facility] ([address] (1997). The Barthel activities of daily living index: self-reporting versus actual performance in the old (> or = 75 years). Journal [address] [address]), [facility], .HAIRF, Mamadou Patel., Chicho Perez. (1999). Measuring the change in disability after inpatient rehabilitation; comparison of the responsiveness of the Barthel Index and Functional [place] Measure. Journal of Neurology, Neurosurgery, and Psychiatry, 66(4), 641-969. Jama Fregoso N.J.A, JESUS Morgan, & Mine Apodaca MSawyerA. (2004) Assessment of post-stroke quality of life in cost-effectiveness studies: The usefulness of the Barthel Index and the EuroQoL-5D.  Quality of Life Research, 15, 394-83        Physical Therapy Evaluation Charge Determination   History Examination Presentation Decision-Making HIGH Complexity :3+ comorbidities / personal factors will impact the outcome/ POC  MEDIUM Complexity : 3 Standardized tests and measures addressing body structure, function, activity limitation and / or participation in recreation  MEDIUM Complexity : Evolving with changing characteristics  Other outcome measures Barthel  MEDIUM      Based on the above components, the patient evaluation is determined to be of the following complexity level: MEDIUM    Pain Rating:  No c/o pain    Activity Tolerance:   Poor    After treatment patient left in no apparent distress:   Call bell within reach, Bed / chair alarm activated, Caregiver / family present, Side rails x 3, and in bed with HOB raised ; B heels floated    COMMUNICATION/EDUCATION:   The patients plan of care was discussed with: Occupational therapist and Registered nurse. Fall prevention education was provided and the patient/caregiver indicated understanding. and Patient/family have participated as able in goal setting and plan of care.     Thank you for this referral.  Luis Antonio Gil, PT, DPT   Time Calculation: 30 mins

## 2022-09-02 NOTE — PROGRESS NOTES
Progress Note      9/2/2022 8:02 AM  NAME: Torie Lowery   MRN:  [id number]   Admit Diagnosis: Chest pain [R07.9]     Primary Cardiologist: Dr. Brandi Sanabria  Physician Requesting consult: Dr. Kumar Dears:     Problem list:   Dyspnea, Abnormal EKG,  T wave version involving anterolateral lead, Elevated BNP  Acute systolic heart failure - stress cardiomyopathy - EF 30%   Atrial fibrillation with RVR   LHC on 9/1/22- showed non obstructive CAD  Questionable pulmonary embolism by CTA  Sick sinus syndrome status post dual-chamber pacemaker  Hypertension  Hyperlipidemia  Dementia     Mr Charles Ortiz presented with Dyspnea, Abnormal EKG,  T wave version involving anterolateral lead, Elevated BNP;  CTA showed questionable PE. 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 Noted to have Afib with RVR during hospitalization, Converted to NSR on amiodarone gtt. Recommendations:     Cont lasix > change to PO tomorrow   Cont Amiodarone> transition to Amiodarone 400 mg po BID on 9/2/22> will discharge patient on 200 mg po BID   Cont metoprolol   Add low dose losartan for cardiomyopathy   Change lovenox to Apixaban 2.5 mg po BID on discharge   CTA findings showed questionable pulmonary embolism, it is not definitely   Continue Lipitor  Monitor Qtc interval with amiodarone      Thank you for this consult and allowing me to take part in this patients care. Please call with questions. [x]        High complexity decision making was performed       Subjective:    Doing well   No CP or SOB  Eating breakfast     Objective:      Physical Exam:    Last 24hrs VS reviewed since prior progress note.  Most recent are:    Visit Vitals  BP (!) 147/83 (BP 1 Location: Left upper arm, BP Patient Position: At rest)   Pulse (!) 101   Temp 98.3 °F (36.8 °C)   Resp 26   Ht 5' 6\" (1.676 m)   Wt 49.8 kg (109 lb 11.2 oz)   SpO2 96%   BMI 17.71 kg/m²       Intake/Output Summary (Last 24 hours) at 9/2/2022 0802  Last data filed at 9/2/2022 4725  Gross per 24 hour   Intake --   Output 250 ml   Net -250 ml           General: Alert and oriented x1, no acute distress   Neck: Supple   Respiratory: No respiratory distress, clear lung sound   Cardiovascular: Regular rate rhythm, S1S2, no murmur   Abdomen: soft, non tender, non distended   Neuro: moves all extremities, oriented x3   Skin: warm and dry   Extremity: no edema, warm to touch      Data Review    Telemetry: normal sinus rhythm, prolonged QTc         Lab Data Personally Reviewed:    Recent Labs     09/01/22  0411 08/31/22  1705   WBC 9.9 11.1   HGB 13.7 17.0   HCT 43.3 55.5*    213     No results for input(s): INR, PTP, APTT, INREXT in the last 72 hours. Recent Labs     09/02/22  0301 09/01/22  0411 08/31/22  1705    141 142   K 3.5 3.9 4.1   CL 98 103 102   CO2 36* 35* 34*   BUN 17 18 20   CREA 0.87 0.57* 0.90   * 91 125*   CA 9.4 9.1 9.8   MG 2.5*  --   --      No results for input(s): CPK, CKNDX, TROIQ in the last 72 hours. No lab exists for component: CPKMB  Lab Results   Component Value Date/Time    Cholesterol, total 171 09/02/2022 03:01 AM    HDL Cholesterol 44 09/02/2022 03:01 AM    LDL, calculated 112.4 (H) 09/02/2022 03:01 AM    Triglyceride 73 09/02/2022 03:01 AM    CHOL/HDL Ratio 3.9 09/02/2022 03:01 AM       Recent Labs     08/31/22  1705   AP 78   TP 8.3*   ALB 3.7   GLOB 4.6*     No results for input(s): PH, PCO2, PO2 in the last 72 hours.     Medications Personally Reviewed:    Current Facility-Administered Medications   Medication Dose Route Frequency    aspirin chewable tablet 81 mg  81 mg Oral DAILY    amiodarone (CORDARONE) 375 mg/250 mL D5W infusion  0.5-1 mg/min IntraVENous TITRATE    furosemide (LASIX) injection 40 mg  40 mg IntraVENous DAILY    metoprolol tartrate (LOPRESSOR) tablet 25 mg  25 mg Oral BID    megestroL (MEGACE) tablet 40 mg  40 mg Oral DAILY    metoprolol tartrate (LOPRESSOR) tablet 25 mg  25 mg Oral ONCE    finasteride (PROSCAR) tablet 5 mg  5 mg Oral DAILY    atorvastatin (LIPITOR) tablet 10 mg  10 mg Oral DAILY    trospium (SANCTURA) tablet 20 mg  20 mg Oral DAILY    pantoprazole (PROTONIX) tablet 40 mg  40 mg Oral DAILY    sertraline (ZOLOFT) tablet 50 mg  50 mg Oral DAILY    sodium chloride (NS) flush 5-40 mL  5-40 mL IntraVENous Q8H    sodium chloride (NS) flush 5-40 mL  5-40 mL IntraVENous PRN    acetaminophen (TYLENOL) tablet 650 mg  650 mg Oral Q6H PRN    Or    acetaminophen (TYLENOL) suppository 650 mg  650 mg Rectal Q6H PRN    polyethylene glycol (MIRALAX) packet 17 g  17 g Oral DAILY PRN    ondansetron (ZOFRAN ODT) tablet 4 mg  4 mg Oral Q8H PRN    Or    ondansetron (ZOFRAN) injection 4 mg  4 mg IntraVENous Q6H PRN    enoxaparin (LOVENOX) injection 50 mg  1 mg/kg SubCUTAneous Q12H              Mary Shah MD

## 2022-09-03 NOTE — PROGRESS NOTES
CODE BLUE NOTE:    During  transfer to CCU 2534.  0034 Pulse lost, compressions resumed with JOSEFA in elevator. 0036 Pulse check negative, resumed compressions. Epi in.  0038 Pulse check negative, Vfib, charging to 200J, shock delivered. Resumed compressions. 0039 Epi in.   0040 Pulse check positive, organized rhythm with pacer spikes. .  0041 /134 (142). 0045 SVT, .  0053 Pulse lost, PEA - compressions resumed with JOSEFA.  0054 Epi in.  0055 Pulse check negative, compressions resumed. 0057 Pulse check positive, disorganized rhythm. Pulse lost, resumed CPR. Epi in.  0058 2g Mag in IV push. 0059 Pulse check negative, resumed compressions. 0100 Epi in.  0101 Pulse check positive, , /94 (101).  0104 HR 84, junctional rhythm with scattered pacer spikes. 0106 BP 75/63 (70)  0107 Epi in. HR 86, scattered pacer spikes. Bicarb in.  0108 /70 (82).  0111 /118 (124)  0113 /93 (100)  ~0122 RAND Mckeon on the phone with Eitan Lester (patient's son). Code status changed to DNR.  0125 Pulse lost.  0128 Pulse present with doppler. 0131 /51 (65). 0145 Pulse lost.  0149 Time of death called by RAND Mckeon. Magnet used to deactivate pacer.

## 2022-09-03 NOTE — PROGRESS NOTES
Spoke with patient's son, Chance Nielsen. Explained acute change in mental status on floor. Need for intubation and subsequent PEA and pulseless Vtach code events. Agreed to DNR status. Cont all other critical care.     BERNARDO Prasad  Advanced Practice Provider  [facility]  9/3/2022

## 2022-09-03 NOTE — DISCHARGE SUMMARY
[facility]                                                                                         Discharge Summary     Patient: Joleen Marquez       MRN: [id number]       YOB: 1932       Age: 80 y.o. Date of admission:  8/31/2022    Date of discharge:  9/3/2022    Primary care provider:  Manas ALBERTO MD     Admitting provider:  Antwan Smith DO    Discharging provider(s): Hanna Velasco, NP - Staff [address]     Consultations  IP CONSULT TO CARDIOLOGY  IP CONSULT TO HOSPITALIST    Procedures  9/2/22. Intubation    Discharge destination: [facility] [place]. The patient is stable for discharge. Admission diagnosis  Chest pain [R07.9]    Please refer to the admission history and physical for details on the presenting problem. Final discharge diagnoses and brief hospital course    Acute systolic heart failure     Reason for ICU Admission:   Acute change in mental status  Stroke alert  Intubation for airway protection      HPI:  Per Cards note:  81 yo M presented with Dyspnea, Abnormal EKG,  T wave version involving anterolateral lead, Elevated BNP;  CTA showed questionable PE. 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 Noted to have Afib with RVR during hospitalization, Converted to NSR on amiodarone gtt. Tonight, RRT called d/t acute change in mental status. Stroke alert called. Intubated for airway protection. Transfer to ICU. Spoke with patient's son, Clemente Dumas. Explained acute change in mental status on floor. Need for intubation and subsequent PEA and pulseless Vtach code events. Agreed to DNR status. Cont all other critical care. Bradycardia, no pulse. TOD 0149. Called and spoke with Clemente Dumas.     Physical examination at discharge  Visit Vitals  /62   Pulse (!) 149   Temp 97.4 °F (36.3 °C)   Resp 18   Ht 5' 6\" (1.676 m)   Wt 49.8 kg (109 lb 11.2 oz) SpO2 93%   BMI 17.71 kg/m²          Recent Labs     09/01/22  0411 08/31/22  1705   WBC 9.9 11.1   HGB 13.7 17.0   HCT 43.3 55.5*    213     Recent Labs     09/02/22  0301 09/01/22  0411 08/31/22  1705    141 142   K 3.5 3.9 4.1   CL 98 103 102   CO2 36* 35* 34*   BUN 17 18 20   CREA 0.87 0.57* 0.90   * 91 125*   CA 9.4 9.1 9.8   MG 2.5*  --   --      Recent Labs     08/31/22  1705   AP 78   TP 8.3*   ALB 3.7   GLOB 4.6*     No results for input(s): INR, PTP, APTT, INREXT in the last 72 hours. No results for input(s): FE, TIBC, PSAT, FERR in the last 72 hours. Recent Labs     09/02/22  2337   PH PENDING   PCO2 38   PO2 198*     No results for input(s): CPK, CKMB in the last 72 hours.     No lab exists for component: TROPONINI  No components found for: [place]      ---------------------------------    Chronic Diagnoses:    Problem List as of 9/3/2022 Date Reviewed: 7/21/2021            Codes Class Noted - Resolved    Diastolic HF (heart failure) ([facility] ICD-10-CM: I50.30  ICD-9-CM: 428.30  9/1/2022 - Present        Chest pain ICD-10-CM: R07.9  ICD-9-CM: 786.50  8/31/2022 - Present        Epigastric pain ICD-10-CM: R10.13  ICD-9-CM: 789.06  10/14/2020 - Present        Foreign body in esophagus ICD-10-CM: T18.108A  ICD-9-CM: 935.1  2/8/2020 - Present        SSS (sick sinus syndrome) ([facility] ICD-10-CM: I49.5  ICD-9-CM: 427.81  7/19/2019 - Present        PAT (paroxysmal atrial tachycardia) (HCC) ICD-10-CM: I47.1  ICD-9-CM: 427.0  Unknown - Present        Hypertension ICD-10-CM: I10  ICD-9-CM: 401.9  Unknown - Present        Dyslipidemia ICD-10-CM: E78.5  ICD-9-CM: 272.4  Unknown - Present        GI bleed ICD-10-CM: K92.2  ICD-9-CM: 578.9  4/17/2019 - Present        OA (osteoarthritis) ICD-10-CM: M19.90  ICD-9-CM: 715.90  4/17/2019 - Present        GERD (gastroesophageal reflux disease) ICD-10-CM: K21.9  ICD-9-CM: 530.81  4/17/2019 - Present        Prostate CA ([facility] ICD-10-CM: C61  ICD-9-CM: 780 4/17/2019 - Present        RESOLVED: HTN (hypertension) ICD-10-CM: I10  ICD-9-CM: 401.9  4/17/2019 - 8/3/2021           Time spent on discharge related activities today greater than 30 minutes. Signed:      Hailey Boyd NP   Staff Intensivist  [facility]    9/3/2022   1:55 AM     GELA Grewal MD   Attending        [facility]

## 2022-09-03 NOTE — PROGRESS NOTES
2318: Rapid response called  Code Stroke, Patient unresponsive. 2333: ABG Obtain by Mia Sanders, RT    2337 ABG RESULTS:  pCO2: 38  pO2: 198    Patient intubated. 2347: Transported to CT via Transport ventilator RR 15  FIO2 100%. 0018: Code Blue in CT holding waiting for Tele Doctor. 0030: Transported from [address] [place] [address] to CCU 2534 via [address] [facility] [address] 0034 CODE BLUE in route. 0040: Pulse regained in 2534. Patient placed on 840 Ventilator.

## 2022-09-03 NOTE — DEATH NOTE
Death Declaration 9/3/22        [facility] CRITICAL [facility]ARE    Pt Name  Temi Wilkins date:  8/31/2022   Date and time of death:  9/3/22 @ [address]    Room Number  2534/01    Medical Record Number  [id number] @ [facility]   Age  80 y.o. Date of Birth 12/14/1932   PCP Orin Beth MD   Attending physician Venita Brower MD      Code Status  DNR    Patient seen and examined     Mental status   Unresponsive    Pupils Dilated and Fixed    Respiration Nil    Pulse  Absent     Heart Sounds  Absent    Rhythm  Flat line   Family  Notified by Nursing staff    Chaplan Service  Notified by Nursing staff     Death certificate and discharge summary completion remain  Dr. Venita Brower MD's responsibility.      BERNARDO Santiago  Advanced Practice Provider  [facility]  9/3/2022

## 2022-09-03 NOTE — PROGRESS NOTES
0045-Arrived to pt's room, code in progress. Report given at bedside by Charline Kinney RN. See code blue notes. 0130-body cleansed and placed in bag, paperwork completed. 0240-call placed to son(Jaerk) regarding  home, VM left.      0321-body, along with belongings transported to [facility] [place].

## 2022-09-03 NOTE — PROGRESS NOTES
responded to jo-ann for death of Mr. Talib Inman in the CCU. Per nurse, family was not coming. Respectful presence offered at patient's bedside.       Visited by: Queenie Iniguez  To jo-ann mercado: Tanja Lynn  ([phone])

## 2022-09-03 NOTE — PROGRESS NOTES
Problem: Falls - Risk of  Goal: *Absence of Falls  Description: Document Jose Lewis Fall Risk and appropriate interventions in the flowsheet. Outcome: Progressing Towards Goal  Note: Fall Risk Interventions:  Mobility Interventions: Assess mobility with egress test, Bed/chair exit alarm, Communicate number of staff needed for ambulation/transfer, OT consult for ADLs, Patient to call before getting OOB, PT Consult for assist device competence, Strengthening exercises (ROM-active/passive)    Mentation Interventions: Adequate sleep, hydration, pain control, Bed/chair exit alarm, Door open when patient unattended, Evaluate medications/consider consulting pharmacy, Eyeglasses and hearing aids, Familiar objects from home, Family/sitter at bedside, Gait belt with transfers/ambulation, HELP ([address]) if available, Increase mobility, More frequent rounding, Reorient patient, Room close to nurse's station, Self-releasing belt    Medication Interventions: Assess postural VS orthostatic hypotension, Evaluate medications/consider consulting pharmacy, Bed/chair exit alarm, Patient to call before getting OOB, Teach patient to arise slowly    Elimination Interventions: Bed/chair exit alarm, Call light in reach, Elevated toilet seat, Patient to call for help with toileting needs, Stay With Me (per policy), Toilet paper/wipes in reach, Toileting schedule/hourly rounds              Problem: Patient Education: Go to Patient Education Activity  Goal: Patient/Family Education  Outcome: Progressing Towards Goal     Problem: Pressure Injury - Risk of  Goal: *Prevention of pressure injury  Description: Document Nas Scale and appropriate interventions in the flowsheet.   Outcome: Progressing Towards Goal  Note: Pressure Injury Interventions:  Sensory Interventions: Assess changes in LOC, Float heels    Moisture Interventions: Internal/External urinary devices, Check for incontinence Q2 hours and as needed, Assess need for specialty bed, Apply protective barrier, creams and emollients, Absorbent underpads, Offer toileting Q_hr, Minimize layers, Limit adult briefs    Activity Interventions: Assess need for specialty bed, PT/OT evaluation, Pressure redistribution bed/mattress(bed type), Increase time out of bed    Mobility Interventions: Assess need for specialty bed, Float heels, HOB 30 degrees or less, PT/OT evaluation, Turn and reposition approx.  every two hours(pillow and wedges)    Nutrition Interventions: Document food/fluid/supplement intake    Friction and Shear Interventions: Apply protective barrier, creams and emollients, Feet elevated on foot rest, Foam dressings/transparent film/skin sealants, HOB 30 degrees or less, Lift sheet, Lift team/patient mobility team, Minimize layers, Transfer aides:transfer board/Jessica lift/ceiling lift, Transferring/repositioning devices                Problem: Patient Education: Go to Patient Education Activity  Goal: Patient/Family Education  Outcome: Progressing Towards Goal     Problem: Patient Education: Go to Patient Education Activity  Goal: Patient/Family Education  Outcome: Progressing Towards Goal     Problem: Arrhythmia Pathway (Adult)  Goal: *Absence of arrhythmia  Outcome: Progressing Towards Goal     Problem: Patient Education: Go to Patient Education Activity  Goal: Patient/Family Education  Outcome: Progressing Towards Goal     Problem: Pain  Goal: *Control of Pain  Outcome: Progressing Towards Goal  Goal: *PALLIATIVE CARE:  Alleviation of Pain  Outcome: Progressing Towards Goal     Problem: Patient Education: Go to Patient Education Activity  Goal: Patient/Family Education  Outcome: Progressing Towards Goal

## 2022-09-03 NOTE — PROCEDURES
Intubation Note    Called to bedside secondary to  respiratory failure. Patient pre-oxygenated with 100% oxygen. No sedation required for intubation    GlideScope x 1    7.5 ETT taped and secured at 21 cm at the teeth.    + Bilateral BS, + Chest rise, + ETCO2    CXR pending.     Care turned over to covering Attending MD.

## 2022-09-03 NOTE — PROGRESS NOTES
1910 (09/02/2022) - Bedside shift change report given to RK Castle (oncoming nurse) by Chase Scherer RN (offgoing nurse). Report included the following information SBAR, Kardex, Procedure Summary, Intake/Output, MAR, Accordion, Recent Results, Med Rec Status, Cardiac Rhythm NSR, Sinus Tach, and Alarm Parameters . Pt. A&O X2, asymptomatic and stable vital signs. 2250  - reposition pt. Change leads for cardiac monitor. Pt. Denied pain and SOB. 2318 - Pt. Unresponsive, don't response to painful stimulations. Call Carolyn Villalobos - Dr. Lisandra Adrian at bedside, instructed to call CODE STROKE. 2337 - Dr. Lisandra Adrian attempted to call family without success. 2340 - Pt. Is intubated. RN and CCU nurses, RRT nurse, Respiratory staff take pt. To CT.    0005 (09/03/2022) - HEAD CT and Chest X-Ray completed. 0010 - CODE BLUE called while awaiting for TeleNeuro consult, CPR initiated in [facility] 109 - TRANSFER - OUT REPORT:    Verbal report given to [facility]/DARCY at bedside in CCU (name) on Miguel Angel Bravo  being transferred to CCU (unit) for routine progression of care       Report consisted of patients Situation, Background, Assessment and   Recommendations(SBAR). Information from the following report(s) SBAR, Kardex, Procedure Summary, Intake/Output, MAR, Accordion, Recent Results, Med Rec Status, Cardiac Rhythm  , and Alarm Parameters  was reviewed with the receiving nurse. Lines:   Peripheral IV 08/31/22 Right Antecubital (Active)   Site Assessment Clean, dry, & intact 09/02/22 0001   Phlebitis Assessment 0 09/02/22 0001   Infiltration Assessment 0 09/02/22 0001   Dressing Status Clean, dry, & intact 09/02/22 0001   Dressing Type Transparent 09/02/22 0001   Hub Color/Line Status Patent 09/02/22 0001   Action Taken Dressing reinforced 09/02/22 0001   Alcohol Cap Used Yes 09/02/22 0001        Opportunity for questions and clarification was provided.       Patient transported with:   Monitor  O2 @ (Intubated) liters  Registered Nurse

## 2022-09-03 NOTE — PROGRESS NOTES
RAPID RESPONSE TEAM    Overhead rapid response paged to room # 2159/01  at  2347    Reason for rapid response:  altered mental status    Initial assessment: upon arrival, pt with agonal breathing, unresponsive to noxious stimuli. Last known well . Patient Vitals for the past 4 hrs:   Pulse Resp   22 0046 (!) 149 18          Interventions: ABG stat. Dr Montserrat Vegas at bedside. Upgraded to code Stroke at [phone]. Per Montserrat Vegas, obtain ABG prior to CT. Decision made to intubate. Phone call to anesthesia and intensivist to notify. Pt intubated by anesthesia, taken to CT for dry scan, arrival in CT at 0050. After scan, pt in CT holding awaiting tele-neuro, pt bradycardic to PEA arrest.  CPR initiated/code blue called at [address] [facility] [place]. Epi x1  0023. 1 amp bicarb  0024. Pulse check, PEA  0026. Pulse check- vtach with pulse. Bp 199/176. Pt transported to CCU. In CCU: Epi x 1, shocked VTACH at 200 joules, CPR. ROSC achieved, care turned over to CCU nurse. Report by primary RN. Dr Montserrat Vegas attempted to contact family. Outcome: pt transported to , later .     Please call with any questions or concerns    Adelaide Petit RN  Rapid Response Team  Ext [phone]     Recent Results (from the past 8 hour(s))   GLUCOSE, POC    Collection Time: 22 11:21 PM   Result Value Ref Range    Glucose (POC) 226 (H) 65 - 117 mg/dL    Performed by Anita Tao RN    BLOOD GAS, ARTERIAL    Collection Time: 22 11:37 PM   Result Value Ref Range    pH PENDING     PCO2 38 35 - 45 mmHg    PO2 198 (H) 80 - 100 mmHg    O2 METHOD NASAL CANNULA      O2 FLOW RATE 6.00 L/min    SPONTANEOUS RATE 17      Sample source ARTERIAL      SITE LEFT RADIAL      MAGNUS'S TEST NOT APPLICABLE

## 2022-09-03 NOTE — PROGRESS NOTES
RAPID RESPONSE NOTE:    BACKGROUND/ SITUATION:  22-year-old male admitted for chest pain with concerns for possible PE. Underwent left heart catheterization and an interim from admission developed atrial fibrillation with RVR. Noted to have acute mental status change prompting rapid response. Initially patient was conversant and interactive with staff. Found to be unresponsive with guppy breathing. FINDINGS:  Unresponsive, no response to sternal rub or noxious stimuli and all 4 extremities. Did not have any spontaneous movements. Regular \"guppy breathing\" with good aeration on auscultation. Pupils at 2 mm and minimally responsive to light with positive doll's eye and decreased corneal reflex. ASSESSMENT:    Acute change in LOC with marked neurologic deficits on exam.  Medication list reviewed without obvious culprit. Blood sugar was checked and reportedly greater than 200 per nursing. Given hemodynamic stability my major concern is for a catastrophic neurologic insult such as ischemic or hemorrhagic stroke versus hypoactive delirium versus postictal state. INTERVENTION/ RESPONSE  Stroke alert called  ABG ordered, but unable to be obtained  Called intensivist out of concern for patient's ability to protect his airway. Patient intubated in room prior to travel to CT scan  Case discussed with intensivist who is agreeable to ICU admission  I attempted to call both sons at numbers listed in the chart without answer.   Discrete voicemail left directing them to call the hospital.      Signed: Wally Manzano DO  9/2/2022 11:40 PM    Interval event:  Called by ER nurses and responded to Osvaldo Lacy 53 and CT scan holding  -On arrival, patient pulseless and what appeared to be asystole on the monitor  -Directed staff to initiate CPR  -1 mg epinephrine given at 2-minute zora and patient placed on Prema Chon  -ABG had been collected but no pH or bicarb was available directed staff to administer 1 amp of bicarbonate  -Second pulse check remained in asystole, compressions resumed  -Third pulse check with palpable pulse and organized rhythm-[place] intensivist who is in route to see summarized code and reported perfusing pulse. Directed staff to prepare for transport to ICU. On intensivist arrival he assumed care of the patient and went with staff and transport to the ICU. Staff were in process of preparing amnio bolus, but given pulse rhythm priority was to transport to setting better equipped to address his needs.

## 2022-09-03 NOTE — CONSULTS
[facility]    ICU TEAM Progress Note    Name: Grisel Stanley   : 1932   MRN: [id number]   Date: 2022           ICU Assessment     CHF  Cardiac Arrhythmias  Respiratory Failure  Acute Systolic HF  Hypertension  Atrial Fibrillation           ICU Comprehensive Plan of Care:     Neurological System  Aggressive Pain Control  Spontaneous Awakening Trial: Pending  Pain Medications: None  Target RASS: 0 - Alert & Calm - Spontaneously pays attention to caregiver  Sedation Medications: None    HCT, no acute findings  Tele Neuro, rec CTA head, +/- MRI    Cardiovascular System  SBP Goal of: > 90 mmHg  MAP Goal of: > 65 mmHg  Cardiac Gtts: None - For above SBP/MAP goals  Transfusion Trigger (Hgb): <7 g/dL  Keep K > 4; Mg > 2     Respiratory System  Vent Goals: Chlorhexidine   Head of bed > 30 degrees  Optimize PEEP/Ventilation/Oxygenation  Aim for lung protective ventilation  Maintain Vt 4-8 cc/kg IBW  Pulmonary toilet: Duo-Nebs   SpO2 Goal: > 92%    Renal/GI/Endocrine System  IVFs: none  GI Prophylaxis: Pepcid (famotidine)   Bowel Regimen: MiraLax  Feeding:  Pending   Blood Sugar Goal 120-180 - Glycemic Control: Insulin    Heme  Transfusion Trigger (Hgb): <7 g/dL  DVT Prophylaxis: SCD's or Sequential Compression Device and Lovenox     Infectious Disease  Antibiotics:  none  Tubes: ETT  Lines: Peripheral IV  Drains: Olivares Catheter    PT/OT  Mobility: Poor and Up with assistance  PT/OT: PT consulted and on board, OT consulted and on board, and Speech therapy consulted and on board   Restraints: Soft wrist restraints    Goals of Care   Discussed Plan of Care (goals of care): Yes  Addressed Code Status: Full Code    Called and left multiple messages with patient's sons, Marylu Kathleen and Hiral Mally to update, discuss POC and goals of care.     Discussed Care Plan with Bedside RN    Documentation of Current Medications    Subjective:   Progress Note: 2022      Reason for ICU Admission:   Acute change in mental status  Stroke alert  Intubation for airway protection     HPI:  Per Cards note:  81 yo M presented with Dyspnea, Abnormal EKG,  T wave version involving anterolateral lead, Elevated BNP;  CTA showed questionable PE. 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 Noted to have Afib with RVR during hospitalization, Converted to NSR on amiodarone gtt. Tonight, RRT called d/t acute change in mental status. Stroke alert called. Intubated for airway protection. Transfer to ICU. S/P:   Procedure(s):  LEFT HEART CATH / CORONARY ANGIOGRAPHY    Active Problem List:     Problem List  Date Reviewed: 7/21/2021            Codes Class    Diastolic HF (heart failure) (HCC) ICD-10-CM: I50.30  ICD-9-CM: 428.30         Chest pain ICD-10-CM: R07.9  ICD-9-CM: 786.50         Epigastric pain ICD-10-CM: R10.13  ICD-9-CM: 789.06         Foreign body in esophagus ICD-10-CM: T18.108A  ICD-9-CM: 935.1         SSS (sick sinus syndrome) ([facility] ICD-10-CM: I49.5  ICD-9-CM: 427.81         PAT (paroxysmal atrial tachycardia) ([facility]) ICD-10-CM: I47.1  ICD-9-CM: 427.0         Hypertension ICD-10-CM: I10  ICD-9-CM: 401.9         Dyslipidemia ICD-10-CM: E78.5  ICD-9-CM: 272.4         GI bleed ICD-10-CM: K92.2  ICD-9-CM: 578.9         OA (osteoarthritis) ICD-10-CM: M19.90  ICD-9-CM: 715.90         GERD (gastroesophageal reflux disease) ICD-10-CM: K21.9  ICD-9-CM: 530.81         Prostate CA ([facility] ICD-10-CM: C61  ICD-9-CM: 185            Past Medical History:      has a past medical history of Arthritis, Dyslipidemia, GERD (gastroesophageal reflux disease), GI bleed, History of prostate surgery, Hypertension, PAT (paroxysmal atrial tachycardia) ([facility] and Prostate enlargement.     Past Surgical History:      has a past surgical history that includes hx orthopaedic; hx urological; hx prostatectomy; hx colonoscopy (2004); and pr ins new/rplcmt prm pm w/transv eltrd atrial&vent (N/A, 2019). Home Medications:     Prior to Admission medications    Medication Sig Start Date End Date Taking? Authorizing Provider   sertraline (Zoloft) 50 mg tablet Take 50 mg by mouth daily. Yes Provider, Historical   megestroL (MEGACE) 20 mg tablet Take 20 mg by mouth daily. Yes Provider, Historical   mirabegron ER (Myrbetriq) 50 mg ER tablet Take 50 mg by mouth daily. Yes Provider, Historical   lidocaine (XYLOCAINE) 2 % jelly Apply to the tip of your penis every 4 hours as needed for pain 21  Yes Sarah Grajeda MD   finasteride (PROSCAR) 5 mg tablet Take 5 mg by mouth daily. Yes Provider, Historical   cyanocobalamin (VITAMIN B12) 1,000 mcg/mL injection 1,000 mcg by IntraMUSCular route every month. 20  Yes Other, MD Fred   lovastatin (MEVACOR) 20 mg tablet Take 20 mg by mouth nightly. Yes Provider, Historical   pantoprazole (PROTONIX) 40 mg tablet Take 40 mg by mouth daily. Yes Provider, Historical       Allergies/Social/Family History:     No Known Allergies   Social History     Tobacco Use    Smoking status: Former    Smokeless tobacco: Never    Tobacco comments:     quit 30 years ago   Substance Use Topics    Alcohol use: Yes     Alcohol/week: 14.0 standard drinks     Types: 14 Shots of liquor per week     Comment: Per week       Family History   Problem Relation Age of Onset    No Known Problems Mother        Review of Systems:     A comprehensive review of systems was negative except for that written in the HPI.     Objective:   Vital Signs:  Visit Vitals  /62   Pulse (!) 120   Temp 97.4 °F (36.3 °C)   Resp 23   Ht 5' 6\" (1.676 m)   Wt 49.8 kg (109 lb 11.2 oz)   SpO2 (!) 55%   BMI 17.71 kg/m²    O2 Flow Rate (L/min): 1 l/min O2 Device: Nasal cannula Temp (24hrs), Av °F (36.7 °C), Min:97.4 °F (36.3 °C), Max:98.5 °F (36.9 °C)           Intake/Output:     Intake/Output Summary (Last 24 hours) at 2022 6337  Last data filed at 2022 1138  Gross per 24 hour   Intake 120 ml   Output 550 ml   Net -430 ml       Physical Exam:  General:  Not awake or alert, well noursished, well developed, appears stated age   Eyes:  Sclera anicteric. Pupils pin point non reactive to light. Mouth/Throat: Mucous membranes dry, oral pharynx clear   Neck: Supple   Lungs:   Coarse to auscultation bilaterally, good effort   CV:  Regular rate and rhythm,no murmur, click, rub or gallop   Abdomen:   Soft, non-tender. bowel sounds normal. non-distended   Extremities: No cyanosis or edema   Skin: Skin color, texture, turgor normal. no acute rash or lesions   Lymph nodes: Cervical and supraclavicular normal   Musculoskeletal: No swelling or deformity   Lines/Devices:  Intact, no erythema, drainage or tenderness   Psych: Not alert or oriented, abnormal mood affect given the setting       Ventilator Settings:  Mode Rate Tidal Volume Pressure FiO2 PEEP                    Peak airway pressure:      Minute ventilation:        LABS AND  DATA: Personally reviewed  Recent Labs     22  0411 22  1705   WBC 9.9 11.1   HGB 13.7 17.0   HCT 43.3 55.5*    213     Recent Labs     22  0301 22  0411    141   K 3.5 3.9   CL 98 103   CO2 36* 35*   BUN 17 18   CREA 0.87 0.57*   * 91   CA 9.4 9.1   MG 2.5*  --      Recent Labs     22  1705   AP 78   TP 8.3*   ALB 3.7   GLOB 4.6*     No results for input(s): INR, PTP, APTT, INREXT in the last 72 hours. No results for input(s): PHI, PCO2I, PO2I, FIO2I in the last 72 hours. No results for input(s): CPK, CKMB, TROIQ, BNPP in the last 72 hours. Chest X-Ray: personally reviewed and report checked    CXR Results  (Last 48 hours)      None            EK22  Sinus rhythm   Left anterior fascicular block   T wave abnormality, consider anterolateral ischemia     ECHO:  22    Left Ventricle: Reduced left ventricular systolic function with a visually estimated EF of 25 - 30%.  Left ventricle size is normal. Normal wall thickness. Severe hypokinesis of the following segments: mid anteroseptal and apical septal. Normal diastolic function. MEDS: Reviewed    Multidisciplinary Rounds Completed:  Pending    ABCDEF Bundle/Checklist Completed:  Yes    DISPOSITION  Stay in ICU    CRITICAL CARE CONSULTANT NOTE  I had a face to face encounter with the patient, reviewed and interpreted patient data including clinical events, labs, images, vital signs, I/O's, and examined patient. I have discussed the case and the plan and management of the patient's care with the consulting services, the bedside nurses and the respiratory therapist.      NOTE OF PERSONAL INVOLVEMENT IN CARE   This patient has a high probability of imminent, clinically significant deterioration, which requires the highest level of preparedness to intervene urgently. I participated in the decision-making and personally managed or directed the management of the following life and organ supporting interventions that required my frequent assessment to treat or prevent imminent deterioration. I personally spent 65 minutes of critical care time. This is time spent at this critically ill patient's bedside actively involved in patient care as well as the coordination of care. This does not include any procedural time which has been billed separately.     BERNARDO Thompson  Advanced Practice Provider  [facility] [place] [facility]  9/2/2022

## 2022-10-10 LAB
ARTERIAL PATENCY WRIST A: ABNORMAL
BDY SITE: ABNORMAL
BREATHS.SPONTANEOUS ON VENT: 17
GAS FLOW.O2 O2 DELIVERY SYS: 6 L/MIN
PCO2 BLDA: 38 MMHG (ref 35–45)
PO2 BLDA: 198 MMHG (ref 80–100)
SAO2% DEVICE SAO2% SENSOR NAME: ABNORMAL
SPECIMEN SITE: ABNORMAL

## (undated) DEVICE — GUIDEWIRE VASC L260CM 0.035IN J TIP L3MM PTFE FIX COR NAMIC

## (undated) DEVICE — RADIFOCUS OPTITORQUE ANGIOGRAPHIC CATHETER: Brand: OPTITORQUE

## (undated) DEVICE — SYRINGE ANGIO 10 CC BRL STD PRNT POLYCARB LT BLU MEDALLION

## (undated) DEVICE — INTRO SHTH 9FR 13X20CM -- USE ITEM# 341577

## (undated) DEVICE — SUTURE VCRL 2-0 L27IN ABSRB UD PS-2 L19MM 1/2 CIR J428H

## (undated) DEVICE — 3M™ TEGADERM™ TRANSPARENT FILM DRESSING FRAME STYLE, 1626W, 4 IN X 4-3/4 IN (10 CM X 12 CM), 50/CT 4CT/CASE: Brand: 3M™ TEGADERM™

## (undated) DEVICE — IRRIGATION KT PIST SYR 60ML -- CONVERT TO ITEM 116415

## (undated) DEVICE — REM POLYHESIVE ADULT PATIENT RETURN ELECTRODE: Brand: VALLEYLAB

## (undated) DEVICE — SUTURE COAT VCRL SZ 4-0 L18IN ABSRB UD L19MM PS-2 1/2 CIR J496G

## (undated) DEVICE — ZIP 8I SURGICAL SKIN CLOSURE DEVICE: Brand: ZIP 8I SURGICAL SKIN CLOSURE DEVICE

## (undated) DEVICE — KIT ACCS INTRO 4FR L10CM NDL 21GA L7CM GWIRE L40CM

## (undated) DEVICE — GDWIRE WHISPER HITORQ EDS CSJ -- ACUITY SOLD BY BX ONLY 4648

## (undated) DEVICE — INTRO SHTH 7FR 13X20CM -- TEARAWAY

## (undated) DEVICE — ADULT SPO2 SENSOR: Brand: NELLCOR

## (undated) DEVICE — RADIFOCUS GLIDEWIRE: Brand: GLIDEWIRE

## (undated) DEVICE — CATHETER ETER DIAG L110CM OD5FR VASC PGTL COR AD PERFORMA

## (undated) DEVICE — CABLE PACE ALGTR CLP SAF 12FT --

## (undated) DEVICE — SUTURE VCRL SZ 2-0 L36IN ABSRB UD L40MM CT 1/2 CIR J957H

## (undated) DEVICE — SPLINT WR POS F/ARTERIAL ACC -- BX/10

## (undated) DEVICE — CATHETER DIAG 6FR L110CM INTRO 6FR BLLN DIA9MM 1CC PULM ART

## (undated) DEVICE — PACK PROCEDURE SURG HRT CATH

## (undated) DEVICE — PACEMAKER PACK: Brand: MEDLINE INDUSTRIES, INC.

## (undated) DEVICE — GLIDESHEATH SLENDER ACCESS KIT: Brand: GLIDESHEATH SLENDER

## (undated) DEVICE — DRAPE SURG W25XL50IN E OPN CIR BND BG

## (undated) DEVICE — TUBING PRSS MON L6IN PVC M FEM CONN

## (undated) DEVICE — SUT SLK 0 30IN SH BLK --

## (undated) DEVICE — KENDALL RADIOLUCENT FOAM MONITORING ELECTRODE RECTANGULAR SHAPE: Brand: KENDALL

## (undated) DEVICE — 3M™ IOBAN™ 2 ANTIMICROBIAL INCISE DRAPE 6640EZ: Brand: IOBAN™ 2

## (undated) DEVICE — SYR ART 700 CLEAR MARK 7 -- ARTERION

## (undated) DEVICE — TR BAND RADIAL ARTERY COMPRESSION DEVICE: Brand: TR BAND